# Patient Record
Sex: FEMALE | Race: WHITE | Employment: OTHER | URBAN - METROPOLITAN AREA
[De-identification: names, ages, dates, MRNs, and addresses within clinical notes are randomized per-mention and may not be internally consistent; named-entity substitution may affect disease eponyms.]

---

## 2017-10-27 PROBLEM — M19.012 GLENOHUMERAL ARTHRITIS, LEFT: Status: ACTIVE | Noted: 2017-10-27

## 2017-10-27 PROBLEM — M75.42 IMPINGEMENT SYNDROME OF LEFT SHOULDER: Status: ACTIVE | Noted: 2017-10-27

## 2018-06-20 ENCOUNTER — HOSPITAL ENCOUNTER (EMERGENCY)
Age: 63
Discharge: HOME | End: 2018-06-20
Attending: EMERGENCY MEDICINE
Payer: COMMERCIAL

## 2018-06-20 ENCOUNTER — ANESTHESIA (OUTPATIENT)
Dept: EMERGENCY DEPT | Age: 63
End: 2018-06-20
Payer: COMMERCIAL

## 2018-06-20 ENCOUNTER — ANESTHESIA EVENT (OUTPATIENT)
Dept: EMERGENCY DEPT | Age: 63
End: 2018-06-20
Payer: COMMERCIAL

## 2018-06-20 ENCOUNTER — APPOINTMENT (OUTPATIENT)
Dept: CT IMAGING | Age: 63
End: 2018-06-20
Payer: COMMERCIAL

## 2018-06-20 VITALS
OXYGEN SATURATION: 97 % | RESPIRATION RATE: 20 BRPM | SYSTOLIC BLOOD PRESSURE: 115 MMHG | DIASTOLIC BLOOD PRESSURE: 59 MMHG

## 2018-06-20 VITALS
HEIGHT: 68 IN | HEART RATE: 68 BPM | DIASTOLIC BLOOD PRESSURE: 61 MMHG | TEMPERATURE: 98.3 F | RESPIRATION RATE: 15 BRPM | SYSTOLIC BLOOD PRESSURE: 112 MMHG | WEIGHT: 124 LBS | OXYGEN SATURATION: 97 % | BODY MASS INDEX: 18.79 KG/M2

## 2018-06-20 DIAGNOSIS — G97.1 SPINAL HEADACHE: Primary | ICD-10-CM

## 2018-06-20 LAB
ALBUMIN SERPL-MCNC: 4.2 G/DL (ref 3.5–5.2)
ALP BLD-CCNC: 207 U/L (ref 35–104)
ALT SERPL-CCNC: 35 U/L (ref 0–32)
ANION GAP SERPL CALCULATED.3IONS-SCNC: 12 MMOL/L (ref 7–16)
AST SERPL-CCNC: 27 U/L (ref 0–31)
BASOPHILS ABSOLUTE: 0.04 E9/L (ref 0–0.2)
BASOPHILS RELATIVE PERCENT: 1.2 % (ref 0–2)
BILIRUB SERPL-MCNC: 0.4 MG/DL (ref 0–1.2)
BUN BLDV-MCNC: 14 MG/DL (ref 8–23)
CALCIUM SERPL-MCNC: 9.8 MG/DL (ref 8.6–10.2)
CHLORIDE BLD-SCNC: 103 MMOL/L (ref 98–107)
CO2: 25 MMOL/L (ref 22–29)
CREAT SERPL-MCNC: 1.4 MG/DL (ref 0.5–1)
EOSINOPHILS ABSOLUTE: 0.22 E9/L (ref 0.05–0.5)
EOSINOPHILS RELATIVE PERCENT: 6.8 % (ref 0–6)
GFR AFRICAN AMERICAN: 46
GFR NON-AFRICAN AMERICAN: 38 ML/MIN/1.73
GLUCOSE BLD-MCNC: 99 MG/DL (ref 74–109)
HCT VFR BLD CALC: 33.6 % (ref 34–48)
HEMOGLOBIN: 11.4 G/DL (ref 11.5–15.5)
IMMATURE GRANULOCYTES #: 0.01 E9/L
IMMATURE GRANULOCYTES %: 0.3 % (ref 0–5)
INR BLD: 1.1
LYMPHOCYTES ABSOLUTE: 0.25 E9/L (ref 1.5–4)
LYMPHOCYTES RELATIVE PERCENT: 7.8 % (ref 20–42)
MCH RBC QN AUTO: 31.1 PG (ref 26–35)
MCHC RBC AUTO-ENTMCNC: 33.9 % (ref 32–34.5)
MCV RBC AUTO: 91.8 FL (ref 80–99.9)
MONOCYTES ABSOLUTE: 0.27 E9/L (ref 0.1–0.95)
MONOCYTES RELATIVE PERCENT: 8.4 % (ref 2–12)
NEUTROPHILS ABSOLUTE: 2.43 E9/L (ref 1.8–7.3)
NEUTROPHILS RELATIVE PERCENT: 75.5 % (ref 43–80)
PDW BLD-RTO: 15.3 FL (ref 11.5–15)
PLATELET # BLD: 223 E9/L (ref 130–450)
PMV BLD AUTO: 8.9 FL (ref 7–12)
POTASSIUM SERPL-SCNC: 4.7 MMOL/L (ref 3.5–5)
PROTHROMBIN TIME: 12.4 SEC (ref 9.3–12.4)
RBC # BLD: 3.66 E12/L (ref 3.5–5.5)
SODIUM BLD-SCNC: 140 MMOL/L (ref 132–146)
TOTAL PROTEIN: 6.5 G/DL (ref 6.4–8.3)
WBC # BLD: 3.2 E9/L (ref 4.5–11.5)

## 2018-06-20 PROCEDURE — 96375 TX/PRO/DX INJ NEW DRUG ADDON: CPT

## 2018-06-20 PROCEDURE — 85610 PROTHROMBIN TIME: CPT

## 2018-06-20 PROCEDURE — 2580000003 HC RX 258: Performed by: EMERGENCY MEDICINE

## 2018-06-20 PROCEDURE — 6360000002 HC RX W HCPCS: Performed by: EMERGENCY MEDICINE

## 2018-06-20 PROCEDURE — 96374 THER/PROPH/DIAG INJ IV PUSH: CPT

## 2018-06-20 PROCEDURE — 99285 EMERGENCY DEPT VISIT HI MDM: CPT

## 2018-06-20 PROCEDURE — 70450 CT HEAD/BRAIN W/O DYE: CPT

## 2018-06-20 PROCEDURE — 80053 COMPREHEN METABOLIC PANEL: CPT

## 2018-06-20 PROCEDURE — 62273 INJECT EPIDURAL PATCH: CPT | Performed by: ANESTHESIOLOGY

## 2018-06-20 PROCEDURE — 36415 COLL VENOUS BLD VENIPUNCTURE: CPT

## 2018-06-20 PROCEDURE — 85025 COMPLETE CBC W/AUTO DIFF WBC: CPT

## 2018-06-20 RX ORDER — PROCHLORPERAZINE MALEATE 10 MG
1 TABLET ORAL EVERY 8 HOURS PRN
COMMUNITY
End: 2018-12-05 | Stop reason: ALTCHOICE

## 2018-06-20 RX ORDER — CAFFEINE CITRATE 20 MG/ML
200 SOLUTION INTRAVENOUS DAILY
Status: DISCONTINUED | OUTPATIENT
Start: 2018-06-20 | End: 2018-06-20 | Stop reason: SDUPTHER

## 2018-06-20 RX ORDER — 0.9 % SODIUM CHLORIDE 0.9 %
1000 INTRAVENOUS SOLUTION INTRAVENOUS ONCE
Status: COMPLETED | OUTPATIENT
Start: 2018-06-20 | End: 2018-06-20

## 2018-06-20 RX ORDER — ACYCLOVIR 200 MG/1
400 CAPSULE ORAL 2 TIMES DAILY
Status: ON HOLD | COMMUNITY
Start: 2018-06-03 | End: 2022-01-06 | Stop reason: HOSPADM

## 2018-06-20 RX ORDER — ALLOPURINOL 100 MG/1
1 TABLET ORAL DAILY
COMMUNITY
Start: 2018-06-16 | End: 2018-12-05

## 2018-06-20 RX ORDER — DIPHENHYDRAMINE HYDROCHLORIDE 50 MG/ML
50 INJECTION INTRAMUSCULAR; INTRAVENOUS ONCE
Status: COMPLETED | OUTPATIENT
Start: 2018-06-20 | End: 2018-06-20

## 2018-06-20 RX ORDER — ONDANSETRON 2 MG/ML
4 INJECTION INTRAMUSCULAR; INTRAVENOUS ONCE
Status: COMPLETED | OUTPATIENT
Start: 2018-06-20 | End: 2018-06-20

## 2018-06-20 RX ORDER — DESONIDE 0.5 MG/G
CREAM TOPICAL 2 TIMES DAILY PRN
COMMUNITY
Start: 2018-03-21 | End: 2018-12-05 | Stop reason: ALTCHOICE

## 2018-06-20 RX ORDER — ONDANSETRON 4 MG/1
4 TABLET, ORALLY DISINTEGRATING ORAL EVERY 8 HOURS PRN
Qty: 10 TABLET | Refills: 0 | Status: SHIPPED | OUTPATIENT
Start: 2018-06-20 | End: 2018-12-05

## 2018-06-20 RX ORDER — TACROLIMUS 1 MG/1
0.5 CAPSULE ORAL 2 TIMES DAILY
COMMUNITY
Start: 2017-12-26 | End: 2020-05-19

## 2018-06-20 RX ORDER — MAGNESIUM CHLORIDE 71.5 G/G
1 TABLET ORAL 2 TIMES DAILY
COMMUNITY
Start: 2018-06-15 | End: 2018-12-05

## 2018-06-20 RX ORDER — GABAPENTIN 100 MG/1
300 CAPSULE ORAL SEE ADMIN INSTRUCTIONS
COMMUNITY
Start: 2018-06-07

## 2018-06-20 RX ORDER — SULFAMETHOXAZOLE AND TRIMETHOPRIM 400; 80 MG/1; MG/1
1 TABLET ORAL
COMMUNITY
Start: 2018-06-03 | End: 2018-12-05 | Stop reason: ALTCHOICE

## 2018-06-20 RX ADMIN — SODIUM CHLORIDE 1000 ML: 9 INJECTION, SOLUTION INTRAVENOUS at 09:07

## 2018-06-20 RX ADMIN — ONDANSETRON 4 MG: 2 INJECTION INTRAMUSCULAR; INTRAVENOUS at 09:06

## 2018-06-20 RX ADMIN — SODIUM CHLORIDE 1000 ML: 9 INJECTION, SOLUTION INTRAVENOUS at 10:35

## 2018-06-20 RX ADMIN — DIPHENHYDRAMINE HYDROCHLORIDE 50 MG: 50 INJECTION, SOLUTION INTRAMUSCULAR; INTRAVENOUS at 10:25

## 2018-06-20 ASSESSMENT — ENCOUNTER SYMPTOMS
SORE THROAT: 0
SHORTNESS OF BREATH: 0
ABDOMINAL DISTENTION: 0
VOMITING: 1
WHEEZING: 0
DIARRHEA: 0
NAUSEA: 1
BACK PAIN: 0
COUGH: 0
EYE REDNESS: 0
SINUS PRESSURE: 0
EYE DISCHARGE: 0
EYE PAIN: 0

## 2018-06-20 ASSESSMENT — PAIN DESCRIPTION - LOCATION: LOCATION: HEAD

## 2018-06-20 ASSESSMENT — PAIN DESCRIPTION - DESCRIPTORS: DESCRIPTORS: ACHING

## 2018-06-20 ASSESSMENT — PAIN DESCRIPTION - PAIN TYPE: TYPE: ACUTE PAIN

## 2018-06-20 ASSESSMENT — PAIN SCALES - GENERAL: PAINLEVEL_OUTOF10: 4

## 2018-12-05 ENCOUNTER — HOSPITAL ENCOUNTER (INPATIENT)
Age: 63
LOS: 2 days | Discharge: HOME | DRG: 417 | End: 2018-12-07
Attending: EMERGENCY MEDICINE | Admitting: FAMILY MEDICINE
Payer: COMMERCIAL

## 2018-12-05 ENCOUNTER — APPOINTMENT (OUTPATIENT)
Dept: GENERAL RADIOLOGY | Age: 63
DRG: 417 | End: 2018-12-05
Payer: COMMERCIAL

## 2018-12-05 ENCOUNTER — APPOINTMENT (OUTPATIENT)
Dept: ULTRASOUND IMAGING | Age: 63
DRG: 417 | End: 2018-12-05
Payer: COMMERCIAL

## 2018-12-05 ENCOUNTER — APPOINTMENT (OUTPATIENT)
Dept: CT IMAGING | Age: 63
DRG: 417 | End: 2018-12-05
Payer: COMMERCIAL

## 2018-12-05 DIAGNOSIS — K85.90 ACUTE PANCREATITIS, UNSPECIFIED COMPLICATION STATUS, UNSPECIFIED PANCREATITIS TYPE: Primary | ICD-10-CM

## 2018-12-05 DIAGNOSIS — K85.10 ACUTE BILIARY PANCREATITIS WITHOUT INFECTION OR NECROSIS: ICD-10-CM

## 2018-12-05 LAB
ALBUMIN SERPL-MCNC: 4 G/DL (ref 3.5–5.2)
ALP BLD-CCNC: 133 U/L (ref 35–104)
ALT SERPL-CCNC: 30 U/L (ref 0–32)
ANION GAP SERPL CALCULATED.3IONS-SCNC: 10 MMOL/L (ref 7–16)
AST SERPL-CCNC: 36 U/L (ref 0–31)
BASOPHILS ABSOLUTE: 0.04 E9/L (ref 0–0.2)
BASOPHILS RELATIVE PERCENT: 0.6 % (ref 0–2)
BILIRUB SERPL-MCNC: 0.5 MG/DL (ref 0–1.2)
BUN BLDV-MCNC: 23 MG/DL (ref 8–23)
CALCIUM SERPL-MCNC: 10 MG/DL (ref 8.6–10.2)
CHLORIDE BLD-SCNC: 106 MMOL/L (ref 98–107)
CO2: 28 MMOL/L (ref 22–29)
CREAT SERPL-MCNC: 1.7 MG/DL (ref 0.5–1)
EKG ATRIAL RATE: 71 BPM
EKG P AXIS: 81 DEGREES
EKG P-R INTERVAL: 170 MS
EKG Q-T INTERVAL: 456 MS
EKG QRS DURATION: 88 MS
EKG QTC CALCULATION (BAZETT): 495 MS
EKG R AXIS: -29 DEGREES
EKG T AXIS: 26 DEGREES
EKG VENTRICULAR RATE: 71 BPM
EOSINOPHILS ABSOLUTE: 0.52 E9/L (ref 0.05–0.5)
EOSINOPHILS RELATIVE PERCENT: 8 % (ref 0–6)
GFR AFRICAN AMERICAN: 37
GFR NON-AFRICAN AMERICAN: 30 ML/MIN/1.73
GLUCOSE BLD-MCNC: 94 MG/DL (ref 74–99)
HCT VFR BLD CALC: 32.2 % (ref 34–48)
HEMOGLOBIN: 10.6 G/DL (ref 11.5–15.5)
IMMATURE GRANULOCYTES #: 0.02 E9/L
IMMATURE GRANULOCYTES %: 0.3 % (ref 0–5)
LIPASE: 2278 U/L (ref 13–60)
LYMPHOCYTES ABSOLUTE: 1.01 E9/L (ref 1.5–4)
LYMPHOCYTES RELATIVE PERCENT: 15.6 % (ref 20–42)
MCH RBC QN AUTO: 31.7 PG (ref 26–35)
MCHC RBC AUTO-ENTMCNC: 32.9 % (ref 32–34.5)
MCV RBC AUTO: 96.4 FL (ref 80–99.9)
MONOCYTES ABSOLUTE: 0.62 E9/L (ref 0.1–0.95)
MONOCYTES RELATIVE PERCENT: 9.6 % (ref 2–12)
NEUTROPHILS ABSOLUTE: 4.27 E9/L (ref 1.8–7.3)
NEUTROPHILS RELATIVE PERCENT: 65.9 % (ref 43–80)
PDW BLD-RTO: 12.4 FL (ref 11.5–15)
PLATELET # BLD: 217 E9/L (ref 130–450)
PMV BLD AUTO: 8.3 FL (ref 7–12)
POTASSIUM SERPL-SCNC: 4.4 MMOL/L (ref 3.5–5)
PRO-BNP: 2176 PG/ML (ref 0–125)
RBC # BLD: 3.34 E12/L (ref 3.5–5.5)
SODIUM BLD-SCNC: 144 MMOL/L (ref 132–146)
TOTAL PROTEIN: 6.6 G/DL (ref 6.4–8.3)
TROPONIN: <0.01 NG/ML (ref 0–0.03)
WBC # BLD: 6.5 E9/L (ref 4.5–11.5)

## 2018-12-05 PROCEDURE — 84484 ASSAY OF TROPONIN QUANT: CPT

## 2018-12-05 PROCEDURE — 76705 ECHO EXAM OF ABDOMEN: CPT

## 2018-12-05 PROCEDURE — 6360000002 HC RX W HCPCS: Performed by: FAMILY MEDICINE

## 2018-12-05 PROCEDURE — 2580000003 HC RX 258: Performed by: STUDENT IN AN ORGANIZED HEALTH CARE EDUCATION/TRAINING PROGRAM

## 2018-12-05 PROCEDURE — 80053 COMPREHEN METABOLIC PANEL: CPT

## 2018-12-05 PROCEDURE — 87040 BLOOD CULTURE FOR BACTERIA: CPT

## 2018-12-05 PROCEDURE — 85025 COMPLETE CBC W/AUTO DIFF WBC: CPT

## 2018-12-05 PROCEDURE — 93005 ELECTROCARDIOGRAM TRACING: CPT | Performed by: PHYSICIAN ASSISTANT

## 2018-12-05 PROCEDURE — 71045 X-RAY EXAM CHEST 1 VIEW: CPT

## 2018-12-05 PROCEDURE — 83690 ASSAY OF LIPASE: CPT

## 2018-12-05 PROCEDURE — 36415 COLL VENOUS BLD VENIPUNCTURE: CPT

## 2018-12-05 PROCEDURE — 80197 ASSAY OF TACROLIMUS: CPT

## 2018-12-05 PROCEDURE — 83880 ASSAY OF NATRIURETIC PEPTIDE: CPT

## 2018-12-05 PROCEDURE — 6360000002 HC RX W HCPCS: Performed by: STUDENT IN AN ORGANIZED HEALTH CARE EDUCATION/TRAINING PROGRAM

## 2018-12-05 PROCEDURE — 74176 CT ABD & PELVIS W/O CONTRAST: CPT

## 2018-12-05 PROCEDURE — 2060000000 HC ICU INTERMEDIATE R&B

## 2018-12-05 PROCEDURE — 99285 EMERGENCY DEPT VISIT HI MDM: CPT

## 2018-12-05 PROCEDURE — 2500000003 HC RX 250 WO HCPCS: Performed by: STUDENT IN AN ORGANIZED HEALTH CARE EDUCATION/TRAINING PROGRAM

## 2018-12-05 RX ORDER — 0.9 % SODIUM CHLORIDE 0.9 %
1000 INTRAVENOUS SOLUTION INTRAVENOUS ONCE
Status: COMPLETED | OUTPATIENT
Start: 2018-12-05 | End: 2018-12-05

## 2018-12-05 RX ORDER — ACETAMINOPHEN 325 MG/1
650 TABLET ORAL EVERY 4 HOURS PRN
Status: DISCONTINUED | OUTPATIENT
Start: 2018-12-05 | End: 2018-12-07 | Stop reason: HOSPADM

## 2018-12-05 RX ORDER — SODIUM CHLORIDE 0.9 % (FLUSH) 0.9 %
10 SYRINGE (ML) INJECTION EVERY 12 HOURS SCHEDULED
Status: DISCONTINUED | OUTPATIENT
Start: 2018-12-05 | End: 2018-12-07 | Stop reason: HOSPADM

## 2018-12-05 RX ORDER — HEPARIN SODIUM 10000 [USP'U]/ML
5000 INJECTION, SOLUTION INTRAVENOUS; SUBCUTANEOUS EVERY 12 HOURS
Status: DISCONTINUED | OUTPATIENT
Start: 2018-12-05 | End: 2018-12-07 | Stop reason: HOSPADM

## 2018-12-05 RX ORDER — SODIUM CHLORIDE 0.9 % (FLUSH) 0.9 %
10 SYRINGE (ML) INJECTION PRN
Status: DISCONTINUED | OUTPATIENT
Start: 2018-12-05 | End: 2018-12-07 | Stop reason: HOSPADM

## 2018-12-05 RX ADMIN — SODIUM CHLORIDE 1000 ML: 9 INJECTION, SOLUTION INTRAVENOUS at 14:00

## 2018-12-05 RX ADMIN — CEFEPIME 2 G: 2 INJECTION, POWDER, FOR SOLUTION INTRAVENOUS at 22:04

## 2018-12-05 RX ADMIN — METRONIDAZOLE 500 MG: 500 INJECTION, SOLUTION INTRAVENOUS at 23:17

## 2018-12-05 RX ADMIN — SODIUM CHLORIDE 1000 ML: 9 INJECTION, SOLUTION INTRAVENOUS at 19:03

## 2018-12-05 RX ADMIN — HEPARIN SODIUM 5000 UNITS: 10000 INJECTION INTRAVENOUS; SUBCUTANEOUS at 23:20

## 2018-12-05 ASSESSMENT — ENCOUNTER SYMPTOMS
CONSTIPATION: 0
DIARRHEA: 0
COUGH: 0
WHEEZING: 0
SHORTNESS OF BREATH: 0
NAUSEA: 0
COLOR CHANGE: 0
VOMITING: 0
ABDOMINAL PAIN: 1

## 2018-12-05 ASSESSMENT — PAIN SCALES - GENERAL
PAINLEVEL_OUTOF10: 0
PAINLEVEL_OUTOF10: 7

## 2018-12-05 ASSESSMENT — PAIN DESCRIPTION - PAIN TYPE
TYPE: ACUTE PAIN
TYPE: ACUTE PAIN

## 2018-12-05 ASSESSMENT — PAIN DESCRIPTION - ORIENTATION: ORIENTATION: MID

## 2018-12-05 ASSESSMENT — PAIN DESCRIPTION - DESCRIPTORS: DESCRIPTORS: HEAVINESS

## 2018-12-05 ASSESSMENT — PAIN DESCRIPTION - LOCATION: LOCATION: CHEST

## 2018-12-05 ASSESSMENT — PAIN DESCRIPTION - FREQUENCY: FREQUENCY: CONTINUOUS

## 2018-12-05 NOTE — ED PROVIDER NOTES
of the epigastric region. ). There is no rebound and no guarding. No hernia. Musculoskeletal: Normal range of motion. She exhibits no edema, tenderness or deformity. Lymphadenopathy:     She has no cervical adenopathy. Neurological: She is alert and oriented to person, place, and time. No cranial nerve deficit. Skin: Skin is warm and dry. Capillary refill takes less than 2 seconds. No rash noted. She is not diaphoretic. No erythema. No pallor. Psychiatric: She has a normal mood and affect. Her behavior is normal. Judgment and thought content normal.   Nursing note and vitals reviewed. Procedures  --------------------------------------------- PAST HISTORY ---------------------------------------------  Past Medical History:  has a past medical history of Acute leukemia ([facility] [address] Breast cancer ([facility] [place] [facility] Cancer ([facility] [place] [facility] History of blood transfusion; and Hypertension. Past Surgical History:  has a past surgical history that includes Tubal ligation; Breast lumpectomy; Ankle surgery (Left); other surgical history (Left, 11/29/2016); Breast lumpectomy (Right); and Dilation and curettage of uterus. Social History:  reports that she has never smoked. She has never used smokeless tobacco. She reports that she drinks alcohol. She reports that she does not use drugs. Family History: family history is not on file. The patients home medications have been reviewed.     Allergies: Penicillins and Vancomycin    -------------------------------------------------- RESULTS -------------------------------------------------    LABS:  Results for orders placed or performed during the hospital encounter of 12/05/18   CBC Auto Differential   Result Value Ref Range    WBC 6.5 4.5 - 11.5 E9/L    RBC 3.34 (L) 3.50 - 5.50 E12/L    Hemoglobin 10.6 (L) 11.5 - 15.5 g/dL    Hematocrit 32.2 (L) 34.0 - 48.0 %    MCV 96.4 80.0 - 99.9 fL    MCH 31.7 26.0 - 35.0 pg    MCHC 32.9 32.0 - 34.5 %    RDW 12.4 11.5 - 15.0 fL    Platelets 694

## 2018-12-06 ENCOUNTER — ANESTHESIA EVENT (OUTPATIENT)
Dept: OPERATING ROOM | Age: 63
DRG: 417 | End: 2018-12-06
Payer: COMMERCIAL

## 2018-12-06 ENCOUNTER — APPOINTMENT (OUTPATIENT)
Dept: GENERAL RADIOLOGY | Age: 63
DRG: 417 | End: 2018-12-06
Payer: COMMERCIAL

## 2018-12-06 ENCOUNTER — ANESTHESIA (OUTPATIENT)
Dept: OPERATING ROOM | Age: 63
DRG: 417 | End: 2018-12-06
Payer: COMMERCIAL

## 2018-12-06 VITALS
SYSTOLIC BLOOD PRESSURE: 141 MMHG | OXYGEN SATURATION: 100 % | RESPIRATION RATE: 1 BRPM | DIASTOLIC BLOOD PRESSURE: 74 MMHG | TEMPERATURE: 96.4 F

## 2018-12-06 PROCEDURE — 2500000003 HC RX 250 WO HCPCS

## 2018-12-06 PROCEDURE — 2500000003 HC RX 250 WO HCPCS: Performed by: SURGERY

## 2018-12-06 PROCEDURE — 6360000002 HC RX W HCPCS: Performed by: FAMILY MEDICINE

## 2018-12-06 PROCEDURE — 6360000002 HC RX W HCPCS

## 2018-12-06 PROCEDURE — 3209999900 FLUORO FOR SURGICAL PROCEDURES

## 2018-12-06 PROCEDURE — 88312 SPECIAL STAINS GROUP 1: CPT

## 2018-12-06 PROCEDURE — 0FT44ZZ RESECTION OF GALLBLADDER, PERCUTANEOUS ENDOSCOPIC APPROACH: ICD-10-PCS | Performed by: SURGERY

## 2018-12-06 PROCEDURE — 2580000003 HC RX 258: Performed by: STUDENT IN AN ORGANIZED HEALTH CARE EDUCATION/TRAINING PROGRAM

## 2018-12-06 PROCEDURE — 7100000000 HC PACU RECOVERY - FIRST 15 MIN: Performed by: SURGERY

## 2018-12-06 PROCEDURE — 2060000000 HC ICU INTERMEDIATE R&B

## 2018-12-06 PROCEDURE — 3600000014 HC SURGERY LEVEL 4 ADDTL 15MIN: Performed by: SURGERY

## 2018-12-06 PROCEDURE — 2580000003 HC RX 258

## 2018-12-06 PROCEDURE — 88304 TISSUE EXAM BY PATHOLOGIST: CPT

## 2018-12-06 PROCEDURE — 2580000003 HC RX 258: Performed by: FAMILY MEDICINE

## 2018-12-06 PROCEDURE — 6360000002 HC RX W HCPCS: Performed by: STUDENT IN AN ORGANIZED HEALTH CARE EDUCATION/TRAINING PROGRAM

## 2018-12-06 PROCEDURE — 6360000002 HC RX W HCPCS: Performed by: ANESTHESIOLOGY

## 2018-12-06 PROCEDURE — 2500000003 HC RX 250 WO HCPCS: Performed by: STUDENT IN AN ORGANIZED HEALTH CARE EDUCATION/TRAINING PROGRAM

## 2018-12-06 PROCEDURE — BF131ZZ FLUOROSCOPY OF GALLBLADDER AND BILE DUCTS USING LOW OSMOLAR CONTRAST: ICD-10-PCS | Performed by: SURGERY

## 2018-12-06 PROCEDURE — 3600000004 HC SURGERY LEVEL 4 BASE: Performed by: SURGERY

## 2018-12-06 PROCEDURE — 7100000001 HC PACU RECOVERY - ADDTL 15 MIN: Performed by: SURGERY

## 2018-12-06 PROCEDURE — 2709999900 HC NON-CHARGEABLE SUPPLY: Performed by: SURGERY

## 2018-12-06 PROCEDURE — 3700000000 HC ANESTHESIA ATTENDED CARE: Performed by: SURGERY

## 2018-12-06 PROCEDURE — 3700000001 HC ADD 15 MINUTES (ANESTHESIA): Performed by: SURGERY

## 2018-12-06 RX ORDER — MORPHINE SULFATE 2 MG/ML
1 INJECTION, SOLUTION INTRAMUSCULAR; INTRAVENOUS EVERY 5 MIN PRN
Status: DISCONTINUED | OUTPATIENT
Start: 2018-12-06 | End: 2018-12-06 | Stop reason: HOSPADM

## 2018-12-06 RX ORDER — DEXAMETHASONE SODIUM PHOSPHATE 10 MG/ML
INJECTION, SOLUTION INTRAMUSCULAR; INTRAVENOUS PRN
Status: DISCONTINUED | OUTPATIENT
Start: 2018-12-06 | End: 2018-12-06 | Stop reason: SDUPTHER

## 2018-12-06 RX ORDER — ROCURONIUM BROMIDE 10 MG/ML
INJECTION, SOLUTION INTRAVENOUS PRN
Status: DISCONTINUED | OUTPATIENT
Start: 2018-12-06 | End: 2018-12-06 | Stop reason: SDUPTHER

## 2018-12-06 RX ORDER — PROMETHAZINE HYDROCHLORIDE 25 MG/ML
6.25 INJECTION, SOLUTION INTRAMUSCULAR; INTRAVENOUS EVERY 10 MIN PRN
Status: DISCONTINUED | OUTPATIENT
Start: 2018-12-06 | End: 2018-12-06 | Stop reason: HOSPADM

## 2018-12-06 RX ORDER — PROPOFOL 10 MG/ML
INJECTION, EMULSION INTRAVENOUS PRN
Status: DISCONTINUED | OUTPATIENT
Start: 2018-12-06 | End: 2018-12-06 | Stop reason: SDUPTHER

## 2018-12-06 RX ORDER — SODIUM CHLORIDE, SODIUM LACTATE, POTASSIUM CHLORIDE, CALCIUM CHLORIDE 600; 310; 30; 20 MG/100ML; MG/100ML; MG/100ML; MG/100ML
INJECTION, SOLUTION INTRAVENOUS CONTINUOUS PRN
Status: DISCONTINUED | OUTPATIENT
Start: 2018-12-06 | End: 2018-12-06 | Stop reason: SDUPTHER

## 2018-12-06 RX ORDER — MEPERIDINE HYDROCHLORIDE 50 MG/ML
12.5 INJECTION INTRAMUSCULAR; INTRAVENOUS; SUBCUTANEOUS EVERY 5 MIN PRN
Status: DISCONTINUED | OUTPATIENT
Start: 2018-12-06 | End: 2018-12-06 | Stop reason: HOSPADM

## 2018-12-06 RX ORDER — HYDROCODONE BITARTRATE AND ACETAMINOPHEN 5; 325 MG/1; MG/1
2 TABLET ORAL PRN
Status: DISCONTINUED | OUTPATIENT
Start: 2018-12-06 | End: 2018-12-06 | Stop reason: HOSPADM

## 2018-12-06 RX ORDER — HYDROCODONE BITARTRATE AND ACETAMINOPHEN 5; 325 MG/1; MG/1
1 TABLET ORAL PRN
Status: DISCONTINUED | OUTPATIENT
Start: 2018-12-06 | End: 2018-12-06 | Stop reason: HOSPADM

## 2018-12-06 RX ORDER — MORPHINE SULFATE 2 MG/ML
2 INJECTION, SOLUTION INTRAMUSCULAR; INTRAVENOUS EVERY 5 MIN PRN
Status: DISCONTINUED | OUTPATIENT
Start: 2018-12-06 | End: 2018-12-06 | Stop reason: HOSPADM

## 2018-12-06 RX ORDER — LIDOCAINE HYDROCHLORIDE 20 MG/ML
INJECTION, SOLUTION INFILTRATION; PERINEURAL PRN
Status: DISCONTINUED | OUTPATIENT
Start: 2018-12-06 | End: 2018-12-06 | Stop reason: SDUPTHER

## 2018-12-06 RX ORDER — SODIUM CHLORIDE 9 MG/ML
INJECTION, SOLUTION INTRAVENOUS CONTINUOUS PRN
Status: DISCONTINUED | OUTPATIENT
Start: 2018-12-06 | End: 2018-12-06 | Stop reason: SDUPTHER

## 2018-12-06 RX ORDER — LIDOCAINE HYDROCHLORIDE AND EPINEPHRINE BITARTRATE 20; .01 MG/ML; MG/ML
INJECTION, SOLUTION SUBCUTANEOUS PRN
Status: DISCONTINUED | OUTPATIENT
Start: 2018-12-06 | End: 2018-12-06 | Stop reason: HOSPADM

## 2018-12-06 RX ORDER — SODIUM CHLORIDE 9 MG/ML
INJECTION, SOLUTION INTRAVENOUS CONTINUOUS
Status: DISCONTINUED | OUTPATIENT
Start: 2018-12-06 | End: 2018-12-07

## 2018-12-06 RX ORDER — MIDAZOLAM HYDROCHLORIDE 1 MG/ML
INJECTION INTRAMUSCULAR; INTRAVENOUS PRN
Status: DISCONTINUED | OUTPATIENT
Start: 2018-12-06 | End: 2018-12-06 | Stop reason: SDUPTHER

## 2018-12-06 RX ORDER — FENTANYL CITRATE 50 UG/ML
INJECTION, SOLUTION INTRAMUSCULAR; INTRAVENOUS PRN
Status: DISCONTINUED | OUTPATIENT
Start: 2018-12-06 | End: 2018-12-06 | Stop reason: SDUPTHER

## 2018-12-06 RX ADMIN — Medication 10 ML: at 20:24

## 2018-12-06 RX ADMIN — ROCURONIUM BROMIDE 40 MG: 10 INJECTION INTRAVENOUS at 14:01

## 2018-12-06 RX ADMIN — LIDOCAINE HYDROCHLORIDE 60 MG: 20 INJECTION, SOLUTION INFILTRATION; PERINEURAL at 14:01

## 2018-12-06 RX ADMIN — METRONIDAZOLE 500 MG: 500 INJECTION, SOLUTION INTRAVENOUS at 05:13

## 2018-12-06 RX ADMIN — CEFEPIME 2 G: 2 INJECTION, POWDER, FOR SOLUTION INTRAVENOUS at 10:52

## 2018-12-06 RX ADMIN — METRONIDAZOLE 500 MG: 500 INJECTION, SOLUTION INTRAVENOUS at 13:46

## 2018-12-06 RX ADMIN — PROPOFOL 120 MG: 10 INJECTION, EMULSION INTRAVENOUS at 14:01

## 2018-12-06 RX ADMIN — METRONIDAZOLE 500 MG: 500 INJECTION, SOLUTION INTRAVENOUS at 20:24

## 2018-12-06 RX ADMIN — METRONIDAZOLE 500 MG: 500 INJECTION, SOLUTION INTRAVENOUS at 14:15

## 2018-12-06 RX ADMIN — SODIUM CHLORIDE: 9 INJECTION, SOLUTION INTRAVENOUS at 06:33

## 2018-12-06 RX ADMIN — CEFEPIME 2 G: 2 INJECTION, POWDER, FOR SOLUTION INTRAVENOUS at 20:24

## 2018-12-06 RX ADMIN — FENTANYL CITRATE 100 MCG: 50 INJECTION, SOLUTION INTRAMUSCULAR; INTRAVENOUS at 14:01

## 2018-12-06 RX ADMIN — MIDAZOLAM HYDROCHLORIDE 2 MG: 1 INJECTION, SOLUTION INTRAMUSCULAR; INTRAVENOUS at 13:57

## 2018-12-06 RX ADMIN — HEPARIN SODIUM 5000 UNITS: 10000 INJECTION INTRAVENOUS; SUBCUTANEOUS at 20:24

## 2018-12-06 RX ADMIN — HYDROMORPHONE HYDROCHLORIDE 0.5 MG: 1 INJECTION, SOLUTION INTRAMUSCULAR; INTRAVENOUS; SUBCUTANEOUS at 15:35

## 2018-12-06 RX ADMIN — HYDROMORPHONE HYDROCHLORIDE 0.5 MG: 1 INJECTION, SOLUTION INTRAMUSCULAR; INTRAVENOUS; SUBCUTANEOUS at 15:40

## 2018-12-06 RX ADMIN — DEXAMETHASONE SODIUM PHOSPHATE 10 MG: 10 INJECTION INTRAMUSCULAR; INTRAVENOUS at 14:13

## 2018-12-06 RX ADMIN — SODIUM CHLORIDE, POTASSIUM CHLORIDE, SODIUM LACTATE AND CALCIUM CHLORIDE: 600; 310; 30; 20 INJECTION, SOLUTION INTRAVENOUS at 14:25

## 2018-12-06 RX ADMIN — SODIUM CHLORIDE: 9 INJECTION, SOLUTION INTRAVENOUS at 13:57

## 2018-12-06 ASSESSMENT — PULMONARY FUNCTION TESTS
PIF_VALUE: 19
PIF_VALUE: 1
PIF_VALUE: 16
PIF_VALUE: 1
PIF_VALUE: 13
PIF_VALUE: 16
PIF_VALUE: 19
PIF_VALUE: 15
PIF_VALUE: 22
PIF_VALUE: 21
PIF_VALUE: 2
PIF_VALUE: 22
PIF_VALUE: 18
PIF_VALUE: 21
PIF_VALUE: 1
PIF_VALUE: 11
PIF_VALUE: 19
PIF_VALUE: 18
PIF_VALUE: 18
PIF_VALUE: 2
PIF_VALUE: 18
PIF_VALUE: 19
PIF_VALUE: 10
PIF_VALUE: 23
PIF_VALUE: 15
PIF_VALUE: 1
PIF_VALUE: 14
PIF_VALUE: 21
PIF_VALUE: 18
PIF_VALUE: 18
PIF_VALUE: 20
PIF_VALUE: 13
PIF_VALUE: 10
PIF_VALUE: 16
PIF_VALUE: 16
PIF_VALUE: 19
PIF_VALUE: 17
PIF_VALUE: 18
PIF_VALUE: 22
PIF_VALUE: 16
PIF_VALUE: 20
PIF_VALUE: 22
PIF_VALUE: 18
PIF_VALUE: 18
PIF_VALUE: 22
PIF_VALUE: 16
PIF_VALUE: 18
PIF_VALUE: 21
PIF_VALUE: 18
PIF_VALUE: 21
PIF_VALUE: 16
PIF_VALUE: 21
PIF_VALUE: 17
PIF_VALUE: 15
PIF_VALUE: 1
PIF_VALUE: 19
PIF_VALUE: 19
PIF_VALUE: 12
PIF_VALUE: 18
PIF_VALUE: 13

## 2018-12-06 ASSESSMENT — PAIN SCALES - GENERAL
PAINLEVEL_OUTOF10: 0
PAINLEVEL_OUTOF10: 8
PAINLEVEL_OUTOF10: 3
PAINLEVEL_OUTOF10: 8
PAINLEVEL_OUTOF10: 8

## 2018-12-06 ASSESSMENT — PAIN DESCRIPTION - DESCRIPTORS
DESCRIPTORS: ACHING;SORE
DESCRIPTORS: ACHING;SORE;DISCOMFORT

## 2018-12-06 ASSESSMENT — PAIN DESCRIPTION - FREQUENCY: FREQUENCY: CONTINUOUS

## 2018-12-06 ASSESSMENT — PAIN DESCRIPTION - PAIN TYPE
TYPE: SURGICAL PAIN
TYPE: SURGICAL PAIN

## 2018-12-06 ASSESSMENT — PAIN DESCRIPTION - ORIENTATION
ORIENTATION: MID
ORIENTATION: MID

## 2018-12-06 ASSESSMENT — PAIN DESCRIPTION - LOCATION
LOCATION: ABDOMEN
LOCATION: ABDOMEN

## 2018-12-06 NOTE — H&P
[address]                  [address] [facility],  [facility]                              HISTORY AND PHYSICAL    PATIENT NAME: Yoly Logan                      :        1955  MED REC NO:   [id number]                            ROOM:       7402  ACCOUNT NO:   [de-identified]                           ADMIT DATE: 2018  PROVIDER:     Mark Michele DO      CHIEF COMPLAINT AND REASON FOR THIS HOSPITAL ADMISSION:  Possible acute  cholecystitis with acute pancreatitis. HISTORY OF PRESENT ILLNESS:  The patient is a 81-year-old female, who  presents to this hospital emergency room for evaluation of chest pain,  primarily epigastric which started on the morning of this hospital  admission. The pain did radiate into her right upper quadrant. She  denies any nausea, vomiting, diarrhea, constipation, melanoma or  hematochezia. She has never had an EGD and her last colonoscopy was  approximately 10 years ago. She is currently being treated at  [facility] for AML and has received a bone marrow transplant  approximately 1 year ago and is on immunosuppressive, Prograf. She was  afebrile. When she presented, her white count was 10.6. Serum  electrolytes were within normal limits. Creatinine was 1.7. Lipase was  2278. Alkaline phos was mildly elevated at 133, AST was 36, ALT was 30. She had a CT scan performed of the abdomen and pelvis which revealed the  presence of sludge in the gallbladder, possibly representing acute  cholecystitis and she also did have some inflammatory changes noted in  the area of the pancreas. The patient was admitted with cholecystitis  and pancreatitis. She was kept n.p.o.  IV fluids were instituted. Surgical consultation was requested. MEDICATIONS:  Her recent and present medications are noted.     PAST SURGICAL HISTORY:  Her past surgical history is consistent with  prior ankle surgery, breast lumpectomy, D

## 2018-12-06 NOTE — ED NOTES
Telemetry data transferred to [facility], confirmed with Suellen Ambrocio in [address]  12/05/18 1928

## 2018-12-06 NOTE — CONSULTS
GENERAL SURGERY  CONSULT NOTE  12/5/2018    Physician Consulted: Dr. Marialuisa Stewart   Reason for Consult: Possible acute cholecystitis with acute pancreatitis   Referring Physician: Dr. Nora Suarez    [facility] is a 59 y.o. female who presents for evaluation of chest pain that started this morning. Patient states she was awoken when some midsternal chest pain that radiated down into her epigastrium and RUQ. She has never had this pain before. She denies nausea, vomiting, diarrhea, constipation, melena, hematochezia. She has never had an EGD, her last colonoscopy was 10-11 years ago and normal per her. She is not on [address]. She does have a history of AML and received a bone marrow transplant 1 year ago tomorrow and she is on immunosuppressive tacrolimus. Past Medical History:   Diagnosis Date    Acute leukemia ([facility] [address]     Breast cancer ([facility] [address]     right    Cancer [facility]) 2004    right breast    History of blood transfusion     Hypertension        Past Surgical History:   Procedure Laterality Date    ANKLE SURGERY Left     ORIF    BREAST LUMPECTOMY      right side 2004. also had chemo & radiation    BREAST LUMPECTOMY Right     DILATION AND CURETTAGE OF UTERUS      OTHER SURGICAL HISTORY Left 11/29/2016    left hand middle finger ganglion removal    TUBAL LIGATION         Medications Prior to Admission:    Prior to Admission medications    Medication Sig Start Date End Date Taking? Authorizing Provider   acyclovir (ZOVIRAX) 200 MG capsule Take 1 capsule by mouth 2 times daily 6/3/18  Yes Historical Provider, MD   tacrolimus (PROGRAF) 1 MG capsule Take 0.5 mg by mouth 2 times daily  12/26/17 12/5/18 Yes Historical Provider, MD   Cholecalciferol 2000 units CAPS Take 1 capsule by mouth daily 2/1/18  Yes Historical Provider, MD   gabapentin (NEURONTIN) 100 MG capsule Take 300 mg by mouth 2 times daily.  . 6/7/18  Yes Historical Provider, MD   pantoprazole (PROTONIX) 40 MG tablet Take 40 mg by mouth Daily with supper    Yes Historical Provider, MD   therapeutic multivitamin-minerals (THERAGRAN-M) tablet Take 1 tablet by mouth daily. Yes Historical Provider, MD       Allergies   Allergen Reactions    Penicillins Shortness Of Breath     Pt said she \"passed out\"    Vancomycin Itching     Pt said she gets a \"redness and swelling\" as well       History reviewed. No pertinent family history. Social History   Substance Use Topics    Smoking status: Never Smoker    Smokeless tobacco: Never Used    Alcohol use Yes      Comment: occas         Review of Systems   General ROS: negative  Hematological and Lymphatic ROS: negative  Respiratory ROS: no cough, shortness of breath, or wheezing  Cardiovascular ROS: positive for - chest pain  negative for - irregular heartbeat, palpitations or shortness of breath  Gastrointestinal ROS: as above  Genito-Urinary ROS: no dysuria, trouble voiding, or hematuria  Musculoskeletal ROS: negative      PHYSICAL EXAM:    Vitals:    12/05/18 1904   BP: (!) 152/76   Pulse: 76   Resp: 18   Temp: 98.1 °F (36.7 °C)   SpO2: 100%       General Appearance:  awake, alert, oriented, in no acute distress  Skin:  Skin color, texture, turgor normal. No rashes or lesions. Head/face:  NCAT  Eyes:  No gross abnormalities. Lungs:  No increased work of breathing   Heart:  RR  Abdomen:  Soft, TTP RUQ w/ active guarding, non-distended  Extremities: Extremities warm to touch, pink, with no edema. LABS:  CBC  Recent Labs      12/05/18   0050   WBC  6.5   HGB  10.6*   HCT  32.2*   PLT  217     BMP  Recent Labs      12/05/18   0050   NA  144   K  4.4   CL  106   CO2  28   BUN  23   CREATININE  1.7*   CALCIUM  10.0     Liver Function  Recent Labs      12/05/18   0050  12/05/18   0950   LIPASE   --   2,278*   BILITOT  0.5   --    AST  36*   --    ALT  30   --    ALKPHOS  133*   --    PROT  6.6   --    LABALBU  4.0   --      No results for input(s): LACTATE in the last 72 hours.   No results for input(s): INR, PTT in the last 72 hours. Invalid input(s): PT    RADIOLOGY  Ct Abdomen Pelvis Wo Contrast Additional Contrast? None    Result Date: 2018  Patient MRN:  [id number] : 1955 Age: 59 years Gender: Female Order Date:  2018 1:45 PM TECHNIQUE/NUMBER OF IMAGES/COMPARISON/CLINICAL HISTORY: CT abdomen and pelvis Axial images were obtained with sagittal and coronal reconstructions . 364 images History subacute pancreatitis. FINDINGS: There is a hydrops of the gallbladder measuring 12.5 x 4.5 x 5.1 cm with significant thickening of the wall close to 9 mm there are . Tiny gallstones seen in the compressibility of the gallbladder . There is also some increased since towards the neck of the gallbladder but difficult to be defined on this study . There is some dilatation of the intrahepatic biliary tree but there is also some degree of periportal edema. The common bile duct can be traced measuring close to 8 mm as it enters the head of the pancreas . Slightly increased density seen in the remaining of the common bile duct distally . This can be manifestation of choledocholithiasis. There are signs for inflammatory reaction in the anterior left para renal retroperitoneal space at and inferior to the tail of the pancreas are. In some areas there is ill-defined peripancreatic fat planes . Cannot see specific a focal enlargement of the pancreas but the present study is done without oral and IV contrast makes it difficult to evaluate the anatomy in fine detail. Acute pancreatitis is considered . Liver has borderline size. The spleen appear unremarkable. Adrenal glands are not enlarged . Kidneys have preserved size and cortical thickness, there is no hydronephrosis. Some free fluid accumulation is seen in the cul-de-sac. In presence of acute pancreatitis this can represent discrete ascites. There is a pattern for constipation with fair amount of fecal contents throughout the entire colon .  There is no indication for bowel obstruction. Appendix is seen and has unremarkable appearance . There is no pericolonic inflammatory process . The stranding of fat planes in the left upper quadrant are related with the pancreas. Aorta and IVC appear unremarkable . The uterus appears unremarkable for the age group. The ovaries are difficult to separate from adjacent bowel segments, they are not enlarged. The bladder is not distended. Discrete thickening of the bladder wall is observed. There is a left discrete left curvature for the lumbar spine. Lower lung bases appear unremarkable. Discrete peripheral linear interstitial pattern is seen in the right middle lobe peripherally. No pleural effusions are seen. Degenerative changes are seen predominant in the L5-S1 disc space . There is also some degenerative changes in facet joints predominant on the right concave side of the curvature at the L3-4 and L4-5 level. 1. Hydrops appearance of the gallbladder with diffuse thickening of the gallbladder wall. Tiny gallstones are seen in the lumen of the gallbladder. 2. Mild dilatation of the biliary tree. Presence of periportal edema . Cannot exclude choledocholithiasis in the distal CBD . 3. Some inflammatory changes seen in the area of the pancreas extending into the anterior left pararenal retroperitoneal spaces at and below the level of the tail of the pancreas. Findings compatible with acute to mild edema to suggest pancreatitis. 4. Small free fluid collection the cul-de-sac can be relate to pancreatic ascites . 5. Further evaluation with the gallbladder ultrasound and with the MRI study/MRCP is recommended. Case was discussed with Dr. Jemima Wahl, ER physician.  ALERT:  THIS IS AN ABNORMAL REPORT     Us Gallbladder Ruq    Result Date: 2018  Patient MRN: [id number] : 1955 Age:  59 years Gender: Female Order Date: 2018 4:00 PM Exam: US GALLBLADDER RUQ Number of Images: 45 Indication:  Cholecystitis, pain, nausea and vomiting Comparison: CT

## 2018-12-07 VITALS
TEMPERATURE: 99 F | SYSTOLIC BLOOD PRESSURE: 111 MMHG | HEIGHT: 68 IN | WEIGHT: 124 LBS | RESPIRATION RATE: 18 BRPM | OXYGEN SATURATION: 95 % | BODY MASS INDEX: 18.79 KG/M2 | DIASTOLIC BLOOD PRESSURE: 55 MMHG | HEART RATE: 73 BPM

## 2018-12-07 LAB
ALBUMIN SERPL-MCNC: 3.4 G/DL (ref 3.5–5.2)
ALP BLD-CCNC: 141 U/L (ref 35–104)
ALT SERPL-CCNC: 89 U/L (ref 0–32)
ANION GAP SERPL CALCULATED.3IONS-SCNC: 13 MMOL/L (ref 7–16)
AST SERPL-CCNC: 77 U/L (ref 0–31)
BILIRUB SERPL-MCNC: 0.3 MG/DL (ref 0–1.2)
BUN BLDV-MCNC: 28 MG/DL (ref 8–23)
CALCIUM SERPL-MCNC: 9 MG/DL (ref 8.6–10.2)
CHLORIDE BLD-SCNC: 106 MMOL/L (ref 98–107)
CO2: 22 MMOL/L (ref 22–29)
CREAT SERPL-MCNC: 1.8 MG/DL (ref 0.5–1)
GFR AFRICAN AMERICAN: 34
GFR NON-AFRICAN AMERICAN: 29 ML/MIN/1.73
GLUCOSE BLD-MCNC: 90 MG/DL (ref 74–99)
LIPASE: 116 U/L (ref 13–60)
POTASSIUM SERPL-SCNC: 4.1 MMOL/L (ref 3.5–5)
SODIUM BLD-SCNC: 141 MMOL/L (ref 132–146)
TOTAL PROTEIN: 5.5 G/DL (ref 6.4–8.3)

## 2018-12-07 PROCEDURE — 36415 COLL VENOUS BLD VENIPUNCTURE: CPT

## 2018-12-07 PROCEDURE — 83690 ASSAY OF LIPASE: CPT

## 2018-12-07 PROCEDURE — 80053 COMPREHEN METABOLIC PANEL: CPT

## 2018-12-07 PROCEDURE — 2500000003 HC RX 250 WO HCPCS: Performed by: STUDENT IN AN ORGANIZED HEALTH CARE EDUCATION/TRAINING PROGRAM

## 2018-12-07 PROCEDURE — 2580000003 HC RX 258: Performed by: FAMILY MEDICINE

## 2018-12-07 PROCEDURE — 6370000000 HC RX 637 (ALT 250 FOR IP): Performed by: FAMILY MEDICINE

## 2018-12-07 RX ORDER — HYDROCODONE BITARTRATE AND ACETAMINOPHEN 5; 325 MG/1; MG/1
1 TABLET ORAL EVERY 6 HOURS PRN
Qty: 28 TABLET | Refills: 0 | Status: SHIPPED | OUTPATIENT
Start: 2018-12-07 | End: 2018-12-14

## 2018-12-07 RX ADMIN — SODIUM CHLORIDE: 9 INJECTION, SOLUTION INTRAVENOUS at 05:06

## 2018-12-07 RX ADMIN — METRONIDAZOLE 500 MG: 500 INJECTION, SOLUTION INTRAVENOUS at 04:59

## 2018-12-07 RX ADMIN — ACETAMINOPHEN 650 MG: 325 TABLET, FILM COATED ORAL at 07:03

## 2018-12-07 RX ADMIN — Medication 10 ML: at 08:26

## 2018-12-07 ASSESSMENT — PAIN DESCRIPTION - PAIN TYPE
TYPE: SURGICAL PAIN
TYPE: ACUTE PAIN;SURGICAL PAIN;CHRONIC PAIN

## 2018-12-07 ASSESSMENT — PAIN SCALES - GENERAL
PAINLEVEL_OUTOF10: 0
PAINLEVEL_OUTOF10: 0
PAINLEVEL_OUTOF10: 5

## 2018-12-07 ASSESSMENT — PAIN DESCRIPTION - FREQUENCY: FREQUENCY: CONTINUOUS

## 2018-12-07 ASSESSMENT — PAIN DESCRIPTION - DESCRIPTORS: DESCRIPTORS: ACHING;SORE;DISCOMFORT

## 2018-12-07 ASSESSMENT — PAIN DESCRIPTION - ORIENTATION: ORIENTATION: MID

## 2018-12-07 ASSESSMENT — PAIN DESCRIPTION - LOCATION: LOCATION: ABDOMEN

## 2018-12-08 LAB — TACROLIMUS BLOOD: <1 NG/ML

## 2018-12-10 LAB
BLOOD CULTURE, ROUTINE: NORMAL
CULTURE, BLOOD 2: NORMAL

## 2019-02-08 ENCOUNTER — HOSPITAL ENCOUNTER (OUTPATIENT)
Age: 64
Discharge: HOME | End: 2019-02-10

## 2019-02-08 PROCEDURE — 88305 TISSUE EXAM BY PATHOLOGIST: CPT

## 2020-05-19 PROBLEM — R09.89: Status: ACTIVE | Noted: 2020-05-19

## 2020-05-20 ENCOUNTER — APPOINTMENT (OUTPATIENT)
Dept: GENERAL RADIOLOGY | Age: 65
DRG: 291 | End: 2020-05-20
Payer: COMMERCIAL

## 2020-05-20 ENCOUNTER — APPOINTMENT (OUTPATIENT)
Dept: CT IMAGING | Age: 65
DRG: 291 | End: 2020-05-20
Payer: COMMERCIAL

## 2020-05-20 ENCOUNTER — HOSPITAL ENCOUNTER (INPATIENT)
Age: 65
LOS: 2 days | Discharge: ANOTHER HOSPITAL | DRG: 291 | End: 2020-05-22
Attending: EMERGENCY MEDICINE | Admitting: FAMILY MEDICINE
Payer: COMMERCIAL

## 2020-05-20 PROBLEM — I50.9 ACUTE CONGESTIVE HEART FAILURE (HCC): Status: ACTIVE | Noted: 2020-05-20

## 2020-05-20 LAB
ALBUMIN SERPL-MCNC: 4.2 G/DL (ref 3.5–5.2)
ALP BLD-CCNC: 92 U/L (ref 35–104)
ALT SERPL-CCNC: 59 U/L (ref 0–32)
ANION GAP SERPL CALCULATED.3IONS-SCNC: 11 MMOL/L (ref 7–16)
AST SERPL-CCNC: 58 U/L (ref 0–31)
BASOPHILS ABSOLUTE: 0.06 E9/L (ref 0–0.2)
BASOPHILS RELATIVE PERCENT: 1.2 % (ref 0–2)
BILIRUB SERPL-MCNC: 0.5 MG/DL (ref 0–1.2)
BUN BLDV-MCNC: 22 MG/DL (ref 8–23)
CALCIUM SERPL-MCNC: 9.5 MG/DL (ref 8.6–10.2)
CHLORIDE BLD-SCNC: 103 MMOL/L (ref 98–107)
CO2: 25 MMOL/L (ref 22–29)
CREAT SERPL-MCNC: 1.5 MG/DL (ref 0.5–1)
EOSINOPHILS ABSOLUTE: 0.39 E9/L (ref 0.05–0.5)
EOSINOPHILS RELATIVE PERCENT: 7.6 % (ref 0–6)
GFR AFRICAN AMERICAN: 42
GFR NON-AFRICAN AMERICAN: 35 ML/MIN/1.73
GLUCOSE BLD-MCNC: 104 MG/DL (ref 74–99)
HCT VFR BLD CALC: 40.4 % (ref 34–48)
HEMOGLOBIN: 13.4 G/DL (ref 11.5–15.5)
IMMATURE GRANULOCYTES #: 0.02 E9/L
IMMATURE GRANULOCYTES %: 0.4 % (ref 0–5)
LACTIC ACID, SEPSIS: 2.1 MMOL/L (ref 0.5–1.9)
LYMPHOCYTES ABSOLUTE: 1.69 E9/L (ref 1.5–4)
LYMPHOCYTES RELATIVE PERCENT: 32.8 % (ref 20–42)
MCH RBC QN AUTO: 31.3 PG (ref 26–35)
MCHC RBC AUTO-ENTMCNC: 33.2 % (ref 32–34.5)
MCV RBC AUTO: 94.4 FL (ref 80–99.9)
MONOCYTES ABSOLUTE: 0.48 E9/L (ref 0.1–0.95)
MONOCYTES RELATIVE PERCENT: 9.3 % (ref 2–12)
NEUTROPHILS ABSOLUTE: 2.51 E9/L (ref 1.8–7.3)
NEUTROPHILS RELATIVE PERCENT: 48.7 % (ref 43–80)
PDW BLD-RTO: 13.4 FL (ref 11.5–15)
PLATELET # BLD: 233 E9/L (ref 130–450)
PMV BLD AUTO: 8.9 FL (ref 7–12)
POTASSIUM SERPL-SCNC: 3.2 MMOL/L (ref 3.5–5)
PRO-BNP: ABNORMAL PG/ML (ref 0–125)
RBC # BLD: 4.28 E12/L (ref 3.5–5.5)
SARS-COV-2, NAAT: NOT DETECTED
SODIUM BLD-SCNC: 139 MMOL/L (ref 132–146)
TOTAL PROTEIN: 6.6 G/DL (ref 6.4–8.3)
TROPONIN: <0.01 NG/ML (ref 0–0.03)
WBC # BLD: 5.2 E9/L (ref 4.5–11.5)

## 2020-05-20 PROCEDURE — 71275 CT ANGIOGRAPHY CHEST: CPT

## 2020-05-20 PROCEDURE — 83605 ASSAY OF LACTIC ACID: CPT

## 2020-05-20 PROCEDURE — 96374 THER/PROPH/DIAG INJ IV PUSH: CPT

## 2020-05-20 PROCEDURE — 94760 N-INVAS EAR/PLS OXIMETRY 1: CPT

## 2020-05-20 PROCEDURE — 80053 COMPREHEN METABOLIC PANEL: CPT

## 2020-05-20 PROCEDURE — 2060000000 HC ICU INTERMEDIATE R&B

## 2020-05-20 PROCEDURE — 84484 ASSAY OF TROPONIN QUANT: CPT

## 2020-05-20 PROCEDURE — 6360000002 HC RX W HCPCS: Performed by: EMERGENCY MEDICINE

## 2020-05-20 PROCEDURE — 6360000004 HC RX CONTRAST MEDICATION: Performed by: RADIOLOGY

## 2020-05-20 PROCEDURE — 85025 COMPLETE CBC W/AUTO DIFF WBC: CPT

## 2020-05-20 PROCEDURE — U0002 COVID-19 LAB TEST NON-CDC: HCPCS

## 2020-05-20 PROCEDURE — 93005 ELECTROCARDIOGRAM TRACING: CPT | Performed by: EMERGENCY MEDICINE

## 2020-05-20 PROCEDURE — 83880 ASSAY OF NATRIURETIC PEPTIDE: CPT

## 2020-05-20 PROCEDURE — 6370000000 HC RX 637 (ALT 250 FOR IP): Performed by: EMERGENCY MEDICINE

## 2020-05-20 PROCEDURE — 71045 X-RAY EXAM CHEST 1 VIEW: CPT

## 2020-05-20 PROCEDURE — 99291 CRITICAL CARE FIRST HOUR: CPT

## 2020-05-20 RX ORDER — SODIUM CHLORIDE 0.9 % (FLUSH) 0.9 %
10 SYRINGE (ML) INJECTION EVERY 12 HOURS SCHEDULED
Status: DISCONTINUED | OUTPATIENT
Start: 2020-05-20 | End: 2020-05-22 | Stop reason: HOSPADM

## 2020-05-20 RX ORDER — ASPIRIN 325 MG
325 TABLET ORAL ONCE
Status: COMPLETED | OUTPATIENT
Start: 2020-05-20 | End: 2020-05-20

## 2020-05-20 RX ORDER — 0.9 % SODIUM CHLORIDE 0.9 %
1000 INTRAVENOUS SOLUTION INTRAVENOUS ONCE
Status: DISCONTINUED | OUTPATIENT
Start: 2020-05-20 | End: 2020-05-20

## 2020-05-20 RX ORDER — POTASSIUM CHLORIDE 20 MEQ/1
40 TABLET, EXTENDED RELEASE ORAL ONCE
Status: COMPLETED | OUTPATIENT
Start: 2020-05-20 | End: 2020-05-20

## 2020-05-20 RX ORDER — FUROSEMIDE 10 MG/ML
40 INJECTION INTRAMUSCULAR; INTRAVENOUS ONCE
Status: COMPLETED | OUTPATIENT
Start: 2020-05-20 | End: 2020-05-20

## 2020-05-20 RX ORDER — NITROGLYCERIN 0.4 MG/1
0.4 TABLET SUBLINGUAL EVERY 5 MIN PRN
Status: DISCONTINUED | OUTPATIENT
Start: 2020-05-20 | End: 2020-05-22 | Stop reason: HOSPADM

## 2020-05-20 RX ORDER — ACETAMINOPHEN 325 MG/1
650 TABLET ORAL ONCE
Status: COMPLETED | OUTPATIENT
Start: 2020-05-20 | End: 2020-05-20

## 2020-05-20 RX ORDER — ACETAMINOPHEN 325 MG/1
650 TABLET ORAL EVERY 4 HOURS PRN
Status: DISCONTINUED | OUTPATIENT
Start: 2020-05-20 | End: 2020-05-22 | Stop reason: HOSPADM

## 2020-05-20 RX ORDER — SODIUM CHLORIDE 0.9 % (FLUSH) 0.9 %
10 SYRINGE (ML) INJECTION PRN
Status: DISCONTINUED | OUTPATIENT
Start: 2020-05-20 | End: 2020-05-22 | Stop reason: HOSPADM

## 2020-05-20 RX ADMIN — IOPAMIDOL 70 ML: 755 INJECTION, SOLUTION INTRAVENOUS at 22:45

## 2020-05-20 RX ADMIN — FUROSEMIDE 40 MG: 10 INJECTION, SOLUTION INTRAMUSCULAR; INTRAVENOUS at 23:15

## 2020-05-20 RX ADMIN — ACETAMINOPHEN 650 MG: 325 TABLET ORAL at 23:21

## 2020-05-20 RX ADMIN — NITROGLYCERIN 0.4 MG: 0.4 TABLET, ORALLY DISINTEGRATING SUBLINGUAL at 23:14

## 2020-05-20 RX ADMIN — NITROGLYCERIN 0.4 MG: 0.4 TABLET, ORALLY DISINTEGRATING SUBLINGUAL at 23:23

## 2020-05-20 RX ADMIN — POTASSIUM CHLORIDE 40 MEQ: 20 TABLET, EXTENDED RELEASE ORAL at 22:24

## 2020-05-20 RX ADMIN — ASPIRIN 325 MG: 325 TABLET, FILM COATED ORAL at 22:24

## 2020-05-20 ASSESSMENT — PAIN SCALES - GENERAL: PAINLEVEL_OUTOF10: 4

## 2020-05-21 LAB
ALBUMIN SERPL-MCNC: 4.2 G/DL (ref 3.5–5.2)
ALP BLD-CCNC: 89 U/L (ref 35–104)
ALT SERPL-CCNC: 64 U/L (ref 0–32)
ANION GAP SERPL CALCULATED.3IONS-SCNC: 12 MMOL/L (ref 7–16)
AST SERPL-CCNC: 62 U/L (ref 0–31)
BASOPHILS ABSOLUTE: 0.04 E9/L (ref 0–0.2)
BASOPHILS RELATIVE PERCENT: 0.7 % (ref 0–2)
BILIRUB SERPL-MCNC: 0.8 MG/DL (ref 0–1.2)
BUN BLDV-MCNC: 19 MG/DL (ref 8–23)
CALCIUM SERPL-MCNC: 9.4 MG/DL (ref 8.6–10.2)
CHLORIDE BLD-SCNC: 103 MMOL/L (ref 98–107)
CO2: 27 MMOL/L (ref 22–29)
CREAT SERPL-MCNC: 1.4 MG/DL (ref 0.5–1)
EKG ATRIAL RATE: 97 BPM
EKG P AXIS: 80 DEGREES
EKG P-R INTERVAL: 170 MS
EKG Q-T INTERVAL: 412 MS
EKG QRS DURATION: 140 MS
EKG QTC CALCULATION (BAZETT): 523 MS
EKG R AXIS: -49 DEGREES
EKG T AXIS: 112 DEGREES
EKG VENTRICULAR RATE: 97 BPM
EOSINOPHILS ABSOLUTE: 0.1 E9/L (ref 0.05–0.5)
EOSINOPHILS RELATIVE PERCENT: 1.9 % (ref 0–6)
GFR AFRICAN AMERICAN: 46
GFR NON-AFRICAN AMERICAN: 38 ML/MIN/1.73
GLUCOSE BLD-MCNC: 109 MG/DL (ref 74–99)
HCT VFR BLD CALC: 39.9 % (ref 34–48)
HEMOGLOBIN: 13.3 G/DL (ref 11.5–15.5)
IMMATURE GRANULOCYTES #: 0.02 E9/L
IMMATURE GRANULOCYTES %: 0.4 % (ref 0–5)
LV EF: 18 %
LVEF MODALITY: NORMAL
LYMPHOCYTES ABSOLUTE: 1.01 E9/L (ref 1.5–4)
LYMPHOCYTES RELATIVE PERCENT: 18.8 % (ref 20–42)
MCH RBC QN AUTO: 30.6 PG (ref 26–35)
MCHC RBC AUTO-ENTMCNC: 33.3 % (ref 32–34.5)
MCV RBC AUTO: 91.7 FL (ref 80–99.9)
MONOCYTES ABSOLUTE: 0.45 E9/L (ref 0.1–0.95)
MONOCYTES RELATIVE PERCENT: 8.4 % (ref 2–12)
NEUTROPHILS ABSOLUTE: 3.74 E9/L (ref 1.8–7.3)
NEUTROPHILS RELATIVE PERCENT: 69.8 % (ref 43–80)
PDW BLD-RTO: 13.2 FL (ref 11.5–15)
PLATELET # BLD: 236 E9/L (ref 130–450)
PMV BLD AUTO: 8.5 FL (ref 7–12)
POTASSIUM REFLEX MAGNESIUM: 3.7 MMOL/L (ref 3.5–5)
RBC # BLD: 4.35 E12/L (ref 3.5–5.5)
SODIUM BLD-SCNC: 142 MMOL/L (ref 132–146)
TOTAL PROTEIN: 6.6 G/DL (ref 6.4–8.3)
TROPONIN: 0.02 NG/ML (ref 0–0.03)
WBC # BLD: 5.4 E9/L (ref 4.5–11.5)

## 2020-05-21 PROCEDURE — 36415 COLL VENOUS BLD VENIPUNCTURE: CPT

## 2020-05-21 PROCEDURE — 6370000000 HC RX 637 (ALT 250 FOR IP): Performed by: FAMILY MEDICINE

## 2020-05-21 PROCEDURE — 93306 TTE W/DOPPLER COMPLETE: CPT

## 2020-05-21 PROCEDURE — 93010 ELECTROCARDIOGRAM REPORT: CPT | Performed by: INTERNAL MEDICINE

## 2020-05-21 PROCEDURE — 2060000000 HC ICU INTERMEDIATE R&B

## 2020-05-21 PROCEDURE — APPSS60 APP SPLIT SHARED TIME 46-60 MINUTES: Performed by: NURSE PRACTITIONER

## 2020-05-21 PROCEDURE — 6360000002 HC RX W HCPCS: Performed by: FAMILY MEDICINE

## 2020-05-21 PROCEDURE — 84484 ASSAY OF TROPONIN QUANT: CPT

## 2020-05-21 PROCEDURE — 6370000000 HC RX 637 (ALT 250 FOR IP): Performed by: NURSE PRACTITIONER

## 2020-05-21 PROCEDURE — 2580000003 HC RX 258: Performed by: FAMILY MEDICINE

## 2020-05-21 PROCEDURE — 6360000002 HC RX W HCPCS: Performed by: NURSE PRACTITIONER

## 2020-05-21 PROCEDURE — 80053 COMPREHEN METABOLIC PANEL: CPT

## 2020-05-21 PROCEDURE — 99223 1ST HOSP IP/OBS HIGH 75: CPT | Performed by: INTERNAL MEDICINE

## 2020-05-21 PROCEDURE — 85025 COMPLETE CBC W/AUTO DIFF WBC: CPT

## 2020-05-21 RX ORDER — FUROSEMIDE 10 MG/ML
40 INJECTION INTRAMUSCULAR; INTRAVENOUS 2 TIMES DAILY
Status: DISCONTINUED | OUTPATIENT
Start: 2020-05-21 | End: 2020-05-22 | Stop reason: HOSPADM

## 2020-05-21 RX ORDER — GABAPENTIN 300 MG/1
300 CAPSULE ORAL DAILY
Status: DISCONTINUED | OUTPATIENT
Start: 2020-05-21 | End: 2020-05-22 | Stop reason: HOSPADM

## 2020-05-21 RX ORDER — HEPARIN SODIUM 10000 [USP'U]/ML
5000 INJECTION, SOLUTION INTRAVENOUS; SUBCUTANEOUS EVERY 8 HOURS
Status: DISCONTINUED | OUTPATIENT
Start: 2020-05-21 | End: 2020-05-22 | Stop reason: HOSPADM

## 2020-05-21 RX ORDER — M-VIT,TX,IRON,MINS/CALC/FOLIC 27MG-0.4MG
1 TABLET ORAL DAILY
Status: DISCONTINUED | OUTPATIENT
Start: 2020-05-21 | End: 2020-05-22 | Stop reason: HOSPADM

## 2020-05-21 RX ORDER — POTASSIUM CHLORIDE 20 MEQ/1
20 TABLET, EXTENDED RELEASE ORAL 2 TIMES DAILY WITH MEALS
Status: DISCONTINUED | OUTPATIENT
Start: 2020-05-21 | End: 2020-05-22 | Stop reason: HOSPADM

## 2020-05-21 RX ORDER — METOPROLOL SUCCINATE 25 MG/1
25 TABLET, EXTENDED RELEASE ORAL DAILY
Status: DISCONTINUED | OUTPATIENT
Start: 2020-05-21 | End: 2020-05-22 | Stop reason: HOSPADM

## 2020-05-21 RX ORDER — ACYCLOVIR 200 MG/1
400 CAPSULE ORAL 2 TIMES DAILY
Status: DISCONTINUED | OUTPATIENT
Start: 2020-05-21 | End: 2020-05-22 | Stop reason: HOSPADM

## 2020-05-21 RX ORDER — GABAPENTIN 400 MG/1
400 CAPSULE ORAL NIGHTLY
Status: DISCONTINUED | OUTPATIENT
Start: 2020-05-21 | End: 2020-05-22 | Stop reason: HOSPADM

## 2020-05-21 RX ORDER — CHOLECALCIFEROL (VITAMIN D3) 50 MCG
2000 TABLET ORAL DAILY
Status: DISCONTINUED | OUTPATIENT
Start: 2020-05-21 | End: 2020-05-22 | Stop reason: HOSPADM

## 2020-05-21 RX ORDER — POTASSIUM CHLORIDE 20 MEQ/1
20 TABLET, EXTENDED RELEASE ORAL ONCE
Status: COMPLETED | OUTPATIENT
Start: 2020-05-21 | End: 2020-05-21

## 2020-05-21 RX ORDER — TRIAMCINOLONE ACETONIDE 1 MG/G
CREAM TOPICAL 2 TIMES DAILY
Status: DISCONTINUED | OUTPATIENT
Start: 2020-05-22 | End: 2020-05-22 | Stop reason: HOSPADM

## 2020-05-21 RX ORDER — ALLOPURINOL 100 MG/1
100 TABLET ORAL DAILY
Status: DISCONTINUED | OUTPATIENT
Start: 2020-05-21 | End: 2020-05-22 | Stop reason: HOSPADM

## 2020-05-21 RX ADMIN — ACYCLOVIR 400 MG: 200 CAPSULE ORAL at 02:33

## 2020-05-21 RX ADMIN — POTASSIUM CHLORIDE 20 MEQ: 20 TABLET, EXTENDED RELEASE ORAL at 09:15

## 2020-05-21 RX ADMIN — GABAPENTIN 300 MG: 300 CAPSULE ORAL at 09:15

## 2020-05-21 RX ADMIN — ALLOPURINOL 100 MG: 100 TABLET ORAL at 09:15

## 2020-05-21 RX ADMIN — ACYCLOVIR 400 MG: 200 CAPSULE ORAL at 20:06

## 2020-05-21 RX ADMIN — Medication 10 ML: at 09:15

## 2020-05-21 RX ADMIN — ACYCLOVIR 400 MG: 200 CAPSULE ORAL at 09:14

## 2020-05-21 RX ADMIN — SACUBITRIL AND VALSARTAN 1 TABLET: 24; 26 TABLET, FILM COATED ORAL at 10:40

## 2020-05-21 RX ADMIN — FUROSEMIDE 40 MG: 10 INJECTION, SOLUTION INTRAMUSCULAR; INTRAVENOUS at 17:00

## 2020-05-21 RX ADMIN — Medication 1 TABLET: at 09:14

## 2020-05-21 RX ADMIN — METOPROLOL SUCCINATE 25 MG: 25 TABLET, EXTENDED RELEASE ORAL at 10:40

## 2020-05-21 RX ADMIN — SODIUM CHLORIDE, PRESERVATIVE FREE 10 ML: 5 INJECTION INTRAVENOUS at 16:55

## 2020-05-21 RX ADMIN — POTASSIUM CHLORIDE 20 MEQ: 20 TABLET, EXTENDED RELEASE ORAL at 16:54

## 2020-05-21 RX ADMIN — SODIUM CHLORIDE, PRESERVATIVE FREE 10 ML: 5 INJECTION INTRAVENOUS at 10:41

## 2020-05-21 RX ADMIN — HEPARIN SODIUM 5000 UNITS: 10000 INJECTION INTRAVENOUS; SUBCUTANEOUS at 23:46

## 2020-05-21 RX ADMIN — SACUBITRIL AND VALSARTAN 1 TABLET: 24; 26 TABLET, FILM COATED ORAL at 21:41

## 2020-05-21 RX ADMIN — GABAPENTIN 400 MG: 400 CAPSULE ORAL at 20:06

## 2020-05-21 RX ADMIN — CHOLECALCIFEROL TAB 50 MCG (2000 UNIT) 2000 UNITS: 50 TAB at 09:14

## 2020-05-21 RX ADMIN — FUROSEMIDE 40 MG: 10 INJECTION, SOLUTION INTRAMUSCULAR; INTRAVENOUS at 10:41

## 2020-05-21 RX ADMIN — Medication 10 ML: at 20:07

## 2020-05-21 RX ADMIN — HEPARIN SODIUM 5000 UNITS: 10000 INJECTION INTRAVENOUS; SUBCUTANEOUS at 09:15

## 2020-05-21 RX ADMIN — HEPARIN SODIUM 5000 UNITS: 10000 INJECTION INTRAVENOUS; SUBCUTANEOUS at 16:15

## 2020-05-21 RX ADMIN — Medication 10 ML: at 02:33

## 2020-05-21 ASSESSMENT — PAIN SCALES - GENERAL
PAINLEVEL_OUTOF10: 0

## 2020-05-21 NOTE — CARE COORDINATION
Social work / Discharge planning:       Social work spoke to patient via phone for initial assessment. She lives with her  and uses no DME. Patient reports being very independent and drives. She used HC \"many years ago\" but cannot recall which one. No history of GENTRY. Patient is currently using 02 which she does not have at home. Discharge plan is home. Patient declines home care at this time.  Electronically signed by EDNA Johnson on 5/21/2020 at 11:15 AM

## 2020-05-21 NOTE — CONSULTS
Inpatient Cardiology Consultation      Reason for Consult:  CHF    Consulting Physician: Dr. Vivien Culp    Requesting Physician:  Dr. Binh Peralta    Date of Consultation: 5/21/2020    HISTORY OF PRESENT ILLNESS:   Ms. Nancy Quarles is a 62year old female who was seen once by Dr. Sharlene Miller in hospital consultation on 8/28/2012 for Atypical CP (Left supramammary chest pressure, radiating into the neck / associated with diaphoresis and SOB, occurring while bending over to turn on the TV), Negative CE. She underwent a nonischemic treadmill nuclear stress test, EF 51%. Patient was evaluated by Dr. Roney Jenkins at [facility] for follow-up of discussion of cardiac risk prior to radiation therapy and cancer therapy. Most recent office note (obtained and scanned into media) on 1/17/2018. PMH: HTN, History of right breast CA (2004) s/p lumpectomy, chemo and radiation, Hx of acute myeloid leukemia in remission (, CKD (baseline 1.3-1.5), normal LVEF on TTE 1/2018 (). Patient is a limited historian. Patient was seen in outpatient with her PCP (Dr. Binh Peralta) on 5/19/2020 with complaints of \"gurgling breath sounds\" while laying flat in bed x 1 week and often waking up with a feeling of her heart racing per documentation. Plan: if worsening symptoms obtain CXR. [facility] 5/20/2020 with complaints of \"gurgling breath sounds\"/wheezing, fatigue and more recently LUCERO with ambulating up 1 flight of steps over the past 1 to 2 weeks. Patient was active on 5/20/2020 however when she came home she felt more fatigued and laid down to take a nap. When she was lying in bed she felt slightly lightheaded and again had \"gurgling noises\" coming from her throat. She attempted to get up and walk around but the gurgling persisted and she presented to the ED for further evaluation and management. Denies orthopnea, PND, chest pain or palpitations.   She reports a 5 pound weight gain over the past week with mild LAFB  6. Left to right interatrial shunting on TTE 5/21/2020.   7. HTN: controlled 115//95.   8. Acute on CKD: (Baseline 1.3-1.5): SCr 1.5>>1.4 (improving with IV diuresis)   8. Hx of Right breast CA s/p chemo, radiation and lumpectomy (2004) / treated with doxorubicin. 9. History of acute myeloid leukemia status post chemo, radiation and stem cell transplant 2017, [facility]) / with idarubicin. Previously on Immunosuppression (stopped in 12/2019). 10. History of cholecystectomy  11. Elevated hepatocellular enzymes     See Dr. Baltazar Zamora below    Discussed with Dr. Juvencio Ortiz.  Electronically signed by CHRISSY Serrano CNP on 5/21/20 at 9:49 AM EDT    _______________________________________________________________________  I independently interviewed and examined the patient. I have reviewed the above documentation completed by the GUILLERMINA. Please see my additional contributions to the HPI, physical exam, and assessment / medical decision making. HPI, ROS, PMH, PSH, [address], [place], and medications independently reviewed (agree; see above documentation)    History of Present Illness:  Admitted for further evaluation of shortness of breath, LE edema, and orthopnea. Currently with no chest pain, respiratory distress, or palpitations at rest. SR on EKG and telemetry.     Review of Systems:   Cardiac: As per HPI  General: No fever, chills  Pulmonary: As per HPI  HEENT: No visual disturbances, difficult swallowing  GI: No nausea, vomiting  Musculoskeletal: MENENDEZ x 4, no focal motor deficits  Skin: Intact, no rashes  Neuro/Psych: No headache or seizures    Physical Exam:  /73   Pulse 82   Temp 99.6 °F (37.6 °C) (Oral)   Resp 16   Ht 5' 8\" (1.727 m)   Wt 143 lb (64.9 kg)   SpO2 94%   BMI 21.74 kg/m²   Wt Readings from Last 3 Encounters:   05/21/20 143 lb (64.9 kg)   05/19/20 149 lb (67.6 kg)   12/05/18 124 lb (56.2 kg)     Appearance: Awake, alert, no acute respiratory distress  Skin: Intact, no rash  Head:

## 2020-05-21 NOTE — CONSULTS
finger ganglion removal    TUBAL LIGATION         No family history on file. Social History:   Social History     Socioeconomic History    Marital status:      Spouse name: Not on file    Number of children: Not on file    Years of education: Not on file    Highest education level: Not on file   Occupational History    Not on file   Social Needs    Financial resource strain: Not on file    Food insecurity     Worry: Not on file     Inability: Not on file    Transportation needs     Medical: Not on file     Non-medical: Not on file   Tobacco Use    Smoking status: Never Smoker    Smokeless tobacco: Never Used   Substance and Sexual Activity    Alcohol use: Yes     Comment: occas    Drug use: No    Sexual activity: Not on file   Lifestyle    Physical activity     Days per week: Not on file     Minutes per session: Not on file    Stress: Not on file   Relationships    Social connections     Talks on phone: Not on file     Gets together: Not on file     Attends Synagogue service: Not on file     Active member of club or organization: Not on file     Attends meetings of clubs or organizations: Not on file     Relationship status: Not on file    Intimate partner violence     Fear of current or ex partner: Not on file     Emotionally abused: Not on file     Physically abused: Not on file     Forced sexual activity: Not on file   Other Topics Concern    Not on file   Social History Narrative    Not on file     Smoking history: The patient is a Never smoker of @year@. ETOH:   reports current alcohol use. Exposures: There  is not history of TB or TB exposure. There is not asbestos or silica dust exposure. The patient reports does not have coal, foundry, quarry or [employer] exposure. Recent travel history none. There is not  history of recreational or IV drug use. There is not hot tub exposure. The patient does not have any exotic pets, turtles or exotic birds.        Vaccines: Immunization History   Administered Date(s) Administered    Hepatitis B 07/16/2018, 09/11/2018    Hib, unspecified 07/16/2018, 09/11/2018    Influenza Vaccine, unspecified formulation 10/08/2018    Influenza, Quadv, IM, PF (6 mo and older Fluzone, Flulaval, Fluarix, and 3 yrs and older Afluria) 11/07/2019    Pneumococcal Conjugate 13-valent (Tpfidrm23) 07/16/2018, 09/11/2018, 12/10/2018    Polio Virus Vaccine 07/16/2018, 09/11/2018    Tdap (Boostrix, Adacel) 07/16/2018, 09/11/2018        Home Meds: Medications Prior to Admission: allopurinol (ZYLOPRIM) 100 MG tablet, Take by mouth  triamcinolone (KENALOG) 0.1 % cream, Apply topically  acyclovir (ZOVIRAX) 200 MG capsule, Take 400 mg by mouth 2 times daily   Cholecalciferol 2000 units CAPS, Take 1 capsule by mouth daily  gabapentin (NEURONTIN) 100 MG capsule, Take 300 mg by mouth See Admin Instructions. 300 MG QAM  MG QHS  therapeutic multivitamin-minerals (THERAGRAN-M) tablet, Take 1 tablet by mouth daily. [DISCONTINUED] pantoprazole (PROTONIX) 40 MG tablet, Take 40 mg by mouth Daily with supper     CURRENT MEDS :  Scheduled Meds:   acyclovir  400 mg Oral BID    allopurinol  100 mg Oral Daily    vitamin D  2,000 Units Oral Daily    gabapentin  300 mg Oral Daily    gabapentin  400 mg Oral Nightly    therapeutic multivitamin-minerals  1 tablet Oral Daily    [START ON 5/22/2020] triamcinolone   Topical BID    heparin (porcine)  5,000 Units Subcutaneous Q8H    furosemide  40 mg Intravenous BID    potassium chloride  20 mEq Oral BID WC    metoprolol succinate  25 mg Oral Daily    sacubitril-valsartan  1 tablet Oral BID    sodium chloride flush  10 mL Intravenous 2 times per day       Continuous Infusions:       Allergies   Allergen Reactions    Penicillins Shortness Of Breath     Pt said she \"passed out\"    Vancomycin Itching     Pt said she gets a \"redness and swelling\" as well       REVIEW OF SYSTEMS:  Constitutional: Denies fever, weight loss, night sweats, and fatigue  Skin: Denies pigmentation, dark lesions, and rashes   HEENT: Denies hearing loss, tinnitus, ear drainage, epistaxis, sore throat, and hoarseness. Cardiovascular: Denies palpitations, chest pain, and chest pressure. Respiratory: Positive for shortness of breath and orthopnea gastrointestinal: Denies nausea, vomiting, poor appetite, diarrhea, heartburn or reflux  Genitourinary: Denies dysuria, frequency, urgency or hematuria  Musculoskeletal: Denies myalgias, muscle weakness, and bone pain  Neurological: Denies dizziness, vertigo, headache, and focal weakness  Psychological: Denies anxiety and depression  Endocrine: Denies heat intolerance and cold intolerance  Hematopoietic/Lymphatic: Denies bleeding problems and blood transfusions    OBJECTIVE:   /67   Pulse 80   Temp 96.9 °F (36.1 °C) (Infrared)   Resp 20   Ht 5' 8\" (1.727 m)   Wt 143 lb (64.9 kg)   SpO2 92%   BMI 21.74 kg/m²   SpO2 Readings from Last 1 Encounters:   20 92%        I/O:    Intake/Output Summary (Last 24 hours) at 2020 1139  Last data filed at 2020 1103  Gross per 24 hour   Intake 250 ml   Output 1425 ml   Net -1175 ml     Vent Information  SpO2: 92 %                Physical Exam:  General: The patient is lying in bed comfortably without any distress. Breathing is not labored  HEENT: Pupils are equal round and reactive to light, there are no oral lesions and no post-nasal drip   Neck: supple without adenopathy  Cardiovascular: regular rate and rhythm without murmur or gallop  Respiratory: Has few crackles on the right lower lung.     Abdomen: soft, non-tender, non-distended, normal bowel sounds  Extremities: warm, no edema, no clubbing  Skin: no rash or lesion  Neurologic: CN II-XII grossly intact, no focal deficits    Pulmonary Function Testing personally reviewed and interpreted      Imaging personally reviewed:    Narrative   Patient MRN:  [id number]   : 1955   Age: 62 years   Gender: Female   Order Date:  5/20/2020 9:45 PM   EXAM: CTA CHEST W CONTRAST    INDICATION:  rule out PE    rule out PE    COMPARISON: CTA of the chest, 8/27/2012.       Technique: Low-dose CT  acquisition technique included one of   following options; 1 . Automated exposure control, 2. Adjustment of MA   and or KV according to patient's size or 3. Use of iterative   reconstruction.       Contiguous spiral images were obtained in the axial plane, following   the administration of intravenous contrast using CT angiographic   protocol. Sagittal and coronal images were reconstructed from the   axial plane acquisition. Addition MIP reconstructions were presented   to aid in the interpretation of this study.        FINDINGS:       No pulmonary embolism is seen. Probable pulmonary arterial   hypertension, suggested by dilation of the main pulmonary artery   (normal caliber less than 3 cm) to 3.1 cm. The heart is enlarged. No   pericardial effusion is seen. Moderate right and small left pleural   effusions.       Interstitial edema is seen in the lungs likely related to congestive   heart failure. No pulmonary consolidation is seen. No pulmonary nodule   or mass is identified. No pneumothorax is seen. The central airway is   clear. No lymphadenopathy seen in the chest. The upper abdomen   included in the field-of-view of this study demonstrates no acute   abnormality. Cholecystectomy clips are seen. Evaluation of the bones   reveals no fracture or destructive lesion.            Impression       1. No pulmonary embolism. 2. Findings of congestive heart failure (cardiomegaly, interstitial   edema and bilateral pleural effusions). 3. Probable pulmonary arterial hypertension, suggested by dilation of   the main pulmonary artery (normal caliber less than 3 cm) to 3.1 cm.               Echo:  Was done earlier today results are pending.     Labs:  Lab Results   Component Value Date    WBC 5.4 05/21/2020    HGB 13.3

## 2020-05-21 NOTE — ED PROVIDER NOTES
file.     The patients home medications have been reviewed.     Allergies: Penicillins and Vancomycin    -------------------------------------------------- RESULTS -------------------------------------------------  All laboratory and radiology results have been personally reviewed by myself   LABS:  Results for orders placed or performed during the hospital encounter of 05/20/20   CBC Auto Differential   Result Value Ref Range    WBC 5.2 4.5 - 11.5 E9/L    RBC 4.28 3.50 - 5.50 E12/L    Hemoglobin 13.4 11.5 - 15.5 g/dL    Hematocrit 40.4 34.0 - 48.0 %    MCV 94.4 80.0 - 99.9 fL    MCH 31.3 26.0 - 35.0 pg    MCHC 33.2 32.0 - 34.5 %    RDW 13.4 11.5 - 15.0 fL    Platelets 586 960 - 480 E9/L    MPV 8.9 7.0 - 12.0 fL    Neutrophils % 48.7 43.0 - 80.0 %    Immature Granulocytes % 0.4 0.0 - 5.0 %    Lymphocytes % 32.8 20.0 - 42.0 %    Monocytes % 9.3 2.0 - 12.0 %    Eosinophils % 7.6 (H) 0.0 - 6.0 %    Basophils % 1.2 0.0 - 2.0 %    Neutrophils Absolute 2.51 1.80 - 7.30 E9/L    Immature Granulocytes # 0.02 E9/L    Lymphocytes Absolute 1.69 1.50 - 4.00 E9/L    Monocytes Absolute 0.48 0.10 - 0.95 E9/L    Eosinophils Absolute 0.39 0.05 - 0.50 E9/L    Basophils Absolute 0.06 0.00 - 0.20 E9/L   Comprehensive Metabolic Panel   Result Value Ref Range    Sodium 139 132 - 146 mmol/L    Potassium 3.2 (L) 3.5 - 5.0 mmol/L    Chloride 103 98 - 107 mmol/L    CO2 25 22 - 29 mmol/L    Anion Gap 11 7 - 16 mmol/L    Glucose 104 (H) 74 - 99 mg/dL    BUN 22 8 - 23 mg/dL    CREATININE 1.5 (H) 0.5 - 1.0 mg/dL    GFR Non-African American 35 >=60 mL/min/1.73    GFR African American 42     Calcium 9.5 8.6 - 10.2 mg/dL    Total Protein 6.6 6.4 - 8.3 g/dL    Alb 4.2 3.5 - 5.2 g/dL    Total Bilirubin 0.5 0.0 - 1.2 mg/dL    Alkaline Phosphatase 92 35 - 104 U/L    ALT 59 (H) 0 - 32 U/L    AST 58 (H) 0 - 31 U/L   Troponin   Result Value Ref Range    Troponin <0.01 0.00 - 0.03 ng/mL   Brain Natriuretic Peptide   Result Value Ref Range    Pro-BNP 12,686 nitroGLYCERIN (NITROSTAT) SL tablet 0.4 mg (0.4 mg Sublingual Given 5/20/20 2323)   sodium chloride flush 0.9 % injection 10 mL (has no administration in time range)   sodium chloride flush 0.9 % injection 10 mL (has no administration in time range)   acetaminophen (TYLENOL) tablet 650 mg (has no administration in time range)   aspirin tablet 325 mg (325 mg Oral Given 5/20/20 2224)   potassium chloride (KLOR-CON M) extended release tablet 40 mEq (40 mEq Oral Given 5/20/20 2224)   iopamidol (ISOVUE-370) 76 % injection 70 mL (70 mLs Intravenous Given 5/20/20 2245)   furosemide (LASIX) injection 40 mg (40 mg Intravenous Given 5/20/20 2315)   acetaminophen (TYLENOL) tablet 650 mg (650 mg Oral Given 5/20/20 2321)       EKG #1:  Interpreted by emergency department physician unless otherwise noted. Time:  2157    Rate: 97  Rhythm: Sinus. Interpretation: sinus tachycardia, no ST elevation or depression. T wave inversion in V2. Right bundle branch block. Prolonged QTC of 523. Changes from prior. ED Course as of May 20 2337   Wed May 20, 2020   2330 Spoke with Dr. Kenneth Hale regarding admission.     [BM]      ED Course User Index  [BM] Larry Sanchez MD       Medical Decision Making: Kelly Mclaughlin presents for dyspnea. Differential diagnosis includes, but is not limited to, URI, bronchitis, pneumothorax, PNA, CHF, PE, anxiety, pleural edema/effusion, and pericardial effusion. Patient given a full aspirin. Lab work significant for hypokalemia and potassium replacement ordered. Patient has chronic kidney disease that is stable and a very mildly elevated lactate of 2.1. Troponin is negative but BNP is acutely elevated at 12,686 consistent with acute congestive heart failure. Chest x-ray also suggestive of pulmonary vascular congestion and developing interstitial pulmonary edema with a right pleural effusion.   This was followed by CTA of the chest which shows no pulmonary embolism and findings of congestive heart failure with probable pulmonary arterial hypertension with cardiomegaly, and bilateral pleural effusions. Patient tested negative for COVID-19. She was given 2 sublingual nitroglycerin which did ease some of her shortness of breath and improve her vital signs. Patient was also given 40 mg of Lasix for diuresis. Patient will be admitted for ongoing treatment and management of acute/new onset of congestive heart failure. Please note that the withdrawal or failure to initiate urgent interventions for this patient would likely result in a life threatening deterioration or permanent disability. Systems at risk for deterioration include: respiratory     Accordingly this patient received 30 minutes of critical care time, excluding separately billable procedures. Counseling: The emergency provider has spoken with the patient and discussed todays results, in addition to providing specific details for the plan of care and counseling regarding the diagnosis and prognosis. Questions are answered at this time and they are agreeable with the plan.      --------------------------------- IMPRESSION AND DISPOSITION ---------------------------------    IMPRESSION  1. Acute respiratory failure with hypoxia ([address]    2. Dyspnea and respiratory abnormalities    3. Acute congestive heart failure, unspecified heart failure type ([address]    4. Hypokalemia    5. Chronic kidney disease, unspecified CKD stage    6. Bilateral pleural effusion        DISPOSITION  Disposition: Admit to telemetry  Patient condition is fair      NOTE: This report was transcribed using voice recognition software.  Every effort was made to ensure accuracy; however, inadvertent computerized transcription errors may be present        Hans Parson MD  05/20/20 9946

## 2020-05-22 ENCOUNTER — APPOINTMENT (OUTPATIENT)
Dept: GENERAL RADIOLOGY | Age: 65
DRG: 291 | End: 2020-05-22
Payer: COMMERCIAL

## 2020-05-22 VITALS
OXYGEN SATURATION: 95 % | RESPIRATION RATE: 18 BRPM | BODY MASS INDEX: 21.82 KG/M2 | SYSTOLIC BLOOD PRESSURE: 127 MMHG | HEIGHT: 68 IN | WEIGHT: 144 LBS | DIASTOLIC BLOOD PRESSURE: 66 MMHG | HEART RATE: 92 BPM | TEMPERATURE: 96.9 F

## 2020-05-22 LAB
ANION GAP SERPL CALCULATED.3IONS-SCNC: 12 MMOL/L (ref 7–16)
BUN BLDV-MCNC: 31 MG/DL (ref 8–23)
CALCIUM SERPL-MCNC: 9.1 MG/DL (ref 8.6–10.2)
CHLORIDE BLD-SCNC: 101 MMOL/L (ref 98–107)
CO2: 27 MMOL/L (ref 22–29)
CREAT SERPL-MCNC: 1.7 MG/DL (ref 0.5–1)
GFR AFRICAN AMERICAN: 37
GFR NON-AFRICAN AMERICAN: 30 ML/MIN/1.73
GLUCOSE BLD-MCNC: 106 MG/DL (ref 74–99)
MAGNESIUM: 2 MG/DL (ref 1.6–2.6)
POTASSIUM SERPL-SCNC: 3.2 MMOL/L (ref 3.5–5)
SARS-COV-2, NAAT: NOT DETECTED
SODIUM BLD-SCNC: 140 MMOL/L (ref 132–146)

## 2020-05-22 PROCEDURE — 80048 BASIC METABOLIC PNL TOTAL CA: CPT

## 2020-05-22 PROCEDURE — 2580000003 HC RX 258: Performed by: FAMILY MEDICINE

## 2020-05-22 PROCEDURE — 6370000000 HC RX 637 (ALT 250 FOR IP): Performed by: FAMILY MEDICINE

## 2020-05-22 PROCEDURE — 99233 SBSQ HOSP IP/OBS HIGH 50: CPT | Performed by: INTERNAL MEDICINE

## 2020-05-22 PROCEDURE — 36415 COLL VENOUS BLD VENIPUNCTURE: CPT

## 2020-05-22 PROCEDURE — 6360000002 HC RX W HCPCS: Performed by: FAMILY MEDICINE

## 2020-05-22 PROCEDURE — 6370000000 HC RX 637 (ALT 250 FOR IP): Performed by: NURSE PRACTITIONER

## 2020-05-22 PROCEDURE — U0002 COVID-19 LAB TEST NON-CDC: HCPCS

## 2020-05-22 PROCEDURE — 83735 ASSAY OF MAGNESIUM: CPT

## 2020-05-22 PROCEDURE — 71046 X-RAY EXAM CHEST 2 VIEWS: CPT

## 2020-05-22 PROCEDURE — 6360000002 HC RX W HCPCS: Performed by: NURSE PRACTITIONER

## 2020-05-22 RX ORDER — METOPROLOL SUCCINATE 25 MG/1
25 TABLET, EXTENDED RELEASE ORAL DAILY
Qty: 30 TABLET | Refills: 3 | Status: SHIPPED | OUTPATIENT
Start: 2020-05-23

## 2020-05-22 RX ORDER — POTASSIUM CHLORIDE 20 MEQ/1
20 TABLET, EXTENDED RELEASE ORAL ONCE
Status: COMPLETED | OUTPATIENT
Start: 2020-05-22 | End: 2020-05-22

## 2020-05-22 RX ADMIN — METOPROLOL SUCCINATE 25 MG: 25 TABLET, EXTENDED RELEASE ORAL at 08:53

## 2020-05-22 RX ADMIN — HEPARIN SODIUM 5000 UNITS: 10000 INJECTION INTRAVENOUS; SUBCUTANEOUS at 08:53

## 2020-05-22 RX ADMIN — Medication 1 TABLET: at 08:53

## 2020-05-22 RX ADMIN — CHOLECALCIFEROL TAB 50 MCG (2000 UNIT) 2000 UNITS: 50 TAB at 08:53

## 2020-05-22 RX ADMIN — SACUBITRIL AND VALSARTAN 1 TABLET: 24; 26 TABLET, FILM COATED ORAL at 08:54

## 2020-05-22 RX ADMIN — Medication 10 ML: at 08:53

## 2020-05-22 RX ADMIN — POTASSIUM CHLORIDE 20 MEQ: 20 TABLET, EXTENDED RELEASE ORAL at 08:53

## 2020-05-22 RX ADMIN — POTASSIUM CHLORIDE 20 MEQ: 20 TABLET, EXTENDED RELEASE ORAL at 08:52

## 2020-05-22 RX ADMIN — ALLOPURINOL 100 MG: 100 TABLET ORAL at 08:54

## 2020-05-22 RX ADMIN — GABAPENTIN 300 MG: 300 CAPSULE ORAL at 08:53

## 2020-05-22 RX ADMIN — TRIAMCINOLONE ACETONIDE: 1 CREAM TOPICAL at 08:46

## 2020-05-22 RX ADMIN — FUROSEMIDE 40 MG: 10 INJECTION, SOLUTION INTRAMUSCULAR; INTRAVENOUS at 08:52

## 2020-05-22 RX ADMIN — ACYCLOVIR 400 MG: 200 CAPSULE ORAL at 08:53

## 2020-05-22 ASSESSMENT — PAIN SCALES - GENERAL: PAINLEVEL_OUTOF10: 0

## 2020-05-22 NOTE — PROGRESS NOTES
Call placed to Dr. Leann Perez answering service re: blood pressure and possible parameters for night blood pressure medication. Awaiting response. Spoke to Dr. Yanet Wilkerson. See new parameter orders for Entresto.

## 2020-05-22 NOTE — PROGRESS NOTES
Cherry Oden M.D.,[facility]  Micah Carbajal D.O., F.A.C.ONawafI., Lucille Wyman M.D. Leticia Smith M.D., Yoshi Shaikh M.D. Magdaleno Serrato D.O. Daily Pulmonary Progress Note    Patient:  Cody Bachelor 62 y.o. female MRN: [id number]     Date of Service: 5/22/2020      Synopsis     We are following patient for shortness of breath and bilateral pleural effusion    \"CC\" : Redness of breath and gurgling sound in her throat    Code status: Full code      Subjective      Patient was seen and examined. He is feeling better today. On room air. Has no further orthopnea. Review of Systems:  Constitutional: Denies fever, weight loss, night sweats, and fatigue  Skin: Denies pigmentation, dark lesions, and rashes   HEENT: Denies hearing loss, tinnitus, ear drainage, epistaxis, sore throat, and hoarseness. Cardiovascular: Denies palpitations, chest pain, and chest pressure. Respiratory: Denies cough, dyspnea at rest, hemoptysis, apnea, and choking.   Gastrointestinal: Denies nausea, vomiting, poor appetite, diarrhea, heartburn or reflux  Genitourinary: Denies dysuria, frequency, urgency or hematuria  Musculoskeletal: Denies myalgias, muscle weakness, and bone pain  Neurological: Denies dizziness, vertigo, headache, and focal weakness  Psychological: Denies anxiety and depression  Endocrine: Denies heat intolerance and cold intolerance  Hematopoietic/Lymphatic: Denies bleeding problems and blood transfusions    24-hour events:      Objective   Vitals: /66   Pulse 92   Temp 96.9 °F (36.1 °C) (Infrared)   Resp 18   Ht 5' 8\" (1.727 m)   Wt 144 lb (65.3 kg)   SpO2 95%   BMI 21.90 kg/m²     I/O:    Intake/Output Summary (Last 24 hours) at 5/22/2020 1326  Last data filed at 5/22/2020 0836  Gross per 24 hour   Intake --   Output 2150 ml   Net -2150 ml       Vent Information  SpO2: 95 %                CURRENT MEDS :  Scheduled Meds:   acyclovir  400 mg Oral BID    allopurinol  100 mg identified.        The exam has been dictated and signed by Dionne Pimentel. RENEE Del Rosario   and Johnna Yip MD, reviewed and concurred with these findings.                 ECHO        Summary   Severely reduced left ventricular systolic function. Ejection fraction is visually estimated at 15-20%. Normal right ventricular size and function. There is doppler evidence of stage II diastolic dysfunction. Left to right interatrial shunting present by color flow. Moderate mitral regurgitation (secondary MR). Mild aortic regurgitation. Mild tricuspid regurgitation. PASP is estimated at 26 mmHg. Signature      ----------------------------------------------------------------   Electronically signed by Annabella De Santiago MD(Interpreting   physician) on 05/21/2020 04:12 PM   ----------------------------------------------------------------     M-Mode/2D Measurements & Calculations  Labs:  Lab Results   Component Value Date    WBC 5.4 05/21/2020    HGB 13.3 05/21/2020    HCT 39.9 05/21/2020    MCV 91.7 05/21/2020    MCH 30.6 05/21/2020    MCHC 33.3 05/21/2020    RDW 13.2 05/21/2020     05/21/2020    MPV 8.5 05/21/2020     Lab Results   Component Value Date     05/22/2020    K 3.2 05/22/2020    K 3.7 05/21/2020     05/22/2020    CO2 27 05/22/2020    BUN 31 05/22/2020    CREATININE 1.7 05/22/2020    LABALBU 4.2 05/21/2020    LABALBU 4.2 05/05/2020    CALCIUM 9.1 05/22/2020    GFRAA 37 05/22/2020    LABGLOM 30 05/22/2020     Lab Results   Component Value Date    PROTIME 12.4 06/20/2018    INR 1.1 06/20/2018     Recent Labs     05/20/20  2149   PROBNP 12,686*     No results for input(s): PROCAL in the last 72 hours. This SmartLink has not been configured with any valid records. Micro:  No results for input(s): CULTRESP in the last 72 hours. No results for input(s): LABGRAM in the last 72 hours. No results for input(s): LEGUR in the last 72 hours.   No results for input(s): STREPNEUMAGU in the last 72 hours. No results for input(s): LP1UAG in the last 72 hours. Assessment:    1. Acute hypoxic respiratory failure-resolved  2. Acute HfrEF  3. Dilated cardiomyopathy with EF of 15%  4. Lateral pleural effusion secondary to CHF-resolved after diuresis  5. Moderate MR  6. Acute on chronic kidney disease  7. 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 History of right breast CA status post chemotherapy and radiation lumpectomy and also treated with doxorubicin in 2004      Plan:   Patient has bilateral pleural effusions responded well to diuresis. Repeat chest x-ray today shows significant improvement with no residual effusions. Per daughter's request she is going to be transferred to [facility] for ischemic and further evaluation for her heart failure. Continue current regimen including Lasix, Entresto and Toprol. Monitor I's and O's closely. Patient is stable from a pulmonary standpoint for transfer.     Electronically signed by Flaquito Downey MD on 5/22/2020 at 1:26 PM

## 2020-05-22 NOTE — CARE COORDINATION
Per QFR--- EF 15-20% & pt needs cath, daughter requesting transfer to [facility]. PCP & cardiology in agreement. Transfer Center/Access Center contacted. Will need covid test prior to transfer. CM provided test kit to nurse.

## 2020-05-27 ENCOUNTER — TELEPHONE (OUTPATIENT)
Dept: CARDIOLOGY CLINIC | Age: 65
End: 2020-05-27

## 2020-05-27 NOTE — TELEPHONE ENCOUNTER
Pt to be transferred to  ED and receive dialysis there.      Tati Cedeño RN  04/17/17 0516     noted

## 2020-11-04 DIAGNOSIS — N18.31 HYPERTENSIVE KIDNEY DISEASE WITH STAGE 3A CHRONIC KIDNEY DISEASE (HCC): ICD-10-CM

## 2020-11-04 DIAGNOSIS — I12.9 HYPERTENSIVE KIDNEY DISEASE WITH STAGE 3A CHRONIC KIDNEY DISEASE (HCC): ICD-10-CM

## 2020-11-04 LAB
ALBUMIN SERPL-MCNC: 4.2 G/DL (ref 3.5–5.2)
ALP BLD-CCNC: 69 U/L (ref 35–104)
ALT SERPL-CCNC: 19 U/L (ref 0–32)
ANION GAP SERPL CALCULATED.3IONS-SCNC: 14 MMOL/L (ref 7–16)
AST SERPL-CCNC: 28 U/L (ref 0–31)
BILIRUB SERPL-MCNC: 0.6 MG/DL (ref 0–1.2)
BUN BLDV-MCNC: 31 MG/DL (ref 8–23)
CALCIUM SERPL-MCNC: 9.8 MG/DL (ref 8.6–10.2)
CHLORIDE BLD-SCNC: 99 MMOL/L (ref 98–107)
CO2: 29 MMOL/L (ref 22–29)
CREAT SERPL-MCNC: 1.6 MG/DL (ref 0.5–1)
GFR AFRICAN AMERICAN: 39
GFR NON-AFRICAN AMERICAN: 32 ML/MIN/1.73
GLUCOSE BLD-MCNC: 58 MG/DL (ref 74–99)
POTASSIUM SERPL-SCNC: 4.2 MMOL/L (ref 3.5–5)
SODIUM BLD-SCNC: 142 MMOL/L (ref 132–146)
TOTAL PROTEIN: 6.7 G/DL (ref 6.4–8.3)

## 2020-12-14 DIAGNOSIS — I50.21 ACUTE SYSTOLIC CONGESTIVE HEART FAILURE (HCC): ICD-10-CM

## 2020-12-14 LAB
ANION GAP SERPL CALCULATED.3IONS-SCNC: 8 MMOL/L (ref 7–16)
BUN BLDV-MCNC: 27 MG/DL (ref 8–23)
CALCIUM SERPL-MCNC: 10.2 MG/DL (ref 8.6–10.2)
CHLORIDE BLD-SCNC: 103 MMOL/L (ref 98–107)
CO2: 30 MMOL/L (ref 22–29)
CREAT SERPL-MCNC: 1.5 MG/DL (ref 0.5–1)
GFR AFRICAN AMERICAN: 42
GFR NON-AFRICAN AMERICAN: 35 ML/MIN/1.73
GLUCOSE BLD-MCNC: 58 MG/DL (ref 74–99)
POTASSIUM SERPL-SCNC: 4.7 MMOL/L (ref 3.5–5)
SODIUM BLD-SCNC: 141 MMOL/L (ref 132–146)

## 2021-01-22 DIAGNOSIS — Z79.01 CURRENT USE OF LONG TERM ANTICOAGULATION: ICD-10-CM

## 2021-01-26 DIAGNOSIS — Z79.01 CURRENT USE OF LONG TERM ANTICOAGULATION: ICD-10-CM

## 2021-01-26 LAB
INR BLD: 1.3
PROTHROMBIN TIME: 14.4 SEC (ref 9.3–12.4)

## 2021-01-29 ENCOUNTER — HOSPITAL ENCOUNTER (OUTPATIENT)
Age: 66
Discharge: HOME | End: 2021-01-29
Payer: COMMERCIAL

## 2021-01-29 DIAGNOSIS — Z79.01 CURRENT USE OF LONG TERM ANTICOAGULATION: ICD-10-CM

## 2021-01-29 LAB
INR BLD: 1.2
PROTHROMBIN TIME: 14.1 SEC (ref 9.3–12.4)

## 2021-01-29 PROCEDURE — 36415 COLL VENOUS BLD VENIPUNCTURE: CPT

## 2021-01-29 PROCEDURE — 85610 PROTHROMBIN TIME: CPT

## 2021-02-01 DIAGNOSIS — Z79.01 CURRENT USE OF LONG TERM ANTICOAGULATION: ICD-10-CM

## 2021-02-01 LAB
INR BLD: 1.6
PROTHROMBIN TIME: 17.7 SEC (ref 9.3–12.4)

## 2021-02-03 DIAGNOSIS — Z79.01 CURRENT USE OF LONG TERM ANTICOAGULATION: ICD-10-CM

## 2021-02-03 LAB
INR BLD: 1.8
PROTHROMBIN TIME: 20.1 SEC (ref 9.3–12.4)

## 2021-02-08 DIAGNOSIS — Z79.01 CURRENT USE OF LONG TERM ANTICOAGULATION: ICD-10-CM

## 2021-02-08 LAB
INR BLD: 2.1
PROTHROMBIN TIME: 23.7 SEC (ref 9.3–12.4)

## 2021-02-12 DIAGNOSIS — Z79.01 CURRENT USE OF LONG TERM ANTICOAGULATION: ICD-10-CM

## 2021-02-12 LAB
INR BLD: 1.9
PROTHROMBIN TIME: 21.4 SEC (ref 9.3–12.4)

## 2021-02-19 DIAGNOSIS — Z79.01 CURRENT USE OF LONG TERM ANTICOAGULATION: ICD-10-CM

## 2021-02-19 LAB
INR BLD: 1.8
PROTHROMBIN TIME: 20.7 SEC (ref 9.3–12.4)

## 2021-02-26 DIAGNOSIS — Z79.01 CURRENT USE OF LONG TERM ANTICOAGULATION: ICD-10-CM

## 2021-02-26 LAB
INR BLD: 2.1
PROTHROMBIN TIME: 23 SEC (ref 9.3–12.4)

## 2021-03-12 DIAGNOSIS — Z79.01 CURRENT USE OF LONG TERM ANTICOAGULATION: ICD-10-CM

## 2021-03-12 LAB
INR BLD: 2.5
PROTHROMBIN TIME: 27.7 SEC (ref 9.3–12.4)

## 2021-03-26 DIAGNOSIS — Z79.01 CURRENT USE OF LONG TERM ANTICOAGULATION: ICD-10-CM

## 2021-03-26 LAB
INR BLD: 1.7
PROTHROMBIN TIME: 18.6 SEC (ref 9.3–12.4)

## 2021-04-09 DIAGNOSIS — Z79.01 CURRENT USE OF LONG TERM ANTICOAGULATION: ICD-10-CM

## 2021-04-09 LAB
INR BLD: 2.3
PROTHROMBIN TIME: 25.9 SEC (ref 9.3–12.4)

## 2021-04-23 DIAGNOSIS — Z79.01 CURRENT USE OF LONG TERM ANTICOAGULATION: ICD-10-CM

## 2021-04-23 LAB
INR BLD: 3.5
PROTHROMBIN TIME: 37.9 SEC (ref 9.3–12.4)

## 2021-05-12 DIAGNOSIS — Z79.01 CURRENT USE OF LONG TERM ANTICOAGULATION: ICD-10-CM

## 2021-05-12 LAB
INR BLD: 2.4
PROTHROMBIN TIME: 25.6 SEC (ref 9.3–12.4)

## 2021-05-26 DIAGNOSIS — Z79.01 CURRENT USE OF LONG TERM ANTICOAGULATION: ICD-10-CM

## 2021-05-26 LAB
INR BLD: 3.1
PROTHROMBIN TIME: 33.8 SEC (ref 9.3–12.4)

## 2021-06-09 DIAGNOSIS — Z79.01 CURRENT USE OF LONG TERM ANTICOAGULATION: ICD-10-CM

## 2021-06-23 DIAGNOSIS — Z79.01 CURRENT USE OF LONG TERM ANTICOAGULATION: ICD-10-CM

## 2021-06-23 LAB
INR BLD: 3.8
PROTHROMBIN TIME: 40.9 SEC (ref 9.3–12.4)

## 2021-07-07 DIAGNOSIS — Z79.01 CURRENT USE OF LONG TERM ANTICOAGULATION: ICD-10-CM

## 2021-07-07 LAB
INR BLD: 1.9
PROTHROMBIN TIME: 21.3 SEC (ref 9.3–12.4)

## 2021-07-21 DIAGNOSIS — Z79.01 CURRENT USE OF LONG TERM ANTICOAGULATION: ICD-10-CM

## 2021-07-21 LAB
INR BLD: 2.7
PROTHROMBIN TIME: 29.9 SEC (ref 9.3–12.4)

## 2022-01-04 ENCOUNTER — APPOINTMENT (OUTPATIENT)
Dept: GENERAL RADIOLOGY | Age: 67
End: 2022-01-04
Payer: MEDICARE

## 2022-01-04 ENCOUNTER — HOSPITAL ENCOUNTER (OUTPATIENT)
Age: 67
Setting detail: OBSERVATION
Discharge: HOME | End: 2022-01-06
Attending: EMERGENCY MEDICINE | Admitting: INTERNAL MEDICINE
Payer: MEDICARE

## 2022-01-04 DIAGNOSIS — R07.9 CHEST PAIN, UNSPECIFIED TYPE: Primary | ICD-10-CM

## 2022-01-04 PROCEDURE — 71045 X-RAY EXAM CHEST 1 VIEW: CPT

## 2022-01-04 PROCEDURE — 99285 EMERGENCY DEPT VISIT HI MDM: CPT

## 2022-01-05 LAB
ALBUMIN SERPL-MCNC: 4.2 G/DL (ref 3.5–5.2)
ALP BLD-CCNC: 61 U/L (ref 35–104)
ALT SERPL-CCNC: 19 U/L (ref 0–32)
ANION GAP SERPL CALCULATED.3IONS-SCNC: 14 MMOL/L (ref 7–16)
AST SERPL-CCNC: 36 U/L (ref 0–31)
BACTERIA: ABNORMAL /HPF
BASOPHILS ABSOLUTE: 0.02 E9/L (ref 0–0.2)
BASOPHILS RELATIVE PERCENT: 0.3 % (ref 0–2)
BILIRUB SERPL-MCNC: 0.6 MG/DL (ref 0–1.2)
BILIRUBIN URINE: NEGATIVE
BLOOD, URINE: NEGATIVE
BUN BLDV-MCNC: 23 MG/DL (ref 6–23)
CALCIUM SERPL-MCNC: 10.2 MG/DL (ref 8.6–10.2)
CHLORIDE BLD-SCNC: 100 MMOL/L (ref 98–107)
CLARITY: CLEAR
CO2: 26 MMOL/L (ref 22–29)
COLOR: YELLOW
CREAT SERPL-MCNC: 1.3 MG/DL (ref 0.5–1)
D DIMER: <200 NG/ML DDU
EKG ATRIAL RATE: 67 BPM
EKG P AXIS: 44 DEGREES
EKG P-R INTERVAL: 158 MS
EKG Q-T INTERVAL: 442 MS
EKG QRS DURATION: 146 MS
EKG QTC CALCULATION (BAZETT): 467 MS
EKG R AXIS: -60 DEGREES
EKG T AXIS: 98 DEGREES
EKG VENTRICULAR RATE: 67 BPM
EOSINOPHILS ABSOLUTE: 0.29 E9/L (ref 0.05–0.5)
EOSINOPHILS RELATIVE PERCENT: 3.9 % (ref 0–6)
GFR AFRICAN AMERICAN: 50
GFR NON-AFRICAN AMERICAN: 41 ML/MIN/1.73
GLUCOSE BLD-MCNC: 84 MG/DL (ref 74–99)
GLUCOSE URINE: NEGATIVE MG/DL
HCT VFR BLD CALC: 40 % (ref 34–48)
HEMOGLOBIN: 13.2 G/DL (ref 11.5–15.5)
IMMATURE GRANULOCYTES #: 0.02 E9/L
IMMATURE GRANULOCYTES %: 0.3 % (ref 0–5)
INR BLD: 1
KETONES, URINE: ABNORMAL MG/DL
LACTIC ACID: 1.6 MMOL/L (ref 0.5–2.2)
LEUKOCYTE ESTERASE, URINE: ABNORMAL
LIPASE: 35 U/L (ref 13–60)
LYMPHOCYTES ABSOLUTE: 0.64 E9/L (ref 1.5–4)
LYMPHOCYTES RELATIVE PERCENT: 8.6 % (ref 20–42)
MCH RBC QN AUTO: 31.5 PG (ref 26–35)
MCHC RBC AUTO-ENTMCNC: 33 % (ref 32–34.5)
MCV RBC AUTO: 95.5 FL (ref 80–99.9)
MONOCYTES ABSOLUTE: 0.75 E9/L (ref 0.1–0.95)
MONOCYTES RELATIVE PERCENT: 10.1 % (ref 2–12)
NEUTROPHILS ABSOLUTE: 5.71 E9/L (ref 1.8–7.3)
NEUTROPHILS RELATIVE PERCENT: 76.8 % (ref 43–80)
NITRITE, URINE: NEGATIVE
PDW BLD-RTO: 12.4 FL (ref 11.5–15)
PH UA: 6 (ref 5–9)
PLATELET # BLD: 220 E9/L (ref 130–450)
PMV BLD AUTO: 8.5 FL (ref 7–12)
POTASSIUM SERPL-SCNC: 3.9 MMOL/L (ref 3.5–5)
PROTEIN UA: NEGATIVE MG/DL
PROTHROMBIN TIME: 11 SEC (ref 9.3–12.4)
RBC # BLD: 4.19 E12/L (ref 3.5–5.5)
RBC UA: ABNORMAL /HPF (ref 0–2)
SODIUM BLD-SCNC: 140 MMOL/L (ref 132–146)
SPECIFIC GRAVITY UA: 1.02 (ref 1–1.03)
TOTAL PROTEIN: 6.8 G/DL (ref 6.4–8.3)
TROPONIN, HIGH SENSITIVITY: 11 NG/L (ref 0–9)
TROPONIN, HIGH SENSITIVITY: 12 NG/L (ref 0–9)
UROBILINOGEN, URINE: 0.2 E.U./DL
WBC # BLD: 7.4 E9/L (ref 4.5–11.5)
WBC UA: ABNORMAL /HPF (ref 0–5)

## 2022-01-05 PROCEDURE — 6370000000 HC RX 637 (ALT 250 FOR IP): Performed by: EMERGENCY MEDICINE

## 2022-01-05 PROCEDURE — 99214 OFFICE O/P EST MOD 30 MIN: CPT | Performed by: INTERNAL MEDICINE

## 2022-01-05 PROCEDURE — 83690 ASSAY OF LIPASE: CPT

## 2022-01-05 PROCEDURE — 6360000002 HC RX W HCPCS: Performed by: INTERNAL MEDICINE

## 2022-01-05 PROCEDURE — G0378 HOSPITAL OBSERVATION PER HR: HCPCS

## 2022-01-05 PROCEDURE — 96372 THER/PROPH/DIAG INJ SC/IM: CPT

## 2022-01-05 PROCEDURE — 93005 ELECTROCARDIOGRAM TRACING: CPT | Performed by: EMERGENCY MEDICINE

## 2022-01-05 PROCEDURE — 6370000000 HC RX 637 (ALT 250 FOR IP): Performed by: INTERNAL MEDICINE

## 2022-01-05 PROCEDURE — 6370000000 HC RX 637 (ALT 250 FOR IP): Performed by: NURSE PRACTITIONER

## 2022-01-05 PROCEDURE — 36415 COLL VENOUS BLD VENIPUNCTURE: CPT

## 2022-01-05 PROCEDURE — 80053 COMPREHEN METABOLIC PANEL: CPT

## 2022-01-05 PROCEDURE — 85025 COMPLETE CBC W/AUTO DIFF WBC: CPT

## 2022-01-05 PROCEDURE — 83605 ASSAY OF LACTIC ACID: CPT

## 2022-01-05 PROCEDURE — 84484 ASSAY OF TROPONIN QUANT: CPT

## 2022-01-05 PROCEDURE — APPSS180 APP SPLIT SHARED TIME > 60 MINUTES: Performed by: NURSE PRACTITIONER

## 2022-01-05 PROCEDURE — 85610 PROTHROMBIN TIME: CPT

## 2022-01-05 PROCEDURE — 85378 FIBRIN DEGRADE SEMIQUANT: CPT

## 2022-01-05 PROCEDURE — 81001 URINALYSIS AUTO W/SCOPE: CPT

## 2022-01-05 RX ORDER — GABAPENTIN 400 MG/1
400 CAPSULE ORAL NIGHTLY
Status: DISCONTINUED | OUTPATIENT
Start: 2022-01-05 | End: 2022-01-06 | Stop reason: HOSPADM

## 2022-01-05 RX ORDER — ASPIRIN 81 MG/1
324 TABLET, CHEWABLE ORAL ONCE
Status: COMPLETED | OUTPATIENT
Start: 2022-01-05 | End: 2022-01-05

## 2022-01-05 RX ORDER — METOPROLOL SUCCINATE 25 MG/1
25 TABLET, EXTENDED RELEASE ORAL DAILY
Status: DISCONTINUED | OUTPATIENT
Start: 2022-01-05 | End: 2022-01-06 | Stop reason: HOSPADM

## 2022-01-05 RX ORDER — SPIRONOLACTONE 25 MG/1
12.5 TABLET ORAL DAILY
Status: DISCONTINUED | OUTPATIENT
Start: 2022-01-05 | End: 2022-01-06 | Stop reason: HOSPADM

## 2022-01-05 RX ORDER — GABAPENTIN 300 MG/1
300 CAPSULE ORAL DAILY
Status: DISCONTINUED | OUTPATIENT
Start: 2022-01-05 | End: 2022-01-06 | Stop reason: HOSPADM

## 2022-01-05 RX ADMIN — ASPIRIN 324 MG: 81 TABLET, CHEWABLE ORAL at 05:42

## 2022-01-05 RX ADMIN — ENOXAPARIN SODIUM 60 MG: 100 INJECTION SUBCUTANEOUS at 23:23

## 2022-01-05 RX ADMIN — SACUBITRIL AND VALSARTAN 1 TABLET: 24; 26 TABLET, FILM COATED ORAL at 23:23

## 2022-01-05 RX ADMIN — GABAPENTIN 400 MG: 400 CAPSULE ORAL at 20:51

## 2022-01-05 RX ADMIN — METOPROLOL SUCCINATE 25 MG: 25 TABLET, EXTENDED RELEASE ORAL at 12:25

## 2022-01-05 RX ADMIN — SPIRONOLACTONE 12.5 MG: 25 TABLET ORAL at 17:07

## 2022-01-05 RX ADMIN — SACUBITRIL AND VALSARTAN 1 TABLET: 24; 26 TABLET, FILM COATED ORAL at 13:28

## 2022-01-05 RX ADMIN — GABAPENTIN 300 MG: 300 CAPSULE ORAL at 12:25

## 2022-01-05 RX ADMIN — ENOXAPARIN SODIUM 60 MG: 100 INJECTION SUBCUTANEOUS at 12:28

## 2022-01-05 ASSESSMENT — PAIN SCALES - GENERAL
PAINLEVEL_OUTOF10: 0
PAINLEVEL_OUTOF10: 0

## 2022-01-05 NOTE — ED PROVIDER NOTES
HPI:  1/4/22, Time: 11:29 PM RODRIGO Broussard is a 73 y.o. female presenting to the ED for chest pain, beginning hours ago. The complaint has been intermittent, moderate in severity, and worsened by nothing. Patient reporting chest pain is midsternal patient reporting pain last the several times for 15 minutes. Patient reporting active pain right now. Patient reported no abdominal pain or vomiting there is no leg pain she reports no black or tarry stools she reports no productive cough. Patient reporting no weakness or dizziness.  reports patient has a history of leukemia. Patient currently not receiving any treatment. She reportedly per his report developed cardiomyopathy secondary to treatment. Patient had low ejection fraction of 5% at 1 point currently on Entresto. Patient is not on Coumadin per . ROS:   Pertinent positives and negatives are stated within HPI, all other systems reviewed and are negative.  --------------------------------------------- PAST HISTORY ---------------------------------------------  Past Medical History:  has a past medical history of Acute leukemia ([facility] Breast cancer ([facility] [facility] Cancer ([facility] History of blood transfusion, and Hypertension. Past Surgical History:  has a past surgical history that includes Tubal ligation; Breast lumpectomy; Ankle surgery (Left); other surgical history (Left, 11/29/2016); Breast lumpectomy (Right); Dilation and curettage of uterus; and Cholecystectomy, laparoscopic (N/A, 12/6/2018). Social History:  reports that she has never smoked. She has never used smokeless tobacco. She reports current alcohol use. She reports that she does not use drugs. Family History: family history is not on file. The patients home medications have been reviewed.     Allergies: Penicillins and Vancomycin    ---------------------------------------------------PHYSICAL EXAM--------------------------------------    Constitutional/General: Alert and oriented x3, well appearing, non toxic in NAD  Head: Normocephalic and atraumatic  Eyes: PERRL, EOMI  Mouth: Oropharynx clear, handling secretions, no trismus  Neck: Supple, full ROM, non tender to palpation in the midline, no stridor, no crepitus, no meningeal signs  Pulmonary: Lungs clear to auscultation bilaterally, no wheezes, rales, or rhonchi. Not in respiratory distress  Cardiovascular:  Regular rate. Regular rhythm. No murmurs, gallops, or rubs. 2+ distal pulses  Chest: no chest wall tenderness  Abdomen: Soft. Non tender. Non distended. +BS. No rebound, guarding, or rigidity. No pulsatile masses appreciated. Musculoskeletal: Moves all extremities x 4. Warm and well perfused, no clubbing, cyanosis, or edema. Capillary refill <3 seconds  Skin: warm and dry. No rashes. Neurologic: GCS 15, CN 2-12 grossly intact, no focal deficits, symmetric strength 5/5 in the upper and lower extremities bilaterally  Psych: Normal Affect    -------------------------------------------------- RESULTS -------------------------------------------------  I have personally reviewed all laboratory and imaging results for this patient. Results are listed below.      LABS:  Results for orders placed or performed during the hospital encounter of 01/04/22   CBC auto differential   Result Value Ref Range    WBC 7.4 4.5 - 11.5 E9/L    RBC 4.19 3.50 - 5.50 E12/L    Hemoglobin 13.2 11.5 - 15.5 g/dL    Hematocrit 40.0 34.0 - 48.0 %    MCV 95.5 80.0 - 99.9 fL    MCH 31.5 26.0 - 35.0 pg    MCHC 33.0 32.0 - 34.5 %    RDW 12.4 11.5 - 15.0 fL    Platelets 758 601 - 854 E9/L    MPV 8.5 7.0 - 12.0 fL    Neutrophils % 76.8 43.0 - 80.0 %    Immature Granulocytes % 0.3 0.0 - 5.0 %    Lymphocytes % 8.6 (L) 20.0 - 42.0 %    Monocytes % 10.1 2.0 - 12.0 %    Eosinophils % 3.9 0.0 - 6.0 %    Basophils % 0.3 0.0 - 2.0 %    Neutrophils Absolute 5.71 1.80 - 7.30 E9/L    Immature Granulocytes # 0.02 E9/L    Lymphocytes Absolute 0.64 (L) 1.50 - 4.00 E9/L    Monocytes Absolute 0.75 0.10 - 0.95 E9/L    Eosinophils Absolute 0.29 0.05 - 0.50 E9/L    Basophils Absolute 0.02 0.00 - 0.20 E9/L   Comprehensive Metabolic Panel   Result Value Ref Range    Sodium 140 132 - 146 mmol/L    Potassium 3.9 3.5 - 5.0 mmol/L    Chloride 100 98 - 107 mmol/L    CO2 26 22 - 29 mmol/L    Anion Gap 14 7 - 16 mmol/L    Glucose 84 74 - 99 mg/dL    BUN 23 6 - 23 mg/dL    CREATININE 1.3 (H) 0.5 - 1.0 mg/dL    GFR Non-African American 41 >=60 mL/min/1.73    GFR African American 50     Calcium 10.2 8.6 - 10.2 mg/dL    Total Protein 6.8 6.4 - 8.3 g/dL    Albumin 4.2 3.5 - 5.2 g/dL    Total Bilirubin 0.6 0.0 - 1.2 mg/dL    Alkaline Phosphatase 61 35 - 104 U/L    ALT 19 0 - 32 U/L    AST 36 (H) 0 - 31 U/L   Troponin   Result Value Ref Range    Troponin, High Sensitivity 12 (H) 0 - 9 ng/L   Lipase   Result Value Ref Range    Lipase 35 13 - 60 U/L   Lactic Acid, Plasma   Result Value Ref Range    Lactic Acid 1.6 0.5 - 2.2 mmol/L   Urinalysis   Result Value Ref Range    Color, UA Yellow Straw/Yellow    Clarity, UA Clear Clear    Glucose, Ur Negative Negative mg/dL    Bilirubin Urine Negative Negative    Ketones, Urine TRACE (A) Negative mg/dL    Specific Gravity, UA 1.020 1.005 - 1.030    Blood, Urine Negative Negative    pH, UA 6.0 5.0 - 9.0    Protein, UA Negative Negative mg/dL    Urobilinogen, Urine 0.2 <2.0 E.U./dL    Nitrite, Urine Negative Negative    Leukocyte Esterase, Urine TRACE (A) Negative   Protime-INR   Result Value Ref Range    Protime 11.0 9.3 - 12.4 sec    INR 1.0    D-Dimer, Quantitative   Result Value Ref Range    D-Dimer, Quant <200 ng/mL DDU       RADIOLOGY:  Interpreted by Radiologist.  XR CHEST PORTABLE   Final Result   No acute process. EKG:  This EKG is signed and interpreted by me.     Rate: 67  Rhythm: Sinus  Interpretation: non-specific EKG  Comparison: stable as compared to patient's most recent EKG        ------------------------- NURSING NOTES AND VITALS REVIEWED ---------------------------   The nursing notes within the ED encounter and vital signs as below have been reviewed by myself. /70   Pulse 74   Temp 98.1 °F (36.7 °C)   Resp 16   Ht 5' 8\" (1.727 m)   Wt 144 lb (65.3 kg)   SpO2 95%   BMI 21.90 kg/m²   Oxygen Saturation Interpretation: Normal    The patients available past medical records and past encounters were reviewed. ------------------------------ ED COURSE/MEDICAL DECISION MAKING----------------------  Medications - No data to display          Medical Decision Making:    Patient presenting here because of pains in her chest midsternal nonradiating. Patient reporting no back pain. She reports no shortness of breath. She reports no diaphoresis. Patient does have prior history of heart failure. Patient also has prior history of cancer. Patient's labs and EKG noted reviewed. Patient's D-dimer is less than 200. Patient having no active pain at present. Patient will be admitted    Re-Evaluations:             Re-evaluation. Patients symptoms show no change    Patient reevaluated having no active pain. Patient made aware of findings and plan  Consultations:               Internal medicine  Critical Care: This patient's ED course included: a personal history and physicial eaxmination    This patient has been closely monitored during their ED course. Counseling: The emergency provider has spoken with the patient and discussed todays results, in addition to providing specific details for the plan of care and counseling regarding the diagnosis and prognosis. Questions are answered at this time and they are agreeable with the plan.       --------------------------------- IMPRESSION AND DISPOSITION ---------------------------------    IMPRESSION  1.  Chest pain, unspecified type        DISPOSITION  Disposition: Admit to telemetry  Patient condition is stable        NOTE: This report was transcribed using voice recognition software.  Every effort was made to ensure accuracy; however, inadvertent computerized transcription errors may be present          Susanna Beebe MD  01/05/22 MD Faby  01/05/22 [id number]

## 2022-01-05 NOTE — CONSULTS
I independently interviewed and examined the patient. I have reviewed the above documentation completed by the GUILLERMINA. Please see my additional contributions to the HPI, physical exam, and assessment / medical decision making. Reason for consult: Chest pain    She was not previously known to [facility]) cardiology, follows at [facility] for her cardiology needs. Mrs. 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 Yesterday, around 1030 she developed severe pain (unable to characterize) 8 out of 10 starting from epigastric region all the way into her neck and also localized over the midsternal area pain lasted about 10 minutes. She had 3 such episodes within 1 hour. She did not notice any radiation of pain. She also denied any associated symptoms such as dyspnea sweating nausea. She denied any aggravating relieving factors. She had a third episode when the EMS service arrived and subsequently transported to the emergency room. She was given aspirin while she was in ER. She denies any further episodes of chest pain since then. Work-up in the emergency room, EKG showed normal sinus rhythm, left anterior fascicular block, LVH with repolarization changes, abnormal EKG. Lab work showed creatinine 1.3 BUN 23 protest 3.9 troponin XII AST 36 urinalysis normal D-dimer was negative.   Blood pressure 123/78 sinus bradycardia with heart rate in the 50s and 60s. O2 sats 100% room air. She was also initiated on guideline directed medical therapy at [facility] including Entresto, Toprol-XL and spironolactone. Review of Systems:  Cardiac: As per HPI  General: No fever, chills  Pulmonary: As per HPI  GI: No nausea, vomiting  Musculoskeletal: MENENDEZ x 4, no focal motor deficits  Skin: Intact, no rashes  Neuro/Psych: No headache or seizures    Physical Exam:  /76   Pulse 62   Temp 98.4 °F (36.9 °C)   Resp 16   Ht 5' 8\" (1.727 m)   Wt 144 lb (65.3 kg)   SpO2 97%   BMI 21.90 kg/m²   Appearance: Awake, alert, no acute respiratory distress  Skin: Intact, no rash  Head: Normocephalic, atraumatic  ENMT: MMM, no rhinorrhea  Neck: Supple, no carotid bruits  Lungs: Clear to auscultation bilaterally. No wheezes, rales, or rhonchi. Cardiac: Regular rate and rhythm, +S1S2, no murmurs apparent  Abdomen: Soft, +bowel sounds  Extremities: Moves all extremities x 4, no lower extremity edema  Neurologic: No focal motor deficits apparent, normal mood and affect    Telemetry findings reviewed: SR at rate 60s, no new tachy/bradyarrhythmias overnight    EKG: Normal sinus rhythm, left anterior fascicular block, LVH with repolarization changes, abnormal EKG. Vitals and labs were reviewed: Blood pressure 120/76 heart rate 62 O2 sats 97% on room air    Labs-BUNs/creatinine 23/1.3 rest of the chemistries normal.  Troponin, high-sensitivity 12>> 11, AST/ALT 36/19 CBC normal.  D-dimer less than 200, urinalysis trace amount of ketones trace amount of leukocyte esterase WBC 5-10      TTE-5/21/2020:  Severely reduced left ventricular systolic function. Ejection fraction is visually estimated at 15-20%. Normal right ventricular size and function. There is doppler evidence of stage II diastolic dysfunction. Left to right interatrial shunting present by color flow. Moderate mitral regurgitation (secondary MR).    Mild aortic regurgitation. Mild tricuspid regurgitation. PASP is estimated at 26 mmHg.    /28/2021 TTE  [facility]: LV severely dilated. EF = 25 ± 5% (visual est.) Definity contrast used for endocardial border detection. RV normal in size. Right ventricular systolic function is normal. LA severely dilated. Probable left to right shunting across the interatrial septum better demonstrated on prior DAVID. Exam was compared with the prior  echocardiographic exam performed on 1/19/2021. On direct side by side comparison there is reduction in the degree of mitral regurgitation and minimal improvement in LVEF. No definite echocardiographic evidence of LV thrombus as suspected on prior echo. 7/28/2021 per HF Cardiologist at [facility]-->\"Continue Entresto 24/26 mg twice daily. Continue Toprol XL 25 mg daily.  Heart rate 52 today.  At max tolerated dose.  Continue spironolactone 12.5 mg daily Functional status has improved significantly since Entresto initiation and repeat echocardiogram shows ongoing mold-moderate mitral regurgitation. Discussed with Dr. Al Oden and agree with holding off on MitraClip at this time and reevaluating intervention if symptoms worsen and/or mitral regurgitation worsens in the future. LVEF remains less than 35% after >3 months     11/18/2020 [facility]: Normal appearing coronaries with mild luminal irregularities in LAD and RCA. Right dominant system. LVEDP 6mmHg. 10/14/2020 Cardiac MRI: The left ventricle is dilated (EDVi = 148 cc/m2 ), and has   significantly reduced function (LVEF = 27 % ). No discrete myocardial fibrosis on delayed enhancement imaging to suggest an infiltrative process or prior myocardial/ischemic injury. No evidence of LV thrombus. The right ventricle is dilated (EDVi = 101 cc/m2 ), and has severely reduced function (RVEF = 29 % ). There is malcoaptation of the mitral leaflets with severe central MR (RF 40%). The tricuspid valve exhibits mild to moderate TR visually.  There is mild AI.    5/27/2020 NM PET/CT Cardiac Perf/Stress: PET Perfusion Study: Normal Perfusion but abnormal EF. No evidence of ischemia. No evidence of scarred myocardium. Normal myocardial flow reserve (MFR) globally and in each coronary territory. Functional capacity N/A (pharmacological). Left ventricle is moderately dilated. The left ventricle systolic function is severely decreased. Right ventricle is normal in size. The right ventricle systolic function is normal.  This is a high risk scan due to low EF. Incidental Findings from limited non-diagnostic CTAC:  No distinct coronary calcifications. Assessment:  · Chest pain, probably cardiac however Recent in November 2020 showed normal coronaries. · Nonischemic cardiomyopathy secondary to anthracycline toxicity  · Chronic HFrEF  · ACC/AHA stage C.,  NYHA functional class I-II  · History of AML s/p stem cell transplant and idarubicin  · History of breast cancer s/p lumpectomy followed by chemo with doxorubicin and radiation  · CKD stage III  · History of PFO with a left to right shunt  · Cognitive impairment      Plan:  · Keep n.p.o. after midnight and schedule for a Lexiscan nuclear stress test rule out ischemia. · Continue guideline directed medical therapy for HFrEF including spironolactone 12.5 mg p.o. daily, Entresto 24/26 mg p.o. twice daily and Toprol-XL 25 mg p.o. daily. · Continue rest of current medications per primary service  · She is stable from the cardiology standpoint  · If the stress test comes back negative then she is stable for discharge from cardiology standpoint. Thank you for consulting and allowing us to participate in Mrs. Roblero' care. Anthony Jacobo MD, Alexsander Miller.   [facility]) Cardiology

## 2022-01-05 NOTE — CARE COORDINATION
1/5 Transition of care planning. Patient was admit from ED on 1/4 for CP. No further orders in chart from admitting physician at this time. Patient A&Ox4 with some difficulty recalling information. VSS and on RA. Met with patient at bedside. Patient's PCP is Dr. Sherif Luque and pharmacy is [facility] on [address] in [facility]. Patient lives with her  in a 2 story home with 3 [address] DANIEL. Patient does not use any DME or have a history of HHC/GENTRY. Plan is for patient to return home on discharge when medically stable and her , Nikole Pinon will provide transportation.     Elva Marley, DECLAN, BSN  PHYSICIANS [facility] SURGICAL [facility] Case Management   Cell: [phone]

## 2022-01-05 NOTE — ED NOTES
Patient to the ED for chest pain. Patient states no active chest pain while in the ED. Unknown time of chest patient other than \"It was today, I didn't keep an eye on the clock\".  Unknown duration of time for chest pain      Delores Saini RN  01/05/22 [id number]

## 2022-01-05 NOTE — H&P
Melissa Walls is a 73 y.o. female seen in ER earlier presenting  for chest pain, beginning hours ago. Patient reporting chest pain is midsternal patient reporting had 3 episodes  lasted  for 15 minutes each . mo pain when seen . Patient reported no abdominal pain or vomiting there is no leg pain she reports no black or tarry stools she reports no productive cough. Patient reporting no weakness or dizziness.  reports patient has a history of leukemia. Patient currently not receiving any treatment. She reportedly per his report developed cardiomyopathy secondary to treatment. Patient had low ejection fraction  at 1 point currently on Entresto. initial troponin neg. EKG no acute change  Admitted observation to monitor     Past Medical History:   Diagnosis Date    Acute leukemia ([address]     Breast cancer ([address]     right    Cancer ([address] 2004    right breast    History of blood transfusion     Hypertension        Past Surgical History:   Procedure Laterality Date    ANKLE SURGERY Left     ORIF    BREAST LUMPECTOMY      right side 2004. also had chemo & radiation    BREAST LUMPECTOMY Right     CHOLECYSTECTOMY, LAPAROSCOPIC N/A 12/6/2018    CHOLECYSTECTOMY LAPAROSCOPIC performed by Lucero Stewart MD at [address] [place] [address]      OTHER SURGICAL HISTORY Left 11/29/2016    left hand middle finger ganglion removal    TUBAL LIGATION         History reviewed. No pertinent family history. Prior to Admission medications    Medication Sig Start Date End Date Taking?  Authorizing Provider   warfarin (COUMADIN) 4 MG tablet Take 2 tablets by mouth daily 6/18/21   Naya Michele, DO   warfarin (COUMADIN) 1 MG tablet Take 1 tablet by mouth daily 5/5/21   Naya Michele, DO   enoxaparin (LOVENOX) 60 MG/0.6ML injection Inject 0.6 mLs into the skin every 12 hours 2/3/21   Naya Michele, DO   metoprolol succinate (TOPROL XL) 25 MG extended release tablet Take 1 tablet by mouth daily 5/23/20 Naya Michele DO   sacubitril-valsartan (ENTRESTO) 24-26 MG per tablet Take 1 tablet by mouth 2 times daily 5/22/20   Naya Michele DO   triamcinolone (KENALOG) 0.1 % cream Apply topically 2/3/18   Historical Provider, MD   acyclovir (ZOVIRAX) 200 MG capsule Take 400 mg by mouth 2 times daily  6/3/18   Historical Provider, MD   Cholecalciferol 2000 units CAPS Take 1 capsule by mouth daily 2/1/18   Historical Provider, MD   gabapentin (NEURONTIN) 100 MG capsule Take 300 mg by mouth See Admin Instructions. 300 MG QAM  MG QHS 6/7/18   Historical Provider, MD   therapeutic multivitamin-minerals (THERAGRAN-M) tablet Take 1 tablet by mouth daily. Historical Provider, MD        Allergies: Penicillins and Vancomycin    Social History     Tobacco Use    Smoking status: Never Smoker    Smokeless tobacco: Never Used   Substance Use Topics    Alcohol use: Yes     Comment: occas        Review of Systems:  Respiratory: negative for cough and hemoptysis  Cardiovascular: as per present illness   Gastrointestinal: negative for abdominal pain, diarrhea, nausea and vomiting  Genitourinary:negative for dysuria and hematuria  Derm: negative for rash and skin lesion(s)  Neurological: negative for seizures and tremors  Endocrine: negative for diabetic symptoms including polydipsia and polyuria    Physical Exam:  Vitals:    01/05/22 0855   BP: 138/76   Pulse: 58   Resp: 18   Temp: 97.5 °F (36.4 °C)   SpO2: 100%      Skin:  Warm and dry.   No rash or bruises  HEENT:  PERRLA, EOMI  Neck:  No JVD, No thyromegaly, No carotid bruit  Cardiac:  RRR, No gallop or murmur  Lungs:  CTA, Normal percussion  Abdomen: Normal bowel sounds, no HSM, non-tender  Extremities:  No clubbing, edema or cyanosis  Neurological:  Moves all extremities    Labs:    CBC with Differential:    Lab Results   Component Value Date    WBC 7.4 01/05/2022    RBC 4.19 01/05/2022    HGB 13.2 01/05/2022    HCT 40.0 01/05/2022     01/05/2022    MCV 95.5 01/05/2022    MCH 31.5 01/05/2022    MCHC 33.0 01/05/2022    RDW 12.4 01/05/2022    BLASTSPCT 0.9 10/06/2017    METASPCT 11.4 10/06/2017    LYMPHOPCT 8.6 01/05/2022    MONOPCT 10.1 01/05/2022    BASOPCT 0.3 01/05/2022    MONOSABS 0.75 01/05/2022    LYMPHSABS 0.64 01/05/2022    EOSABS 0.29 01/05/2022    BASOSABS 0.02 01/05/2022     CMP:    Lab Results   Component Value Date     01/05/2022    K 3.9 01/05/2022    K 3.7 05/21/2020     01/05/2022    CO2 26 01/05/2022    BUN 23 01/05/2022    CREATININE 1.3 01/05/2022    GFRAA 50 01/05/2022    LABGLOM 41 01/05/2022    GLUCOSE 84 01/05/2022    GLUCOSE 78 12/29/2020    PROT 6.8 01/05/2022    LABALBU 4.2 01/05/2022    LABALBU 4.1 12/29/2020    CALCIUM 10.2 01/05/2022    BILITOT 0.6 01/05/2022    ALKPHOS 61 01/05/2022    AST 36 01/05/2022    ALT 19 01/05/2022          Assessment and Plan:    Patient Active Problem List   Diagnosis    Chest pain atypical     Hx of cardiomyopathy 2nd to Chemo RX     Hx breast CA    Hx of leukemia     Acute pancreatitis    Gurgling breath sounds

## 2022-01-05 NOTE — CONSULTS
Inpatient Cardiology Consultation      Reason for Consult:  Chest Pain    Consulting Physician: Dr Alexsander Craig    Requesting Physician:  Dr Do Ho    Date of Consultation: 1/5/2022    HISTORY OF PRESENT ILLNESS: 72 yo  female known to [facility] HF cardiology last seen 7/28/2021 Dr Cristobal Officer. 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 Around [id number] middle of chest down into upper stomach rated an 8 out of 10, complained of lower back pain. CP lasted around 5 minutes then came back 10 minutes later and went away after a few minutes then returned again when EMS arrived at house which resolved prior to being transported. No further CP since arrival. Got ASA in ED     [facility] 1/4/2021 for CP. BP upon arrival /78 HR 70's and SR, O2 saturation 94% on RA. CXR read as no acute process    Na 140, K+ 3.9, Bun/Cr 23/1.3, lactic acid 1.6, troponin 12, AST 36, CBC WNL, INR 1.0, UA trace LE's, D-dimer <200. Currently /78 HR 50-60's SB-SR, afebrile, o2 saturation 100% on RA. Please note: past medical records were reviewed per electronic medical record (EMR) - see detailed reports under Past Medical/ Surgical History. Past Medical History:    1. Lifelong non smoker  2.  CKD Stage III  3. 2004 R breast carcinoma s/p XRT/Chemotheray (doxorubicin), and lumpectomy with lymph nodes followed by tamoxifen x2 years and Aromasin until 2009.  4. 2012 Non-ischemic stress  5. 2017 Diagnosed with AML s/p idarubicin and stem cell transplant in 11/30/2017 at [facility]  6. 1/2018 TTE: EF 50-55%. DD. Mild to moderately dilated LA. Mild MR. trival pericardial effusion  7. RBBB/LAFB  8. 5/21/2020 TTE severely reduced LV systolic fx, EF of 17-60%, normal RV size and fx. Doppler evidence of stage II diastolic dysfunction, left to right interatrial shunting, moderate MR, mild AR, mild TR, PASP estimated at 26 mmHg. 9. Admission [facility] 5/22/2020-5/27/2020 for HFrEF. Initial plan for LHC but due to low suspicion for ischemic CM due to history of anthracycline chemotherapy the plan was made for NM stress test. NM test showed no evidence of ischemia or scar. Life vest ordered prior to admission to [facility] which will be set up as outpatient. discharged on goal directed therapy of metoprolol succinate 12.5 mg daily, lisinopril 2.5. Medication doses limited due to hypotension but can be up titrated as outpatient. No indication for ASA and statin given low ASCVD risk score and no evidence of CAD on stress test.   10. 5/22/2020 TTE [facility]: left ventricle is severely dilated. Left ventricular systolic function is severely decreased. EF = 18 ± 5% (2D biplane) Grade II left ventricular diastolic dysfunction. The right ventricle is normal in size. Right ventricular systolic function is mildly decreased. The left atrial cavity is severely dilated. There is moderate (2+ - 3+) holosystolic mitral valve regurgitation due to apical tethering of normal mitral leaflet caused by LV enlargement. Regurgitant orifice area (PISA) is 0.18 cm². There is a patent foramen ovale as detected by Doppler. PFO primarily with left-to-right flow   11. 5/27/2020 NM PET/CT Cardiac Perf/Stress: PET Perfusion Study: Normal Perfusion but abnormal EF. No evidence of ischemia. No evidence of scarred myocardium. Normal myocardial flow reserve (MFR) globally and in each coronary territory. Functional capacity N/A (pharmacological). Left ventricle is moderately dilated. The left ventricle systolic function is severely decreased.  Right ventricle is normal in size. The right ventricle systolic function is normal.  This is a high risk scan due to low EF. Incidental Findings from limited non-diagnostic CTAC:  No distinct coronary calcifications. 12. 6/3/2020 Outpatient [facility]-->\"see back in my clinic on September 9th with same day echo which will be >3 months after diagnosis. Likely limited for MRA use given soft BP trends. Patient euvolemic today and compensated which is reassuring. Will assess for NYHA class and EF in September, continue to wear LifeVest in interim. If NYHA II+ and EF <35% after 3 mos on GDMT, will consider ICD. Has underlying RBBB/LAFB. - If QRS >150 ms and NYHA III (IIa) or NYHA II, QRS >150 (IIb), may consider CRT. (last  ms on most recent ECG). \"  13. 9/23/2020 TTE [facility]: EF 20% +/- 5%. Grade III DDD. Prominent trabeculations present RV normal size. . Right ventricular systolic function is low normal.  LA cavity is severely dilated.  Moderately severe (3+) holosystolic mitral valve regurgitation due to apical tethering of normal mitral leaflet caused by LV enlargement. Regurgitant orifice area (PISA) is 0.24 cm².  1+ TR.   Estimated right ventricular systolic pressure is 49 mmHg consistent with mild  pulmonary hypertension. Estimated right atrial pressure is 3 mmHg based on IVC  assessment.  PFO as detected by Doppler and saline contrast with  valsalva.  Agitated saline test (clip #155)was negative for a right to left shunt across  the interatrial septum but there is a left to right shunt detected by color doppler (clips #[id number], [id number]) - as on the prior study.  Exam was compared with the prior CC echocardiographic exam performed on  05/25/2020. On direct comparision, MR appears to be slightly worse, otherwise similar findings.    14. 9/23/2020 Cardiology outpatient [facility]-->Increase Lisinopril to 10mg   Continue Toprol XL 12.5mg. Continue PRN lasix, daily weights, and BP log.  Ordering cMRI to investigate cardiomyopathy further and evaluate her MR. Myocarditis vs fixed scar from cardiotoxicity on ddx. May consider possible mitraclip as therapy for MR if deemed indicated and a candidate. Consult EP for ICD/?CRT. Consult Heart failure colleagues for additional insight and management for consideration of potential advanced therapies if/as needed  15. 10/14/2020 Cardiac MRI: The left ventricle is dilated (EDVi = 148 cc/m2 ), and has significantly reduced function (LVEF = 27 % ). No discrete myocardial fibrosis on delayed enhancement imaging to suggest an infiltrative process or prior myocardial/ischemic injury. No evidence of LV thrombus. The right ventricle is dilated (EDVi = 101 cc/m2 ), and has severely reduced function (RVEF = 29 % ). There is malcoaptation of the mitral leaflets with severe central MR (RF 40%). The tricuspid valve exhibits mild to moderate TR visually. There is mild AI. 16. 10/29/2020 Started on Entresto 24/26  17. 11/17/2020 DAVID [facility]: left ventricle is dilated. Left ventricular systolic function is severely   decreased. EF = 25 ± 5% (visual est.). The right ventricle is normal in size. Right ventricular systolic function is mildly decreased. The left atrial cavity is dilated. Prominent PFO tunnel with left-to-right shunting with color Doppler (and no right to left shunting by agitated saline). There is moderately severe (3+) mitral valve regurgitation due to apical tethering of normal mitral leaflet caused by LV enlargement. There are 2 jets of MR - a more prominent medial jet with reduced leaflet coaptation at this area and another smaller jet laterally. There is late systolic pulmonary venous flow reversal in the RUPV. EROA by PISA likely underestimated (3D VCA of medial jet ~ 0.19cm2 and lateral ~ 0.11cm2). Linear echodensity on the mitral valve (measuring ~ 0.5cm, clip 23) which likely represents a degenerative valvular strand. Mild (1+ - 2+) TR caused by restricted septal leaflet motion.  Estimated right ventricular systolic pressure is 37 mmHg plus right atrial pressure. Estimated right atrial pressure is not included as the IVC was not seen. 18. 10/29/2020 Outpatient HF [facility]-->Given severe functional mitral regurgitation on cardiac MRI despite GDMT and euvolemia, will check DAVID with 3D multiplanar imaging to assess suitability for Mitraclip and refer to structural intervention for evaluation for MitraClip. Given options for up titration of GDMT and MitraClip evaluation will hold off on advanced therapy evaluations at this time, however we will continue to follow patient for advanced therapy needs in the future. Pt is being evaluated by Dr. Kaylin Jordan regarding ICD. Discussed with Dr. Kaylin Jordan, will repeat TTE in 3 months after above interventions and if LVEF remains <35% will proceed with ICD. We will follow up in 2 weeks for Entresto up titration  19. 10/29/2020 Outpatient EP  @ [facility] Dr Overton-->Dilated CM due to chemotherapy. ECG reveals atypical LBBB [id number]. I discussed her case with Dr. Larry Valencia. We will optimize medical therapy and consider mitraclip and reassess in a few months. She is not keen on wearing the ICD vest which I do not think this is particularly helpful since the Vest trial was in patients with recent MI and even in that population the results were not encouraging. 20. 11/17/2020 DAVID [facility]: The left ventricle is dilated. Left ventricular systolic function is severely decreased. EF = 25 ± 5% (visual est.) .The right ventricle is normal in size. Right ventricular systolic function is mildly decreased.  The left atrial cavity is dilated. Prominent PFO tunnel with left-to-right shunting with color Doppler (and no right to left shunting by agitated saline). There is moderately severe (3+) mitral valve regurgitation due to apical  tethering of normal mitral leaflet caused by LV enlargement. There are 2 jets of  MR - a more prominent medial jet with reduced leaflet coaptation at this area and another smaller jet laterally.  There is late systolic pulmonary venous flow reversal in the RUPV. EROA by PISA likely underestimated (3D VCA of medial jet ~ 0.19cm2 and lateral ~ 0.11cm2). Linear echodensity on the mitral valve (measuring ~ 0.5cm, clip 23) which likely represents a degenerative valvular strand. Mild (1+ - 2+) tricuspid valve regurgitation caused by restricted   septal leaflet motion.  Estimated right ventricular systolic pressure is 37 mmHg plus right atrial pressure. Estimated right atrial pressure is not included as the IVC was not seen.    21. Exam was compared with the prior CC echocardiographic exam performed on     22. 9/23/2020. Similar findings with better DAVID resolution.    23. 11/17/2020 Advanced HF Outpatient [facility]-->\"Status post DAVID today showing moderately severe mitral regurgitation. Follow-up arranged with structural intervention team regarding candidacy for MitraClip. We will delay initiation of spironolactone at this time due to left heart catheterization tomorrow. We will follow-up with virtual visit in 2 weeks and initiate spironolactone 12.5 mg daily if stable. Evaluated by Dr. Yamilet Quesada on 10/29/2020 for ICD. Recommended follow-up echocardiogram in 3 months and if LVEF remains less than 35% will proceed with ICD. Given atypical left bundle branch block and  ms, Dr. Yamilet Quesada felt CRT would not be beneficial.\"  24. 11/18/2020 [facility]: Normal appearing coronaries with mild luminal irregularities in LAD and RCA. Right dominant system. LVEDP 6mmHg  25. 1/14/2021 Structural Team [facility]-->reasonable candidate for a MitraClip, though the origin of the regurgitant jet is somewhat wide. I think a Carillon could be a reasonable strategy, but that trial is currently on hold because of the COVID pandemic. Nevertheless, compassionate use Carillon is approved by the FDA for patients who have residual MR despite MitraClip therapy and so that may be another way in which to approach her valve.  Currently, she would not be a candidate for any of the transcatheter mitral valve replacements given the severity of her LV dysfunction and an ejection fraction of less than 30%, but obviously that is on older echocardiograms and things may be different on the upcoming one.1/16/2021 Structural VV [facility]-->recommend percutaneous treatment of the MV. Surgery will be high risk. 26. 1/19/2021 p-BNP 1596, Bun/Cr 26/1.36  27. 1/19/2021 TTE [facility]: left ventricle is moderately dilated. There is eccentric left ventricular  hypertrophy. Left ventricular systolic function is severely decreased. EF = 20 ±   5% (visual est.) Definity contrast used for endocardial border detection. Left   ventricular diastolic function was not evaluated due to >2+ MR. There is a small left ventricular thrombus in the apex measuring 1.0 cm x 0.6 cm.  RV is normal in size. Right ventricular systolic function is normal.  The left atrial cavity is severely dilated.  Moderate (2+ - 3+) holosystolic MR due to apical tethering of normal mitral leaflet caused by LV enlargement likely related  to nonischemic cardiomyopathy. Regurgitant orifice area (PISA) is 0.19 cm².   Estimated right ventricular systolic pressure is 22 mmHg consistent with normal  pulmonary artery pressures. Estimated right atrial pressure is 3 mmHg based on IVC assessment.  Small atrial-level shunt appreciated by color Doppler (clip 79).   There is a suspicious small clot ( 0.8cm x 0.8cm ) in the LV apex. Exam was compared with the prior CC echocardiographic exam performed on  9/23/2020. LV apical thrombus is new. LVEF is unchanged. MR appears slightly improved.    28. 1/20/2021 Outpatient Cardiology [facility]-->recommended Lovenox bridge to Coumadin  29. 3/24/2021 Outpatient EP [facility]-->clear indication for an ICD for primary prevention however indication for CRT is unclear in her situation given her NYHA class and nonleft bundle branch block ECG.  We discussed indication for an ICD at length she would like to think about this and call my office when she is ready to proceed. 30. 4/26/2021 p-BNP 1081, Bun/Cr 22/1.34  31. 4/27/2021 Outpatient Cardiology [facility]-->Functional status has improved significantly since Entresto initiation and repeat echocardiogram shows ongoing moderate mitral regurgitation. Discussed with Dr. Amada Mills and agree with holding off on MitraClip at this time and reevaluating intervention if symptoms worsen and/or mitral regurgitation worsens in the future. Follow-up in July 2021 with follow-up echocardiogram to reevaluate LV thrombus and mitral regurgitation. Will also discuss ICD again with patient at that time. 32. 6/30/2021 Bun/Cr 19/1.4  33. 7/28/2021 TTE  [facility]: LV severely dilated. EF = 25 ± 5% (visual est.) Definity contrast used for endocardial border detection. RV normal in size. Right ventricular systolic function is normal. LA severely dilated. Probable left to right shunting across the interatrial septum better demonstrated on prior DAVID. Exam was compared with the prior  echocardiographic exam performed on 1/19/2021. On direct side by side comparison there is reduction in the degree of mitral regurgitation and minimal improvement in LVEF. No definite echocardiographic evidence of LV thrombus as suspected on prior echo. 34. 7/28/2021 per HF Cardiologist at [facility]-->\"Continue Entresto 24/26 mg twice daily. Continue Toprol XL 25 mg daily.  Heart rate 52 today.  At max tolerated dose.  Continue spironolactone 12.5 mg daily Functional status has improved significantly since Entresto initiation and repeat echocardiogram shows ongoing mold-moderate mitral regurgitation. Discussed with Dr. Amada Mills and agree with holding off on MitraClip at this time and reevaluating intervention if symptoms worsen and/or mitral regurgitation worsens in the future.  LVEF remains less than 35% after >3 months of GDMT. Given atypical left bundle branch block and  ms, Dr. Bob Owens CRT would not be beneficial, but offered ICD for primary prevention which patient is not interested at this time, but if she changes her mind in the future she will call Dr. Swetha Sadler office. \" Referred to neurology due to memory loss. Resolution of LV thrombus on TTE 7/28/2021--> Coumadin stopped  35. 9/28/2021 TSH 2.940  36. 11/5/2021 Per Neurology @ [facility] --> [facility] - recent NPT and hippocampal atrophy seen on her MRI raise concerns for an underlying neurodegenerative process, particularly AD. I explained that patient is at a high risk for continued decline and conversion to dementia. 37. NYHA Functional Class: II-III. Stage: C heart failure  Target weight: 139 pounds  38. Completed Moderna Vaccine with Booster in 11/2021    Past Surgical History:  L ankle ORIF, tubal ligation, stem cell transplant, cholecystectomy in 2018, R breast lumpectomy with LN dissection in 2004. Medications Prior to admit:  Prior to Admission medications    Medication Sig Start Date End Date Taking? Authorizing Provider   warfarin (COUMADIN) 4 MG tablet Take 2 tablets by mouth daily 6/18/21   [facility], DO   warfarin (COUMADIN) 1 MG tablet Take 1 tablet by mouth daily 5/5/21   [facility], DO   enoxaparin (LOVENOX) 60 MG/0.6ML injection Inject 0.6 mLs into the skin every 12 hours 2/3/21   [facility], DO   metoprolol succinate (TOPROL XL) 25 MG extended release tablet Take 1 tablet by mouth daily 5/23/20   [facility], DO   sacubitril-valsartan (ENTRESTO) 24-26 MG per tablet Take 1 tablet by mouth 2 times daily 5/22/20   [facility], DO   triamcinolone (KENALOG) 0.1 % cream Apply topically 2/3/18   Historical Provider, MD   acyclovir (ZOVIRAX) 200 MG capsule Take 400 mg by mouth 2 times daily  6/3/18   Historical Provider, MD   Cholecalciferol 2000 units CAPS Take 1 capsule by mouth daily 2/1/18   Historical Provider, MD   gabapentin (NEURONTIN) 100 MG capsule Take 300 mg by mouth See Admin Instructions.  300 MG QAM  MG QHS 6/7/18   Historical Provider, MD therapeutic multivitamin-minerals (THERAGRAN-M) tablet Take 1 tablet by mouth daily. Historical Provider, MD       Current Medications:    Current Facility-Administered Medications: enoxaparin (LOVENOX) injection 60 mg, 60 mg, SubCUTAneous, Q12H  gabapentin (NEURONTIN) capsule 300 mg, 300 mg, Oral, Daily  metoprolol succinate (TOPROL XL) extended release tablet 25 mg, 25 mg, Oral, Daily  sacubitril-valsartan (ENTRESTO) 24-26 MG per tablet 1 tablet, 1 tablet, Oral, BID  gabapentin (NEURONTIN) capsule 400 mg, 400 mg, Oral, Nightly    Allergies:  Penicillins and Vancomycin    Social History:    Lifelong non smoker  Denies ETOH/Illicit Drugs  Activity: Lives with  in 2 story house. No SOB or CP climbing steps. Does Drive. No assistive devices. Code Status: Full Code      Family History: Mother  age 66's from carcinoma . No CAD/PCI/CABG/PPM/ICD  Father  age 80 from dementia/COVID. +TIA. No reported CAD/PCI/CABG/PPM/ICD  6 siblings  Tanya  brother reverse main arteries in heart and had surgery at 7 months old  age 21. REVIEW OF SYSTEMS:     · Constitutional: Denies fatigue, fevers, chills or night sweats  · Eyes: Denies visual changes or drainage  · ENT: Denies headaches or hearing loss. No mouth sores or sore throat. No epistaxis   · Cardiovascular: Denies chest pain, pressure or palpitations. No lower extremity swelling. · Respiratory: Denies LUCERO, cough, orthopnea or PND. No hemoptysis   · Gastrointestinal: Denies hematemesis or anorexia. No hematochezia or melena    · Genitourinary: Denies urgency, dysuria or hematuria. · Musculoskeletal: Denies gait disturbance, weakness or joint complaints  · Integumentary: Denies rash, hives or pruritis   · Neurological: Denies dizziness, headaches or seizures. No numbness or tingling  · Psychiatric: Denies anxiety or depression. · Endocrine: Denies temperature intolerance. No recent weight change.  .  · Hematologic/Lymphatic: Denies abnormal bruising or bleeding. No swollen lymph nodes    PHYSICAL EXAM:   /78   Pulse 64   Temp 96.8 °F (36 °C)   Resp 16   Ht 5' 8\" (1.727 m)   Wt 144 lb (65.3 kg)   SpO2 100%   BMI 21.90 kg/m²   CONST:  Well developed,  female who appears of stated age. Awake, alert and cooperative. No apparent distress. HEENT:   Head- Normocephalic, atraumatic   Eyes- Conjunctivae pink, anicteric  Throat- Oral mucosa pink and moist  Neck-  No stridor, trachea midline, no jugular venous distention. No carotid bruit. CHEST: Chest symmetrical and non-tender to palpation. No accessory muscle use or intercostal retractions  RESPIRATORY: Lung sounds - clear throughout fields   CARDIOVASCULAR:     Heart Ausculation- Regular rate and rhythm, no murmur. No s3, s4 or rub   PV: No lower extremity edema. No varicosities. Pedal pulses palpable, no clubbing or cyanosis   ABDOMEN: Soft, non-tender to light palpation. Bowel sounds present. No palpable masses no organomegaly; no abdominal bruit  MS: Good muscle strength and tone. No atrophy or abnormal movements. : Deferred  SKIN: Warm and dry no statis dermatitis or ulcers   NEURO / PSYCH: Oriented to person, place and time. Speech clear and appropriate. Follows all commands.  Pleasant affect     DATA:    ECG as per Dr Buitrago's interpretation   Tele strips: SR 60's    Diagnostic:    CXR 1/5/2022: No acute process    Labs:   CBC:   Recent Labs     01/05/22 0039   WBC 7.4   HGB 13.2   HCT 40.0        BMP:   Recent Labs     01/05/22 0039      K 3.9   CO2 26   BUN 23   CREATININE 1.3*   LABGLOM 41   CALCIUM 10.2     TFT:   Lab Results   Component Value Date    TSH 3.25 05/05/2020      PT/INR:   Recent Labs     01/05/22 0039   PROTIME 11.0   INR 1.0       CARDIAC ENZYMES:  Recent Labs     01/05/22 0039   TROPHS 12*     LIVER PROFILE:  Recent Labs     01/05/22 0039   AST 36*   ALT 19   LABALBU 4.2   A&P per Dr Julieta Nolan  Electronically signed by Krystal Mary. Dorsie Meigs, APN on 1/5/2022 at 1:19 PM       I independently interviewed and examined the patient. I have reviewed the above documentation completed by the GUILLERMINA. Please see my additional contributions to the HPI, physical exam, and assessment / medical decision making.     Reason for consult: Chest pain     She was not previously known to [facility]) cardiology, follows at [facility] for her cardiology needs.     Mrs. Bette Ely is a 60-year-old  female with a history of nonischemic cardiomyopathy secondary to anthracycline toxicity, moderate to severe functional MR based on TTE done on 1/19/2020, acute myeloid leukemia status post idarubicin and stem cell transplant in November 2017 at [facility], right breast cancer s/p chemotherapy (doxorubicin, radiation and lumpectomy in 2004 followed by tamoxifen x2 years and Aromasin until 2009, CKD stage III, history of PFO with left to right shunt, cognitive impairment per neurology and normal coronaries based on cardiac cath done in November 2020 at [facility] and chronic right bundle branch block presented to hospital with 3 episodes of chest pain. Yesterday, around 1030 she developed severe pain (unable to characterize) 8 out of 10 starting from epigastric region all the way into her neck and also localized over the midsternal area pain lasted about 10 minutes. She had 3 such episodes within 1 hour. She did not notice any radiation of pain. She also denied any associated symptoms such as dyspnea sweating nausea. She denied any aggravating relieving factors. She had a third episode when the EMS service arrived and subsequently transported to the emergency room. She was given aspirin while she was in ER. She denies any further episodes of chest pain since then. Work-up in the emergency room, EKG showed normal sinus rhythm, left anterior fascicular block, LVH with repolarization changes, abnormal EKG.   Lab work showed creatinine 1.3 BUN 23 protest 3.9 troponin XII AST 36 urinalysis normal D-dimer was negative. Blood pressure 123/78 sinus bradycardia with heart rate in the 50s and 60s. O2 sats 100% room air. She was also initiated on guideline directed medical therapy at [facility] including Entresto, Toprol-XL and spironolactone.     Review of Systems:  Cardiac: As per HPI  General: No fever, chills  Pulmonary: As per HPI  GI: No nausea, vomiting  Musculoskeletal: MENENDEZ x 4, no focal motor deficits  Skin: Intact, no rashes  Neuro/Psych: No headache or seizures     Physical Exam:  /76   Pulse 62   Temp 98.4 °F (36.9 °C)   Resp 16   Ht 5' 8\" (1.727 m)   Wt 144 lb (65.3 kg)   SpO2 97%   BMI 21.90 kg/m²   Appearance: Awake, alert, no acute respiratory distress  Skin: Intact, no rash  Head: Normocephalic, atraumatic  ENMT: MMM, no rhinorrhea  Neck: Supple, no carotid bruits  Lungs: Clear to auscultation bilaterally. No wheezes, rales, or rhonchi. Cardiac: Regular rate and rhythm, +S1S2, no murmurs apparent  Abdomen: Soft, +bowel sounds  Extremities: Moves all extremities x 4, no lower extremity edema  Neurologic: No focal motor deficits apparent, normal mood and affect     Telemetry findings reviewed: SR at rate 60s, no new tachy/bradyarrhythmias overnight     EKG: Normal sinus rhythm, left anterior fascicular block, LVH with repolarization changes, abnormal EKG.      Vitals and labs were reviewed: Blood pressure 120/76 heart rate 62 O2 sats 97% on room air     Labs-BUNs/creatinine 23/1.3 rest of the chemistries normal.  Troponin, high-sensitivity 12>> 11, AST/ALT 36/19 CBC normal.  D-dimer less than 200, urinalysis trace amount of ketones trace amount of leukocyte esterase WBC 5-10        TTE-5/21/2020:  Severely reduced left ventricular systolic function.   Ejection fraction is visually estimated at 15-20%.   Normal right ventricular size and function.   There is doppler evidence of stage II diastolic dysfunction.   Left to right interatrial shunting present by color flow.   Moderate mitral regurgitation (secondary MR).  Mild aortic regurgitation.   Mild tricuspid regurgitation.   PASP is estimated at 26 mmHg.     /28/2021 TTE  [facility]: LV severely dilated. EF = 25 ± 5% (visual est.) Definity contrast used for endocardial border detection. RV normal in size. Right ventricular systolic function is normal. LA severely dilated. Probable left to right shunting across the interatrial septum better demonstrated on prior DAVID. Exam was compared with the prior  echocardiographic exam performed on 1/19/2021. On direct side by side comparison there is reduction in the degree of mitral regurgitation and minimal improvement in LVEF. No definite echocardiographic evidence of LV thrombus as suspected on prior echo. 7/28/2021 per HF Cardiologist at [facility]-->\"Continue Entresto 24/26 mg twice daily. Continue Toprol XL 25 mg daily.  Heart rate 52 today.  At max tolerated dose.  Continue spironolactone 12.5 mg daily Functional status has improved significantly since Entresto initiation and repeat echocardiogram shows ongoing mold-moderate mitral regurgitation. Discussed with Dr. Aleja Huerta and agree with holding off on MitraClip at this time and reevaluating intervention if symptoms worsen and/or mitral regurgitation worsens in the future. LVEF remains less than 35% after >3 months      11/18/2020 [facility]: Normal appearing coronaries with mild luminal irregularities in LAD and RCA. Right dominant system.  LVEDP 6mmHg.     10/14/2020 Cardiac MRI: The left ventricle is dilated (EDVi = 148 cc/m2 ), and has   significantly reduced function (LVEF = 27 % ). No discrete myocardial fibrosis on delayed enhancement imaging to suggest an infiltrative process or prior myocardial/ischemic injury. No evidence of LV thrombus. The right ventricle is dilated (EDVi = 101 cc/m2 ), and has severely reduced function (RVEF = 29 % ). There is malcoaptation of the mitral leaflets with severe central MR (RF 40%).  The tricuspid valve exhibits mild to moderate TR visually. There is mild AI.     5/27/2020 NM PET/CT Cardiac Perf/Stress: PET Perfusion Study: Normal Perfusion but abnormal EF.  No evidence of ischemia.  No evidence of scarred myocardium. Normal myocardial flow reserve (MFR) globally and in each coronary territory. Functional capacity N/A (pharmacological). Left ventricle is moderately dilated. The left ventricle systolic function is severely decreased. Right ventricle is normal in size. The right ventricle systolic function is normal.  This is a high risk scan due to low EF.  Incidental Findings from limited non-diagnostic CTAC:  No distinct coronary calcifications.         Assessment:  · Chest pain, probably cardiac however Recent in November 2020 showed normal coronaries. 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 after midnight and schedule for a Lexiscan nuclear stress test rule out ischemia. · Continue guideline directed medical therapy for HFrEF including spironolactone 12.5 mg p.o. daily, Entresto 24/26 mg p.o. twice daily and Toprol-XL 25 mg p.o. daily. · Continue rest of current medications per primary service  · She is stable from the cardiology standpoint  · If the stress test comes back negative then she is stable for discharge from cardiology standpoint.        Thank you for consulting and allowing us to participate in Mrs. Roblero' care.  Lianet Moreira MD, Kamille Reis.   [facility]) Cardiology

## 2022-01-06 ENCOUNTER — APPOINTMENT (OUTPATIENT)
Dept: NUCLEAR MEDICINE | Age: 67
End: 2022-01-06
Payer: MEDICARE

## 2022-01-06 ENCOUNTER — APPOINTMENT (OUTPATIENT)
Dept: NON INVASIVE DIAGNOSTICS | Age: 67
End: 2022-01-06
Payer: MEDICARE

## 2022-01-06 VITALS
HEIGHT: 68 IN | HEART RATE: 66 BPM | DIASTOLIC BLOOD PRESSURE: 45 MMHG | WEIGHT: 144 LBS | OXYGEN SATURATION: 98 % | SYSTOLIC BLOOD PRESSURE: 102 MMHG | RESPIRATION RATE: 18 BRPM | TEMPERATURE: 97.5 F | BODY MASS INDEX: 21.82 KG/M2

## 2022-01-06 LAB
LV EF: 35 %
LVEF MODALITY: NORMAL

## 2022-01-06 PROCEDURE — 96372 THER/PROPH/DIAG INJ SC/IM: CPT

## 2022-01-06 PROCEDURE — 93016 CV STRESS TEST SUPVJ ONLY: CPT | Performed by: INTERNAL MEDICINE

## 2022-01-06 PROCEDURE — 78452 HT MUSCLE IMAGE SPECT MULT: CPT | Performed by: INTERNAL MEDICINE

## 2022-01-06 PROCEDURE — 3430000000 HC RX DIAGNOSTIC RADIOPHARMACEUTICAL: Performed by: RADIOLOGY

## 2022-01-06 PROCEDURE — 6370000000 HC RX 637 (ALT 250 FOR IP): Performed by: INTERNAL MEDICINE

## 2022-01-06 PROCEDURE — 78452 HT MUSCLE IMAGE SPECT MULT: CPT

## 2022-01-06 PROCEDURE — G0378 HOSPITAL OBSERVATION PER HR: HCPCS

## 2022-01-06 PROCEDURE — 6360000002 HC RX W HCPCS: Performed by: INTERNAL MEDICINE

## 2022-01-06 PROCEDURE — A9500 TC99M SESTAMIBI: HCPCS | Performed by: RADIOLOGY

## 2022-01-06 PROCEDURE — 93017 CV STRESS TEST TRACING ONLY: CPT

## 2022-01-06 PROCEDURE — 93018 CV STRESS TEST I&R ONLY: CPT | Performed by: INTERNAL MEDICINE

## 2022-01-06 PROCEDURE — 6370000000 HC RX 637 (ALT 250 FOR IP): Performed by: NURSE PRACTITIONER

## 2022-01-06 PROCEDURE — 99214 OFFICE O/P EST MOD 30 MIN: CPT | Performed by: INTERNAL MEDICINE

## 2022-01-06 RX ADMIN — REGADENOSON 0.4 MG: 0.08 INJECTION, SOLUTION INTRAVENOUS at 09:26

## 2022-01-06 RX ADMIN — Medication 12 MILLICURIE: at 08:20

## 2022-01-06 RX ADMIN — SPIRONOLACTONE 12.5 MG: 25 TABLET ORAL at 10:00

## 2022-01-06 RX ADMIN — Medication 35 MILLICURIE: at 10:07

## 2022-01-06 RX ADMIN — METOPROLOL SUCCINATE 25 MG: 25 TABLET, EXTENDED RELEASE ORAL at 11:56

## 2022-01-06 RX ADMIN — ENOXAPARIN SODIUM 60 MG: 100 INJECTION SUBCUTANEOUS at 11:57

## 2022-01-06 RX ADMIN — GABAPENTIN 300 MG: 300 CAPSULE ORAL at 10:00

## 2022-01-06 RX ADMIN — SACUBITRIL AND VALSARTAN 1 TABLET: 24; 26 TABLET, FILM COATED ORAL at 11:57

## 2022-01-06 ASSESSMENT — PAIN SCALES - GENERAL
PAINLEVEL_OUTOF10: 0
PAINLEVEL_OUTOF10: 0

## 2022-01-06 NOTE — PROCEDURES
Pharmacologic Nuclear Stress Test    Date: 1/6/2022    Indication: Chest pain    Description of procedure:    Protocol: Regadenoson stress SPECT myocardial perfusion imaging    Baseline EKG: NSR 67 bpm. LAFB. QRSd 136 ms. Baseline BP: 140/80 mmHg    0.4 mg of regadenoson was injected followed by radiotracer injection. Patient reported no chest pain. Stress EKG showed no evidence of ischemia, and no arrhythmias were noted. Impression:  1. No evidence of regadenoson induced ischemia on stress EKG. 2. Nuclear images to be reported separately.     Jenna Lemus MD, [address] [facility] Cardiology

## 2022-01-06 NOTE — PROGRESS NOTES
INPATIENT CARDIOLOGY FOLLOW-UP    Name: Tessa Benitez    Age: 73 y.o. Date of Admission: 1/4/2022 11:39 PM    Date of Service: 1/6/2022    Chief Complaint: Follow-up for chest pain     Interim History:  No new overnight cardiac complaints. She denies any further episodes of chest pain. Currently with no complaints of CP, SOB, palpitations, dizziness, or lightheadedness. SR on telemetry. Review of Systems:   Cardiac: As per HPI  General: No fever, chills  Pulmonary: As per HPI  HEENT: No visual disturbances, difficult swallowing  GI: No nausea, vomiting  Endocrine: No thyroid disease or DM  Musculoskeletal: MENENDEZ x 4, no focal motor deficits  Skin: Intact, no rashes  Neuro/Psych: No headache or seizures    Problem List:  Patient Active Problem List   Diagnosis    Chest pain    Ganglion cyst of finger of left hand    Glenohumeral arthritis, left    Impingement syndrome of left shoulder    Acute pancreatitis    Gurgling breath sounds    Acute congestive heart failure (HCC)       Allergies:   Allergies   Allergen Reactions    Penicillins Shortness Of Breath     Pt said she \"passed out\"    Vancomycin Itching     Pt said she gets a \"redness and swelling\" as well       Current Medications:  Current Facility-Administered Medications   Medication Dose Route Frequency Provider Last Rate Last Admin    enoxaparin (LOVENOX) injection 60 mg  60 mg SubCUTAneous Q12H Bonilla Cervantes MD   60 mg at 01/05/22 2323    gabapentin (NEURONTIN) capsule 300 mg  300 mg Oral Daily Bonilla Cervantes MD   300 mg at 01/05/22 1225    metoprolol succinate (TOPROL XL) extended release tablet 25 mg  25 mg Oral Daily Bonilla Cervantes MD   25 mg at 01/05/22 1225    sacubitril-valsartan (ENTRESTO) 24-26 MG per tablet 1 tablet  1 tablet Oral BID Bonilla Cervantes MD   1 tablet at 01/05/22 2323    gabapentin (NEURONTIN) capsule 400 mg  400 mg Oral Nightly Bonilla Cervantes MD   400 mg at 01/05/22 2051    spironolactone (ALDACTONE) tablet 12.5 mg  12.5 mg Oral Daily Tara Nigel. Daniel APN   12.5 mg at 01/05/22 1707         Physical Exam:  /78   Pulse 98   Temp 98.5 °F (36.9 °C) (Temporal)   Resp 18   Ht 5' 8\" (1.727 m)   Wt 144 lb (65.3 kg)   SpO2 94%   BMI 21.90 kg/m²   Wt Readings from Last 3 Encounters:   01/04/22 144 lb (65.3 kg)   05/22/20 144 lb (65.3 kg)   05/19/20 149 lb (67.6 kg)     Appearance: Awake, alert, no acute respiratory distress  Skin: Intact, no rash  Head: Normocephalic, atraumatic  Eyes: EOMI, no conjunctival erythema  ENMT: No pharyngeal erythema, MMM, no rhinorrhea  Neck: Supple, no elevated JVP, no carotid bruits  Lungs: Clear to auscultation bilaterally. No wheezes, rales, or rhonchi. Cardiac: Regular rate and rhythm, +S1S2, no murmurs apparent  Abdomen: Soft, nontender, +bowel sounds  Extremities: Moves all extremities x 4, no lower extremity edema  Neurologic: No focal motor deficits apparent, normal mood and affect  Peripheral Pulses: Intact posterior tibial pulses bilaterally    Intake/Output:  No intake or output data in the 24 hours ending 01/06/22 1043  No intake/output data recorded.     Laboratory Tests:  Recent Labs     01/05/22 0039      K 3.9      CO2 26   BUN 23   CREATININE 1.3*   GLUCOSE 84   CALCIUM 10.2     Lab Results   Component Value Date    MG 2.0 05/22/2020     Recent Labs     01/05/22  0039   ALKPHOS 61   ALT 19   AST 36*   PROT 6.8   BILITOT 0.6   LABALBU 4.2     Recent Labs     01/05/22  0039   WBC 7.4   RBC 4.19   HGB 13.2   HCT 40.0   MCV 95.5   MCH 31.5   MCHC 33.0   RDW 12.4      MPV 8.5     Lab Results   Component Value Date    CKTOTAL 96 08/28/2012    CKMB 1.1 08/28/2012    TROPONINI 0.02 05/21/2020    TROPONINI <0.01 05/20/2020    TROPONINI <0.01 12/05/2018     Lab Results   Component Value Date    INR 1.0 01/05/2022    INR 2.7 07/21/2021    INR 1.9 07/07/2021    PROTIME 11.0 01/05/2022    PROTIME 29.9 (H) 07/21/2021    PROTIME 21.3 (H) 07/07/2021     Lab Results   Component Value Date    TSH 3.25 05/05/2020     No results found for: LABA1C  No results found for: EAG  No results found for: CHOL  No results found for: TRIG  No results found for: HDL  No results found for: LDLCALC, LDLCHOLESTEROL  No results found for: LABVLDL, VLDL  No results found for: CHOLHDLRATIO    Cardiac Tests:  Telemetry findings reviewed: SR at rate 60s, no new tachy/bradyarrhythmias overnight     EKG: Normal sinus rhythm, left anterior fascicular block, LVH with repolarization changes, abnormal EKG.    Vitals and labs were reviewed: Blood pressure 119/78 heart rate 98 O2 sats 94 % on room air     Labs-BUNs/creatinine 23/1.3 rest of the chemistries normal.  Troponin, high-sensitivity 12>> 11, AST/ALT 36/19 CBC normal.  D-dimer less than 200, urinalysis trace amount of ketones trace amount of leukocyte esterase WBC 5-10, no labs for today        TTE-5/21/2020:  Severely reduced left ventricular systolic function.   Ejection fraction is visually estimated at 15-20%.   Normal right ventricular size and function.   There is doppler evidence of stage II diastolic dysfunction.   Left to right interatrial shunting present by color flow.   Moderate mitral regurgitation (secondary MR).  Mild aortic regurgitation.   Mild tricuspid regurgitation.   PASP is estimated at 26 mmHg.     /28/2021 TTE  [facility]: LV severely dilated. EF = 25 ± 5% (visual est.) Definity contrast used for endocardial border detection. RV normal in size. Right ventricular systolic function is normal. LA severely dilated. Probable left to right shunting across the interatrial septum better demonstrated on prior DAVID. Exam was compared with the prior [facility] echocardiographic exam performed on 1/19/2021. On direct side by side comparison there is reduction in the degree of mitral regurgitation and minimal improvement in LVEF. No definite echocardiographic evidence of LV thrombus as suspected on prior echo.   7/28/2021 per HF Cardiologist at [facility]-->\"Continue Entresto 24/26 mg twice daily. Continue Toprol XL 25 mg daily.  Heart rate 52 today.  At max tolerated dose.  Continue spironolactone 12.5 mg daily Functional status has improved significantly since Entresto initiation and repeat echocardiogram shows ongoing mold-moderate mitral regurgitation. Discussed with Dr. Caty Leavitt and agree with holding off on MitraClip at this time and reevaluating intervention if symptoms worsen and/or mitral regurgitation worsens in the future. LVEF remains less than 35% after >3 months      11/18/2020 [facility]: Normal appearing coronaries with mild luminal irregularities in LAD and RCA. Right dominant system.  LVEDP 6mmHg.     10/14/2020 Cardiac MRI: The left ventricle is dilated (EDVi = 148 cc/m2 ), and has   significantly reduced function (LVEF = 27 % ). No discrete myocardial fibrosis on delayed enhancement imaging to suggest an infiltrative process or prior myocardial/ischemic injury. No evidence of LV thrombus. The right ventricle is dilated (EDVi = 101 cc/m2 ), and has severely reduced function (RVEF = 29 % ). There is malcoaptation of the mitral leaflets with severe central MR (RF 40%). The tricuspid valve exhibits mild to moderate TR visually. There is mild AI.     5/27/2020 NM PET/CT Cardiac Perf/Stress: PET Perfusion Study: Normal Perfusion but abnormal EF.  No evidence of ischemia.  No evidence of scarred myocardium. Normal myocardial flow reserve (MFR) globally and in each coronary territory. Functional capacity N/A (pharmacological). Left ventricle is moderately dilated. The left ventricle systolic function is severely decreased. Right ventricle is normal in size. The right ventricle systolic function is normal.  This is a high risk scan due to low EF.  Incidental Findings from limited non-diagnostic CTAC:  No distinct coronary calcifications.         Assessment:  · Chest pain, probably cardiac.   However, recent in November 2020 showed normal coronaries. 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 · Continue guideline directed medical therapy for HFrEF including spironolactone 12.5 mg p.o. daily, Entresto 24/26 mg p.o. twice daily and Toprol-XL 25 mg p.o. daily. · Continue rest of current medications per primary service  · She is stable from the cardiology standpoint  · If the stress test comes back negative then she is stable for discharge from cardiology standpoint. Follow-up with cardiology service at [facility] as scheduled.  Hayden Owen MD., Adrian Sender.   [facility]) Cardiology

## 2022-01-06 NOTE — CARE COORDINATION
1/6 Update CM note. Patient off floor at this time for stress test. Per cardiology, patient okay to discharge today if stress negative. Will await results. Plan is to return home with  on discharge and he will provide transportation.     Zoey Pulido RN, BSN  [facility] Case Management   Cell: [phone]

## 2022-01-06 NOTE — PLAN OF CARE
Patient's chart updated to reflect:      . - HF care plan, HF education points and HF discharge instructions.  -Orders: 2 gram sodium diet, daily weights, I/O.  -PCP and/or Cardiologist appointment to be scheduled within 7 days of hospital discharge.   Therese Hathaway, RN RN, BSN  Heart Failure Navigator

## 2022-01-06 NOTE — PROGRESS NOTES
Discharged to home. Goals met. Discharge instructions given. Monitor removed and placed in nurses station.

## 2022-01-06 NOTE — PROGRESS NOTES
Juan Mota NP notified via PS regarding stress test results.     Salud Ordaz, RN, BSN  [facility] Case Management   Cell: [phone]

## 2022-01-06 NOTE — DISCHARGE INSTR - DIET
Good nutrition is important when healing from an illness, injury, or surgery. Follow any nutrition recommendations given to you during your hospital stay. If you were given an oral nutrition supplement while in the hospital, continue to take this supplement at home. You can take it with meals, in-between meals, and/or before bedtime. These supplements can be purchased at most local grocery stores, pharmacies, and chain super-stores. If you have any questions about your diet or nutrition, call the hospital and ask for the dietitian. Heart-Healthy Diet: Care Instructions  Your Care Instructions     A heart-healthy diet has lots of vegetables, fruits, nuts, beans, and whole grains, and is low in salt. It limits foods that are high in saturated fat, such as meats, cheeses, and fried foods. It may be hard to change your diet, but even small changes can lower your risk of heart attack and heart disease. Follow-up care is a key part of your treatment and safety. Be sure to make and go to all appointments, and call your doctor if you are having problems. It's also a good idea to know your test results and keep a list of the medicines you take. How can you care for yourself at home? Watch your portions  Use food labels to learn what the recommended servings are for the foods you eat. Eat only the number of calories you need to stay at a healthy weight. If you need to lose weight, eat fewer calories than your body burns (through exercise and other physical activity). Eat more fruits and vegetables  Eat a variety of fruit and vegetables every day. Dark green, deep orange, red, or yellow fruits and vegetables are especially good for you. Examples include spinach, carrots, peaches, and berries. Keep carrots, celery, and other veggies handy for snacks. Buy fruit that is in season and store it where you can see it so that you will be tempted to eat it.   Cook dishes that have a lot of veggies in them, such as stir-fries and soups. Limit saturated fat  Read food labels, and try to avoid saturated fats. They increase your risk of heart disease. Use olive or canola oil when you cook. Bake, broil, grill, or steam foods instead of frying them. Choose lean meats instead of high-fat meats such as hot dogs and sausages. Cut off all visible fat when you prepare meat. Eat fish, skinless poultry, and meat alternatives such as soy products instead of high-fat meats. Soy products, such as tofu, may be especially good for your heart. Choose low-fat or fat-free milk and dairy products. Eat foods high in fiber  Eat a variety of grain products every day. Include whole-grain foods that have lots of fiber and nutrients. Examples of whole-grain foods include oats, whole wheat bread, and brown rice. Buy whole-grain breads and cereals, instead of white bread or pastries. Limit salt and sodium  Limit how much salt and sodium you eat to help lower your blood pressure. Taste food before you salt it. Add only a little salt when you think you need it. With time, your taste buds will adjust to less salt. Eat fewer snack items, fast foods, and other high-salt, processed foods. Check food labels for the amount of sodium in packaged foods. Choose low-sodium versions of canned goods (such as soups, vegetables, and beans). Limit sugar  Limit drinks and foods with added sugar. These include candy, desserts, and soda pop. Limit alcohol  Limit alcohol to no more than 2 drinks a day for men and 1 drink a day for women. Too much alcohol can cause health problems. When should you call for help? Watch closely for changes in your health, and be sure to contact your doctor if:    You would like help planning heart-healthy meals. Where can you learn more? Go to https://yajaira.[facility]. org and sign in to your [employer] account. Enter V137 in the [facility] box to learn more about \"Heart-Healthy Diet: Care Instructions. \" If you do not have an account, please click on the \"Sign Up Now\" link. Current as of: September 8, 2021               Content Version: 13.1  © 2006-2021 [employer]. Care instructions adapted under license by [facility] ([facility]). If you have questions about a medical condition or this instruction, always ask your healthcare professional. [address] any warranty or liability for your use of this information.

## 2022-01-07 NOTE — DISCHARGE SUMMARY
Discharge Summary    Date: 1/7/2022  Patient Name: Arash Lindsey YOB: 1955 Age: 73 y.o. Admit Date: 1/4/2022  Discharge Date: 1/6/2022  Discharge Condition: Stable    Admission Diagnosis  Chest pain (R07.9); Chest pain, unspecified type (R07.9)     Discharge Diagnosis  Active Problems: Chest pain NICM (nonischemic cardiomyopathy) (HCC)Resolved Problems: * No resolved hospital problems. [facility] Stay  Narrative of Hospital Course:  Admitted from ER with chest pain known cardiomyopathy due to Chemo RX seen by cardiology had neg. Stress test was stable discharged home to follow with her cardiologist as outpatient     Consultants:  [address] [place] [address]    Surgeries/procedures Performed:       Treatments:           Discharge Plan/Disposition:  Home    Hospital/Incidental Findings Requiring Follow Up:    Patient Instructions:    Diet: Regular Diet    Activity:Activity as Tolerated  For number of days (if applicable): Other Instructions:    Provider Follow-Up:   No follow-ups on file.      Significant Diagnostic Studies:    Recent Labs:  Admission on 01/04/2022, Discharged on 01/06/2022WBC                                          Date: 01/05/2022Value: 7.4         Ref range: 4.5 - 11.5 E9/L    Status: FinalRBC                                           Date: 01/05/2022Value: 4.19        Ref range: 3.50 - 5.50 E12/L  Status: FinalHemoglobin                                    Date: 01/05/2022Value: 13.2        Ref range: 11.5 - 15.5 g/dL   Status: FinalHematocrit                                    Date: 01/05/2022Value: 40.0        Ref range: 34.0 - 48.0 %      Status: FinalMCV                                           Date: 01/05/2022Value: 95.5        Ref range: 80.0 - 99.9 fL     Status: [address] [place]                                           Date: 01/05/2022Value: 31.5        Ref range: 26.0 - 35.0 pg     Status: [address]                                          Date: 01/05/2022Value: 33.0 Ref range: 32.0 - 34.5 %      Status: FinalRDW                                           Date: 01/05/2022Value: 12.4        Ref range: 11.5 - 15.0 fL     Status: FinalPlatelets                                     Date: 01/05/2022Value: 220         Ref range: 130 - 450 E9/L     Status: FinalMPV                                           Date: 01/05/2022Value: 8.5         Ref range: 7.0 - 12.0 fL      Status: FinalNeutrophils %                                 Date: 01/05/2022Value: 76.8        Ref range: 43.0 - 80.0 %      Status: FinalImmature Granulocytes %                       Date: 01/05/2022Value: 0.3         Ref range: 0.0 - 5.0 %        Status: FinalLymphocytes %                                 Date: 01/05/2022Value: 8.6*        Ref range: 20.0 - 42.0 %      Status: FinalMonocytes %                                   Date: 01/05/2022Value: 10.1        Ref range: 2.0 - 12.0 %       Status: FinalEosinophils %                                 Date: 01/05/2022Value: 3.9         Ref range: 0.0 - 6.0 %        Status: FinalBasophils %                                   Date: 01/05/2022Value: 0.3         Ref range: 0.0 - 2.0 %        Status: FinalNeutrophils Absolute                          Date: 01/05/2022Value: 5.71        Ref range: 1.80 - 7.30 E9/L   Status: FinalImmature Granulocytes #                       Date: 01/05/2022Value: 0.02        Ref range: E9/L               Status: FinalLymphocytes Absolute                          Date: 01/05/2022Value: 0.64*       Ref range: 1.50 - 4.00 E9/L   Status: FinalMonocytes Absolute                            Date: 01/05/2022Value: 0.75        Ref range: 0.10 - 0.95 E9/L   Status: FinalEosinophils Absolute                          Date: 01/05/2022Value: 0.29        Ref range: 0.05 - 0.50 E9/L   Status: FinalBasophils Absolute                            Date: 01/05/2022Value: 0.02        Ref range: 0.00 - 0.20 E9/L   Status: FinalSodium Date: 01/05/2022Value: 140         Ref range: 132 - 146 mmol/L   Status: FinalPotassium                                     Date: 01/05/2022Value: 3.9         Ref range: 3.5 - 5.0 mmol/L   Status: FinalChloride                                      Date: 01/05/2022Value: 100         Ref range: 98 - 107 mmol/L    Status: FinalCO2                                           Date: 01/05/2022Value: 26          Ref range: 22 - 29 mmol/L     Status: FinalAnion Gap                                     Date: 01/05/2022Value: 14          Ref range: 7 - 16 mmol/L      Status: FinalGlucose                                       Date: 01/05/2022Value: 84          Ref range: 74 - 99 mg/dL      Status: FinalBUN                                           Date: 01/05/2022Value: 23          Ref range: 6 - 23 mg/dL       Status: FinalCREATININE                                    Date: 01/05/2022Value: 1.3*        Ref range: 0.5 - 1.0 mg/dL    Status: FinalGFR Non-                      Date: 01/05/2022Value: 41          Ref range: >=60 mL/min/1.73   Status: Final              Comment: Chronic Kidney Disease: less than 60 ml/min/1.73 sq.m. Kidney Failure: less than 15 ml/min/1.73 sq. m. Results valid for patients 18 years and older. GFR                           Date: 01/05/2022Value: 50            Status: FinalCalcium                                       Date: 01/05/2022Value: 10.2        Ref range: 8.6 - 10.2 mg/dL   Status: FinalTotal Protein                                 Date: 01/05/2022Value: 6.8         Ref range: 6.4 - 8.3 g/dL     Status: FinalAlbumin                                       Date: 01/05/2022Value: 4.2         Ref range: 3.5 - 5.2 g/dL     Status: FinalTotal Bilirubin                               Date: 01/05/2022Value: 0.6         Ref range: 0.0 - 1.2 mg/dL    Status: FinalAlkaline Phosphatase                          Date: 01/05/2022Value: 61          Ref range: 35 - 104 U/L Status: FinalALT                                           Date: 01/05/2022Value: 19          Ref range: 0 - 32 U/L         Status: FinalAST                                           Date: 01/05/2022Value: 36*         Ref range: 0 - 31 U/L         Status: FinalTroponin, High Sensitivity                    Date: 01/05/2022Value: 12*         Ref range: 0 - 9 ng/L         Status: Final              Comment: High Sensitivity Troponin values cannot be compared withother Troponin methodologies. Patients with high levels of Biotin oral intake (i.e. >5 mg/day)may have falsely decreased Troponin levels. Samples collectedwithin 8 hours of biotin intake may require additional informationfor diagnosis. Lipase                                        Date: 01/05/2022Value: 35          Ref range: 13 - 60 U/L        Status: FinalLactic Acid                                   Date: 01/05/2022Value: 1.6         Ref range: 0.5 - 2.2 mmol/L   Status: FinalColor, UA                                     Date: 01/05/2022Value: Yellow      Ref range: Straw/Yellow       Status: FinalClarity, UA                                   Date: 01/05/2022Value: Clear       Ref range: Clear              Status: FinalGlucose, Ur                                   Date: 01/05/2022Value: Negative    Ref range: Negative mg/dL     Status: FinalBilirubin Urine                               Date: 01/05/2022Value: Negative    Ref range: Negative           Status: FinalKetones, Urine                                Date: 01/05/2022Value: TRACE*      Ref range: Negative mg/dL     Status: FinalSpecific Gravity, UA                          Date: 01/05/2022Value: 1.020       Ref range: 1.005 - 1.030      Status: FinalBlood, Urine                                  Date: 01/05/2022Value: Negative    Ref range: Negative           Status: FinalpH, UA                                        Date: 01/05/2022Value: 6.0         Ref range: 5.0 - 9.0          Status: FinalProtein, UA Date: 01/05/2022Value: Negative    Ref range: Negative mg/dL     Status: FinalUrobilinogen, Urine                           Date: 01/05/2022Value: 0.2         Ref range: <2.0 E.U./dL       Status: FinalNitrite, Urine                                Date: 01/05/2022Value: Negative    Ref range: Negative           Status: FinalLeukocyte Esterase, Urine                     Date: 01/05/2022Value: TRACE*      Ref range: Negative           Status: FinalProtime                                       Date: 01/05/2022Value: 11.0        Ref range: 9.3 - 12.4 sec     Status: FinalINR                                           Date: 01/05/2022Value: 1.0           Status: FinalVentricular Rate                              Date: 01/05/2022Value: 67          Ref range: BPM                Status: FinalAtrial Rate                                   Date: 01/05/2022Value: 67          Ref range: BPM                Status: FinalP-R Interval                                  Date: 01/05/2022Value: 158         Ref range: ms                 Status: FinalQRS Duration                                  Date: 01/05/2022Value: 146         Ref range: ms                 Status: FinalQ-T Interval                                  Date: 01/05/2022Value: 442         Ref range: ms                 Status: FinalQTc Calculation (Bazett)                      Date: 01/05/2022Value: 467         Ref range: ms                 Status: FinalP Axis                                        Date: 01/05/2022Value: 44          Ref range: degrees            Status: FinalR Axis                                        Date: 01/05/2022Value: -60         Ref range: degrees            Status: FinalT Axis                                        Date: 01/05/2022Value: 98          Ref range: degrees            Status: FinalD-Dimer, Quant                                Date: 01/05/2022Value: <200        Ref range: ng/mL DDU          Status: Final Comment: D-DIMER Interpretation:<  230  ng/mL (D-DU)  Indicates low probability for PE/DVT = 232  ng/mL (D-DU)  Upper Limit of Normal>= 4000 ng/mL (D-DU)  This level could suggest the presence                      of DIC. Clinical correlation may be                      helpful. WBC, UA                                       Date: 01/05/2022Value: 5-10*       Ref range: 0 - 5 /HPF         Status: FinalRBC, UA                                       Date: 01/05/2022Value: 0-1         Ref range: 0 - 2 /HPF         Status: FinalBacteria, UA                                  Date: 01/05/2022Value: RARE*       Ref range: None Seen /HPF     Status: FinalTroponin, High Sensitivity                    Date: 01/05/2022Value: 11*         Ref range: 0 - 9 ng/L         Status: Final              Comment: High Sensitivity Troponin values cannot be compared withother Troponin methodologies. Patients with high levels of Biotin oral intake (i.e. >5 mg/day)may have falsely decreased Troponin levels. Samples collectedwithin 8 hours of biotin intake may require additional informationfor diagnosis.------------    Radiology last 7 days:  XR CHEST PORTABLEResult Date: 1/5/2022No acute process. NM Cardiac Stress Test Nuclear ImagingResult Date: 1/6/20221. The myocardial perfusion is abnormal. 2. There is a small fixed apical defect suggestive of prior MI. No reversible defects to suggest ischemia. 3. Moderate LV systolic dysfunction, EF 97% with wall motion abnormalities as described. 4. There is no transient ischemic dilation. 5. Intermediate risk myocardial perfusion study.       [unfilled]    Discharge Medications    Discharge Medication List as of 1/6/2022  5:19 PM    Discharge Medication List as of 1/6/2022  5:19 PM    Discharge Medication List as of 1/6/2022  5:19 Martir Rosas these medications which have NOT CHANGEDmetoprolol succinate (TOPROL XL) 25 MG extended release tabletTake 1 tablet by mouth daily, Disp-30 tablet, R-3Normalsacubitril-valsartan (ENTRESTO) 24-26 MG per tabletTake 1 tablet by mouth 2 times daily, Disp-60 tablet, R-1NormalCholecalciferol 2000 units CAPSTake 1 capsule by mouth dailyHistorical Medgabapentin (NEURONTIN) 100 MG capsuleTake 300 mg by mouth See Admin Instructions. 300 MG QAM  MG QHSHistorical Medtherapeutic multivitamin-minerals (THERAGRAN-M) tabletTake 1 tablet by mouth daily. Historical Med    Discharge Medication List as of 1/6/2022  5:19 PMSTOP taking these medicationswarfarin (COUMADIN) 4 MG tabletComments:Reason for Stopping:warfarin (COUMADIN) 1 MG tabletComments:Reason for Stopping:enoxaparin (Nehal Co) 60 MG/0.6ML injectionComments:Reason for Stopping:triamcinolone (KENALOG) 0.1 % creamComments:Reason for Stopping:acyclovir (ZOVIRAX) 200 MG capsuleComments:Reason for Stopping:    Time Spent on Discharge:3E] minutes were spent in patient examination, evaluation, counseling as well as medication reconciliation, prescriptions for required medications, discharge plan, and follow up.     Electronically signed by Minor Cabot, MD on 1/7/22 at 8:13 AM EST

## 2023-05-24 PROBLEM — C92.01 ACUTE MYELOID LEUKEMIA IN REMISSION (HCC): Status: ACTIVE | Noted: 2023-05-24

## 2023-05-24 PROBLEM — I34.0 NONRHEUMATIC MITRAL VALVE REGURGITATION: Status: ACTIVE | Noted: 2023-05-24

## 2023-05-24 PROBLEM — K85.90 ACUTE PANCREATITIS: Status: RESOLVED | Noted: 2018-12-05 | Resolved: 2023-05-24

## 2023-05-24 PROBLEM — I50.22 CHRONIC HFREF (HEART FAILURE WITH REDUCED EJECTION FRACTION) (HCC): Status: ACTIVE | Noted: 2023-05-24

## 2023-05-24 PROBLEM — I50.9 ACUTE CONGESTIVE HEART FAILURE (HCC): Status: RESOLVED | Noted: 2020-05-20 | Resolved: 2023-05-24

## 2023-05-24 PROBLEM — N18.31 STAGE 3A CHRONIC KIDNEY DISEASE (HCC): Status: ACTIVE | Noted: 2023-05-24

## 2023-06-27 DIAGNOSIS — E78.5 HYPERLIPIDEMIA, UNSPECIFIED HYPERLIPIDEMIA TYPE: ICD-10-CM

## 2023-06-27 DIAGNOSIS — E55.9 VITAMIN D DEFICIENCY: ICD-10-CM

## 2023-06-27 DIAGNOSIS — R53.83 FATIGUE, UNSPECIFIED TYPE: ICD-10-CM

## 2023-06-27 DIAGNOSIS — N18.31 STAGE 3A CHRONIC KIDNEY DISEASE (HCC): ICD-10-CM

## 2023-06-27 LAB
ALBUMIN SERPL-MCNC: 4.2 G/DL (ref 3.5–5.2)
ALP SERPL-CCNC: 70 U/L (ref 35–104)
ALT SERPL-CCNC: 13 U/L (ref 0–32)
ANION GAP SERPL CALCULATED.3IONS-SCNC: 13 MMOL/L (ref 7–16)
AST SERPL-CCNC: 25 U/L (ref 0–31)
BASOPHILS # BLD: 0.03 E9/L (ref 0–0.2)
BASOPHILS NFR BLD: 0.4 % (ref 0–2)
BILIRUB SERPL-MCNC: 0.5 MG/DL (ref 0–1.2)
BUN SERPL-MCNC: 20 MG/DL (ref 6–23)
CALCIUM SERPL-MCNC: 9.9 MG/DL (ref 8.6–10.2)
CHLORIDE SERPL-SCNC: 105 MMOL/L (ref 98–107)
CHOLESTEROL, TOTAL: 204 MG/DL (ref 0–199)
CO2 SERPL-SCNC: 25 MMOL/L (ref 22–29)
CREAT SERPL-MCNC: 1.3 MG/DL (ref 0.5–1)
EOSINOPHIL # BLD: 0.19 E9/L (ref 0.05–0.5)
EOSINOPHIL NFR BLD: 2.6 % (ref 0–6)
ERYTHROCYTE [DISTWIDTH] IN BLOOD BY AUTOMATED COUNT: 13.1 FL (ref 11.5–15)
GLUCOSE FASTING: 83 MG/DL (ref 74–99)
HCT VFR BLD AUTO: 42.5 % (ref 34–48)
HDLC SERPL-MCNC: 56 MG/DL
HGB BLD-MCNC: 13.6 G/DL (ref 11.5–15.5)
IMM GRANULOCYTES # BLD: 0.02 E9/L
IMM GRANULOCYTES NFR BLD: 0.3 % (ref 0–5)
LDLC SERPL CALC-MCNC: 118 MG/DL (ref 0–99)
LYMPHOCYTES # BLD: 0.66 E9/L (ref 1.5–4)
LYMPHOCYTES NFR BLD: 9.2 % (ref 20–42)
MCH RBC QN AUTO: 30.4 PG (ref 26–35)
MCHC RBC AUTO-ENTMCNC: 32 % (ref 32–34.5)
MCV RBC AUTO: 94.9 FL (ref 80–99.9)
MONOCYTES # BLD: 0.55 E9/L (ref 0.1–0.95)
MONOCYTES NFR BLD: 7.7 % (ref 2–12)
NEUTROPHILS # BLD: 5.72 E9/L (ref 1.8–7.3)
NEUTS SEG NFR BLD: 79.8 % (ref 43–80)
PLATELET # BLD AUTO: 261 E9/L (ref 130–450)
PMV BLD AUTO: 8.7 FL (ref 7–12)
POTASSIUM SERPL-SCNC: 5.9 MMOL/L (ref 3.5–5)
PROT SERPL-MCNC: 6.6 G/DL (ref 6.4–8.3)
RBC # BLD AUTO: 4.48 E12/L (ref 3.5–5.5)
SODIUM SERPL-SCNC: 143 MMOL/L (ref 132–146)
TRIGL SERPL-MCNC: 151 MG/DL (ref 0–149)
TSH SERPL-MCNC: 2.85 UIU/ML (ref 0.27–4.2)
VLDLC SERPL CALC-MCNC: 30 MG/DL
WBC # BLD: 7.2 E9/L (ref 4.5–11.5)

## 2023-06-28 LAB — VITAMIN D 25-HYDROXY: 51 NG/ML (ref 30–100)

## 2023-11-06 PROBLEM — I10 HYPERTENSION: Status: ACTIVE | Noted: 2023-11-06

## 2023-11-06 PROBLEM — I10 BENIGN ESSENTIAL HYPERTENSION: Status: ACTIVE | Noted: 2023-11-06

## 2023-11-06 RX ORDER — PANTOPRAZOLE SODIUM 40 MG/1
1 TABLET, DELAYED RELEASE ORAL DAILY
COMMUNITY
Start: 2017-10-07 | End: 2023-11-07 | Stop reason: ALTCHOICE

## 2023-11-06 RX ORDER — SACUBITRIL AND VALSARTAN 24; 26 MG/1; MG/1
TABLET, FILM COATED ORAL
COMMUNITY
Start: 2023-08-24

## 2023-11-06 RX ORDER — GABAPENTIN 100 MG/1
300 CAPSULE ORAL EVERY MORNING
COMMUNITY
Start: 2023-09-23 | End: 2023-11-07 | Stop reason: ALTCHOICE

## 2023-11-06 RX ORDER — DIAPER,BRIEF,INFANT-TODD,DISP
EACH MISCELLANEOUS
COMMUNITY
Start: 2017-12-26 | End: 2023-11-07 | Stop reason: ALTCHOICE

## 2023-11-06 RX ORDER — TACROLIMUS 1 MG/1
CAPSULE ORAL
COMMUNITY
Start: 2017-12-20 | End: 2023-11-07 | Stop reason: ALTCHOICE

## 2023-11-06 RX ORDER — FUROSEMIDE 20 MG/1
20 TABLET ORAL DAILY PRN
COMMUNITY
Start: 2023-09-02 | End: 2023-11-07 | Stop reason: ALTCHOICE

## 2023-11-06 RX ORDER — PROCHLORPERAZINE MALEATE 10 MG
TABLET ORAL
COMMUNITY
Start: 2017-12-26 | End: 2023-11-07 | Stop reason: ALTCHOICE

## 2023-11-06 RX ORDER — ACYCLOVIR 400 MG/1
400 TABLET ORAL 3 TIMES DAILY
COMMUNITY
Start: 2017-10-07 | End: 2023-11-07 | Stop reason: ALTCHOICE

## 2023-11-06 RX ORDER — METOPROLOL SUCCINATE 25 MG/1
25 TABLET, EXTENDED RELEASE ORAL DAILY
COMMUNITY
Start: 2023-10-15 | End: 2023-11-07 | Stop reason: ALTCHOICE

## 2023-11-06 RX ORDER — OXYCODONE HYDROCHLORIDE 5 MG/1
TABLET ORAL
COMMUNITY
Start: 2017-12-30 | End: 2023-11-07 | Stop reason: ALTCHOICE

## 2023-11-06 RX ORDER — SPIRONOLACTONE 25 MG/1
12.5 TABLET ORAL DAILY
COMMUNITY
Start: 2023-09-30 | End: 2023-11-07 | Stop reason: ALTCHOICE

## 2023-11-06 RX ORDER — URSODIOL 300 MG/1
CAPSULE ORAL
COMMUNITY
Start: 2017-12-26 | End: 2023-11-07 | Stop reason: ALTCHOICE

## 2023-11-06 RX ORDER — POTASSIUM CHLORIDE 750 MG/1
10 TABLET, EXTENDED RELEASE ORAL DAILY PRN
COMMUNITY
Start: 2023-09-02

## 2023-11-06 RX ORDER — PREDNISONE 10 MG/1
TABLET ORAL
COMMUNITY
Start: 2017-12-28 | End: 2023-11-07 | Stop reason: ALTCHOICE

## 2023-11-06 RX ORDER — FLUCONAZOLE 200 MG/1
TABLET ORAL
COMMUNITY
Start: 2017-12-26 | End: 2023-11-07 | Stop reason: ALTCHOICE

## 2023-11-06 RX ORDER — CIPROFLOXACIN 500 MG/1
TABLET ORAL
COMMUNITY
Start: 2017-12-29 | End: 2023-11-07 | Stop reason: ALTCHOICE

## 2023-11-06 RX ORDER — LISINOPRIL 10 MG/1
1 TABLET ORAL DAILY
COMMUNITY
Start: 2017-12-28 | End: 2023-11-07 | Stop reason: ALTCHOICE

## 2023-11-06 RX ORDER — MULTIVITAMIN
TABLET ORAL
COMMUNITY

## 2023-11-07 ENCOUNTER — OFFICE VISIT (OUTPATIENT)
Dept: HEMATOLOGY/ONCOLOGY | Facility: CLINIC | Age: 68
End: 2023-11-07
Payer: COMMERCIAL

## 2023-11-07 ENCOUNTER — LAB (OUTPATIENT)
Dept: LAB | Facility: CLINIC | Age: 68
End: 2023-11-07
Payer: COMMERCIAL

## 2023-11-07 VITALS
TEMPERATURE: 97.5 F | RESPIRATION RATE: 18 BRPM | HEART RATE: 56 BPM | WEIGHT: 171.96 LBS | BODY MASS INDEX: 26.37 KG/M2 | SYSTOLIC BLOOD PRESSURE: 131 MMHG | OXYGEN SATURATION: 97 % | DIASTOLIC BLOOD PRESSURE: 79 MMHG

## 2023-11-07 DIAGNOSIS — C92.01 AML (ACUTE MYELOID LEUKEMIA) IN REMISSION (MULTI): Primary | ICD-10-CM

## 2023-11-07 PROBLEM — C50.919 BREAST CANCER IN FEMALE (MULTI): Status: ACTIVE | Noted: 2023-11-07

## 2023-11-07 LAB
BASOPHILS # BLD AUTO: 0.04 X10*3/UL (ref 0–0.1)
BASOPHILS NFR BLD AUTO: 0.9 %
EOSINOPHIL # BLD AUTO: 0.2 X10*3/UL (ref 0–0.7)
EOSINOPHIL NFR BLD AUTO: 4.5 %
ERYTHROCYTE [DISTWIDTH] IN BLOOD BY AUTOMATED COUNT: 13.1 % (ref 11.5–14.5)
HCT VFR BLD AUTO: 39.1 % (ref 36–46)
HGB BLD-MCNC: 12.8 G/DL (ref 12–16)
IMM GRANULOCYTES # BLD AUTO: 0 X10*3/UL (ref 0–0.7)
IMM GRANULOCYTES NFR BLD AUTO: 0 % (ref 0–0.9)
LYMPHOCYTES # BLD AUTO: 1.15 X10*3/UL (ref 1.2–4.8)
LYMPHOCYTES NFR BLD AUTO: 26.1 %
MCH RBC QN AUTO: 31.1 PG (ref 26–34)
MCHC RBC AUTO-ENTMCNC: 32.7 G/DL (ref 32–36)
MCV RBC AUTO: 95 FL (ref 80–100)
MONOCYTES # BLD AUTO: 0.4 X10*3/UL (ref 0.1–1)
MONOCYTES NFR BLD AUTO: 9.1 %
NEUTROPHILS # BLD AUTO: 2.61 X10*3/UL (ref 1.2–7.7)
NEUTROPHILS NFR BLD AUTO: 59.4 %
NRBC BLD-RTO: ABNORMAL /100{WBCS}
PLATELET # BLD AUTO: 251 X10*3/UL (ref 150–450)
RBC # BLD AUTO: 4.12 X10*6/UL (ref 4–5.2)
WBC # BLD AUTO: 4.4 X10*3/UL (ref 4.4–11.3)

## 2023-11-07 PROCEDURE — 85025 COMPLETE CBC W/AUTO DIFF WBC: CPT | Performed by: INTERNAL MEDICINE

## 2023-11-07 PROCEDURE — 80053 COMPREHEN METABOLIC PANEL: CPT | Performed by: INTERNAL MEDICINE

## 2023-11-07 PROCEDURE — 3078F DIAST BP <80 MM HG: CPT | Performed by: INTERNAL MEDICINE

## 2023-11-07 PROCEDURE — 36415 COLL VENOUS BLD VENIPUNCTURE: CPT | Performed by: INTERNAL MEDICINE

## 2023-11-07 PROCEDURE — 99214 OFFICE O/P EST MOD 30 MIN: CPT | Performed by: INTERNAL MEDICINE

## 2023-11-07 PROCEDURE — 1160F RVW MEDS BY RX/DR IN RCRD: CPT | Performed by: INTERNAL MEDICINE

## 2023-11-07 PROCEDURE — 83735 ASSAY OF MAGNESIUM: CPT | Performed by: INTERNAL MEDICINE

## 2023-11-07 PROCEDURE — 3075F SYST BP GE 130 - 139MM HG: CPT | Performed by: INTERNAL MEDICINE

## 2023-11-07 PROCEDURE — 1159F MED LIST DOCD IN RCRD: CPT | Performed by: INTERNAL MEDICINE

## 2023-11-07 PROCEDURE — 1126F AMNT PAIN NOTED NONE PRSNT: CPT | Performed by: INTERNAL MEDICINE

## 2023-11-07 PROCEDURE — 83615 LACTATE (LD) (LDH) ENZYME: CPT | Performed by: INTERNAL MEDICINE

## 2023-11-07 RX ORDER — GABAPENTIN 400 MG/1
400 CAPSULE ORAL EVERY EVENING
COMMUNITY
End: 2023-12-26

## 2023-11-07 RX ORDER — METOPROLOL SUCCINATE 25 MG/1
25 TABLET, EXTENDED RELEASE ORAL DAILY
COMMUNITY
Start: 2024-06-06

## 2023-11-07 RX ORDER — SPIRONOLACTONE 25 MG/1
12.5 TABLET ORAL DAILY
COMMUNITY

## 2023-11-07 RX ORDER — GABAPENTIN 100 MG/1
300 CAPSULE ORAL DAILY
COMMUNITY
End: 2023-12-26

## 2023-11-07 RX ORDER — FUROSEMIDE 20 MG/1
20 TABLET ORAL DAILY PRN
COMMUNITY

## 2023-11-07 ASSESSMENT — PAIN SCALES - GENERAL: PAINLEVEL: 0-NO PAIN

## 2023-11-07 NOTE — PROGRESS NOTES
Patient ID: Abril Quispe is a 67 y.o. female.    Diagnosis:   Problem List Items Addressed This Visit    None       Treatment:   Oncology History Overview Note   1. AML- 7/17 presenting with 2 month history of excruciating right sided pleuritic chest pain worse when lying down and a 15 pound weight loss. 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      Treatment:      Induction with 7&3 chemotherapy 8/12/17 uncomplicated course.     Response: BM biopsy 9/14/17 consistent with complete remission by histology and flow. S/P high dose cytarabine consolidation 9/21-9/25/17.  PET imaging October 9 2017 showed complete resolution of all previously PET avid areas.   Received cycle 1 of consolidation with HiDAC 10/19/17.   Consolidation with MUD SCT (T0=12/6/17) conditioned with Flu/Bu 4800 and GVHD PPx with FK and standard-dose methotrexate on the control  arm of CTN 1301.  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 resolved   Day 100 BM 3/12/18 which was consistent with complete remission by histology and flow, chimerism 3% recipient by routine, CD33 and 30% by CD3.  Remains in complete remission    2. Hx of Stage I Tc N0M0 adenocarcinoma of right breast 2004 ERPR positive Her2 kathryn negative. S/P lumpectomy AC chemotherapy and local xrt followed by tamoxifen x 2 years, aromasin until 2009.     3. 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 Severe CHF : Admitted to [facility] (5/20-22/2020) for acute congestive HF (EF = 15-20%, BNP = 12,686) with b/l pleural effusions, then was transferred to [facility] for  further care (discharged  5/25/20). She is followed by Dr. Neo Knight (cardiology at [facility]).  She is currently on Lisinpril 2.5 mg daily, Toprolol XL 12.5 mg daily & Lasix 20 mg as needed.      AML (acute myeloid leukemia) in remission (CMS/HCC)   11/7/2023 Initial Diagnosis    AML (acute myeloid leukemia) in remission (CMS/HCC)     Breast cancer in female (CMS/HCC)   11/7/2023 Initial Diagnosis    Breast cancer in female (CMS/HCC)         Response:     Past Medical History:   No past medical history on file.    Surgical History:     Past Surgical History:   Procedure Laterality Date    IR CVC TUNNELED  11/30/2017    IR CVC TUNNELED 11/30/2017 [facility] AIB LEGAC[facility]        Family History:     Family History   Problem Relation Name Age of Onset    Other (ANOMALY HEART) Brother         Social History:       -------------------------------------------------------------------------------------------------------  Subjective       HPI    Patient is seen today (11/7/23) for  follow up after SCT and monitoring for cGVHD and late effects.    Her heart failure is improving on Entresto a last heart function was 35%. On ECHO. She is accompanied by her spouse.  Reports some continued  evanescent GVHD rashes which go away  after applying topical steroids and tacrolimus cream per dermatology and has only occurred once or twice a year.  Continues taking gabapentin for neuropathy with good relief. She reports  no diarrhea, indigestion/ GI upset, joint stiffness, eye dryness or mouth dryness. She has no other signs of active GVHD. She reports no history of infection since last visit. Endorses good energy levels,although has slowed down some.  Has ongoing memory issues which her  feels is somewhat worse, patient rarely cooks, but able to drive and do all her household chores. She gains 6 kg since last year.       Review of Systems   All other systems reviewed and are negative.      -------------------------------------------------------------------------------------------------------  Objective   BSA: There is no height or weight on file to calculate BSA.  There were no vitals taken for this visit.    Physical Exam  Constitutional:       Appearance: Normal appearance.      Comments: Looks well, appropriate conversation, no obvious memory lapses on interview   HENT:      Head: Normocephalic and atraumatic.      Nose: Nose normal.   Eyes:      Extraocular Movements: Extraocular movements intact.      Conjunctiva/sclera: Conjunctivae normal.      Pupils: Pupils are equal, round, and reactive to light.   Cardiovascular:      Rate and Rhythm: Normal rate and regular rhythm.   Pulmonary:      Breath sounds: Normal breath sounds.   Abdominal:      General: Abdomen is flat. Bowel sounds are normal.      Palpations: Abdomen is soft.   Musculoskeletal:         General: Normal range of motion.   Skin:     General: Skin is warm and dry.      Findings: No rash.   Neurological:      General: No focal deficit present.      Mental Status: She is oriented to person, place, and time.   Psychiatric:         Mood and Affect: Mood normal.         Behavior: Behavior normal.         Thought Content: Thought content normal.         Performance Status:  Symptomatic; fully ambulatory  -------------------------------------------------------------------------------------------------------  Assessment/Plan      Ms. Quispe is a 67 year-old woman with a remote history  of early stage breast cancer treated with AC and radiation therapy, and then diagnosed with AML t (8,21), cKIT mutation negative and additional chromosomal abnormalities including deletion of chromosome X (which is of no clinical consequence) and del  12p which is of unknown prognostic significance. She achieved a complete remission by histology and flow after 7 + 3. Due to her extramedullary disease at presentation, additional cytogenetic abnormalities,  treatment-related AML and excellent donor, she  proceeded to myeloablative allograft in first CR. Patient enrolled on CTN 1301, randomized to control arm with Flu/Bu 4800 and aGVHD prophylaxis with tacrolimus and full-dose MTX. T0=12/6/2017.     Onc: Currently status post MUD bone marrow for treatment-related AML.  Preparative regimen includes Flu/Bu with GVHD prophylaxis of tacrolimus/MTX  on [id number].  Patient A+ blood type, donor A+.  On  [facility] disease registry 2913 , and explained no substantive changes in consent which in non therapeutic data collection.    GVHD:   off tacro as of 3/17/20.    Has minimal GVHD of skin with use of topicals prn  Chronic renal failure- creat 1.3 and stable (1.3 - 1.45 baseline). Will continue to monitor.      Cardiology: Profound CHF noted May 2020 with LVEF 15-20% following with cardiology, most likley late effect of prior therapy. Echo (10/2/18) =  50-55% EF Reports recent SOB. Follow up with cardiology and Echo on 9/23/20, with EF 20%.  Started on warfarin for new RV thrombus. Slowly improving, follows with cardiology      Health Maintenance: Up to date on mammograms, DEXA scan (performed in [place] in 11/2018 normal, TSH,     Immunizations: Recv'd 6 month post tx vaccinations 5/31/18., 8 months 7/2018, 10 month   9/11/18. She  received flu vaccine 11/2019.  Repeated Prevnar 12/10/18.  23 valent pneumococcus, MMR 4/27/21, Shingrix 9/2020.  Completed Moderna COVID vaccine 4/13/21.  Had updated COVID vaccine, planning to get influenza vaccine  up to date on latest covid booster and influenza  Advised to get rsv vaccine, consider prevnar 20.  Discussed health maintenance, up to date on mammograms, gyn exams etc     RTC:  1 year for MD visit, labs ,             -------------------------------------------------------------------------------------------------------  Allie Stoll MD

## 2023-11-08 LAB
ALBUMIN SERPL BCP-MCNC: 4.1 G/DL (ref 3.4–5)
ALP SERPL-CCNC: 71 U/L (ref 33–136)
ALT SERPL W P-5'-P-CCNC: 18 U/L (ref 7–45)
ANION GAP SERPL CALC-SCNC: 10 MMOL/L (ref 10–20)
AST SERPL W P-5'-P-CCNC: 24 U/L (ref 9–39)
BILIRUB SERPL-MCNC: 0.4 MG/DL (ref 0–1.2)
BUN SERPL-MCNC: 20 MG/DL (ref 6–23)
CALCIUM SERPL-MCNC: 9.6 MG/DL (ref 8.6–10.6)
CHLORIDE SERPL-SCNC: 104 MMOL/L (ref 98–107)
CO2 SERPL-SCNC: 33 MMOL/L (ref 21–32)
CREAT SERPL-MCNC: 1.18 MG/DL (ref 0.5–1.05)
GFR SERPL CREATININE-BSD FRML MDRD: 51 ML/MIN/1.73M*2
GLUCOSE SERPL-MCNC: 77 MG/DL (ref 74–99)
LDH SERPL L TO P-CCNC: 166 U/L (ref 84–246)
MAGNESIUM SERPL-MCNC: 2.25 MG/DL (ref 1.6–2.4)
POTASSIUM SERPL-SCNC: 4.3 MMOL/L (ref 3.5–5.3)
PROT SERPL-MCNC: 6 G/DL (ref 6.4–8.2)
SODIUM SERPL-SCNC: 143 MMOL/L (ref 136–145)

## 2023-12-26 DIAGNOSIS — G62.9 PERIPHERAL POLYNEUROPATHY: Primary | ICD-10-CM

## 2023-12-26 RX ORDER — GABAPENTIN 100 MG/1
100 CAPSULE ORAL 2 TIMES DAILY
COMMUNITY
End: 2023-12-26

## 2023-12-26 RX ORDER — GABAPENTIN 100 MG/1
100 CAPSULE ORAL 2 TIMES DAILY
Qty: 630 CAPSULE | Refills: 1 | Status: SHIPPED | OUTPATIENT
Start: 2023-12-26

## 2024-03-03 ENCOUNTER — APPOINTMENT (OUTPATIENT)
Dept: GENERAL RADIOLOGY | Age: 69
End: 2024-03-03
Payer: COMMERCIAL

## 2024-03-03 ENCOUNTER — HOSPITAL ENCOUNTER (OUTPATIENT)
Age: 69
Setting detail: OBSERVATION
Discharge: HOME | End: 2024-03-05
Attending: EMERGENCY MEDICINE | Admitting: INTERNAL MEDICINE
Payer: COMMERCIAL

## 2024-03-03 ENCOUNTER — APPOINTMENT (OUTPATIENT)
Dept: CT IMAGING | Age: 69
End: 2024-03-03
Payer: COMMERCIAL

## 2024-03-03 DIAGNOSIS — R41.82 ALTERED MENTAL STATUS, UNSPECIFIED ALTERED MENTAL STATUS TYPE: Primary | ICD-10-CM

## 2024-03-03 PROBLEM — R55 SYNCOPE AND COLLAPSE: Status: ACTIVE | Noted: 2024-03-03

## 2024-03-03 LAB
ALBUMIN SERPL-MCNC: 4.2 G/DL (ref 3.5–5.2)
ALP SERPL-CCNC: 84 U/L (ref 35–104)
ALT SERPL-CCNC: 22 U/L (ref 0–32)
ANION GAP SERPL CALCULATED.3IONS-SCNC: 10 MMOL/L (ref 7–16)
AST SERPL-CCNC: 40 U/L (ref 0–31)
BASOPHILS # BLD: 0.03 K/UL (ref 0–0.2)
BASOPHILS NFR BLD: 1 % (ref 0–2)
BILIRUB SERPL-MCNC: 0.8 MG/DL (ref 0–1.2)
BILIRUB UR QL STRIP: NEGATIVE
BUN SERPL-MCNC: 18 MG/DL (ref 6–23)
CALCIUM SERPL-MCNC: 9.7 MG/DL (ref 8.6–10.2)
CHLORIDE SERPL-SCNC: 100 MMOL/L (ref 98–107)
CLARITY UR: CLEAR
CO2 SERPL-SCNC: 27 MMOL/L (ref 22–29)
COLOR UR: YELLOW
CREAT SERPL-MCNC: 1.3 MG/DL (ref 0.5–1)
EOSINOPHIL # BLD: 0.03 K/UL (ref 0.05–0.5)
EOSINOPHILS RELATIVE PERCENT: 1 % (ref 0–6)
ERYTHROCYTE [DISTWIDTH] IN BLOOD BY AUTOMATED COUNT: 12.7 % (ref 11.5–15)
GFR SERPL CREATININE-BSD FRML MDRD: 45 ML/MIN/1.73M2
GLUCOSE SERPL-MCNC: 100 MG/DL (ref 74–99)
GLUCOSE UR STRIP-MCNC: NEGATIVE MG/DL
HCT VFR BLD AUTO: 41.1 % (ref 34–48)
HGB BLD-MCNC: 13.9 G/DL (ref 11.5–15.5)
HGB UR QL STRIP.AUTO: NEGATIVE
IMM GRANULOCYTES # BLD AUTO: <0.03 K/UL (ref 0–0.58)
IMM GRANULOCYTES NFR BLD: 0 % (ref 0–5)
KETONES UR STRIP-MCNC: ABNORMAL MG/DL
LEUKOCYTE ESTERASE UR QL STRIP: NEGATIVE
LYMPHOCYTES NFR BLD: 0.25 K/UL (ref 1.5–4)
LYMPHOCYTES RELATIVE PERCENT: 4 % (ref 20–42)
MAGNESIUM SERPL-MCNC: 2.1 MG/DL (ref 1.6–2.6)
MCH RBC QN AUTO: 31.1 PG (ref 26–35)
MCHC RBC AUTO-ENTMCNC: 33.8 G/DL (ref 32–34.5)
MCV RBC AUTO: 91.9 FL (ref 80–99.9)
MONOCYTES NFR BLD: 0.44 K/UL (ref 0.1–0.95)
MONOCYTES NFR BLD: 8 % (ref 2–12)
NEUTROPHILS NFR BLD: 87 % (ref 43–80)
NEUTS SEG NFR BLD: 4.99 K/UL (ref 1.8–7.3)
NITRITE UR QL STRIP: NEGATIVE
PH UR STRIP: 7 [PH] (ref 5–9)
PLATELET CONFIRMATION: NORMAL
PLATELET, FLUORESCENCE: 233 K/UL (ref 130–450)
PMV BLD AUTO: 8.9 FL (ref 7–12)
POTASSIUM SERPL-SCNC: 4.5 MMOL/L (ref 3.5–5)
PROT SERPL-MCNC: 6.9 G/DL (ref 6.4–8.3)
PROT UR STRIP-MCNC: NEGATIVE MG/DL
RBC # BLD AUTO: 4.47 M/UL (ref 3.5–5.5)
RBC # BLD: NORMAL 10*6/UL
RBC #/AREA URNS HPF: ABNORMAL /HPF
SODIUM SERPL-SCNC: 137 MMOL/L (ref 132–146)
SP GR UR STRIP: 1.01 (ref 1–1.03)
T4 FREE SERPL-MCNC: 1 NG/DL (ref 0.9–1.7)
TROPONIN I SERPL HS-MCNC: 15 NG/L (ref 0–9)
TROPONIN I SERPL HS-MCNC: 16 NG/L (ref 0–9)
TSH SERPL DL<=0.05 MIU/L-ACNC: 2.59 UIU/ML (ref 0.27–4.2)
UROBILINOGEN UR STRIP-ACNC: 0.2 EU/DL (ref 0–1)
WBC #/AREA URNS HPF: ABNORMAL /HPF
WBC OTHER # BLD: 5.8 K/UL (ref 4.5–11.5)

## 2024-03-03 PROCEDURE — 84439 ASSAY OF FREE THYROXINE: CPT

## 2024-03-03 PROCEDURE — 99285 EMERGENCY DEPT VISIT HI MDM: CPT

## 2024-03-03 PROCEDURE — G0378 HOSPITAL OBSERVATION PER HR: HCPCS

## 2024-03-03 PROCEDURE — 70450 CT HEAD/BRAIN W/O DYE: CPT

## 2024-03-03 PROCEDURE — 2580000003 HC RX 258: Performed by: INTERNAL MEDICINE

## 2024-03-03 PROCEDURE — 71045 X-RAY EXAM CHEST 1 VIEW: CPT

## 2024-03-03 PROCEDURE — 6370000000 HC RX 637 (ALT 250 FOR IP): Performed by: INTERNAL MEDICINE

## 2024-03-03 PROCEDURE — 6360000002 HC RX W HCPCS: Performed by: INTERNAL MEDICINE

## 2024-03-03 PROCEDURE — 83735 ASSAY OF MAGNESIUM: CPT

## 2024-03-03 PROCEDURE — 96372 THER/PROPH/DIAG INJ SC/IM: CPT

## 2024-03-03 PROCEDURE — 84443 ASSAY THYROID STIM HORMONE: CPT

## 2024-03-03 PROCEDURE — 87086 URINE CULTURE/COLONY COUNT: CPT

## 2024-03-03 PROCEDURE — 84484 ASSAY OF TROPONIN QUANT: CPT

## 2024-03-03 PROCEDURE — 93005 ELECTROCARDIOGRAM TRACING: CPT

## 2024-03-03 PROCEDURE — 81001 URINALYSIS AUTO W/SCOPE: CPT

## 2024-03-03 PROCEDURE — 85025 COMPLETE CBC W/AUTO DIFF WBC: CPT

## 2024-03-03 PROCEDURE — 80053 COMPREHEN METABOLIC PANEL: CPT

## 2024-03-03 PROCEDURE — P9612 CATHETERIZE FOR URINE SPEC: HCPCS

## 2024-03-03 PROCEDURE — 99222 1ST HOSP IP/OBS MODERATE 55: CPT | Performed by: INTERNAL MEDICINE

## 2024-03-03 RX ORDER — ACETAMINOPHEN 325 MG/1
650 TABLET ORAL EVERY 6 HOURS PRN
Status: DISCONTINUED | OUTPATIENT
Start: 2024-03-03 | End: 2024-03-05 | Stop reason: HOSPADM

## 2024-03-03 RX ORDER — FUROSEMIDE 20 MG/1
20 TABLET ORAL WEEKLY
COMMUNITY

## 2024-03-03 RX ORDER — MAGNESIUM SULFATE IN WATER 40 MG/ML
2000 INJECTION, SOLUTION INTRAVENOUS PRN
Status: DISCONTINUED | OUTPATIENT
Start: 2024-03-03 | End: 2024-03-05 | Stop reason: HOSPADM

## 2024-03-03 RX ORDER — GABAPENTIN 400 MG/1
400 CAPSULE ORAL NIGHTLY
Status: DISCONTINUED | OUTPATIENT
Start: 2024-03-03 | End: 2024-03-05 | Stop reason: HOSPADM

## 2024-03-03 RX ORDER — DONEPEZIL HYDROCHLORIDE 5 MG/1
5 TABLET, FILM COATED ORAL NIGHTLY
COMMUNITY

## 2024-03-03 RX ORDER — ONDANSETRON 2 MG/ML
4 INJECTION INTRAMUSCULAR; INTRAVENOUS EVERY 6 HOURS PRN
Status: DISCONTINUED | OUTPATIENT
Start: 2024-03-03 | End: 2024-03-05 | Stop reason: HOSPADM

## 2024-03-03 RX ORDER — POLYETHYLENE GLYCOL 3350 17 G/17G
17 POWDER, FOR SOLUTION ORAL DAILY PRN
Status: DISCONTINUED | OUTPATIENT
Start: 2024-03-03 | End: 2024-03-05 | Stop reason: HOSPADM

## 2024-03-03 RX ORDER — POTASSIUM CHLORIDE 7.45 MG/ML
10 INJECTION INTRAVENOUS PRN
Status: DISCONTINUED | OUTPATIENT
Start: 2024-03-03 | End: 2024-03-05 | Stop reason: HOSPADM

## 2024-03-03 RX ORDER — SODIUM CHLORIDE 0.9 % (FLUSH) 0.9 %
5-40 SYRINGE (ML) INJECTION EVERY 12 HOURS SCHEDULED
Status: DISCONTINUED | OUTPATIENT
Start: 2024-03-03 | End: 2024-03-05 | Stop reason: HOSPADM

## 2024-03-03 RX ORDER — ENOXAPARIN SODIUM 100 MG/ML
40 INJECTION SUBCUTANEOUS DAILY
Status: DISCONTINUED | OUTPATIENT
Start: 2024-03-03 | End: 2024-03-05 | Stop reason: HOSPADM

## 2024-03-03 RX ORDER — POTASSIUM CHLORIDE 20 MEQ/1
40 TABLET, EXTENDED RELEASE ORAL PRN
Status: DISCONTINUED | OUTPATIENT
Start: 2024-03-03 | End: 2024-03-05 | Stop reason: HOSPADM

## 2024-03-03 RX ORDER — SODIUM CHLORIDE 9 MG/ML
INJECTION, SOLUTION INTRAVENOUS PRN
Status: DISCONTINUED | OUTPATIENT
Start: 2024-03-03 | End: 2024-03-05 | Stop reason: HOSPADM

## 2024-03-03 RX ORDER — METOPROLOL SUCCINATE 25 MG/1
25 TABLET, EXTENDED RELEASE ORAL DAILY
Status: DISCONTINUED | OUTPATIENT
Start: 2024-03-03 | End: 2024-03-05 | Stop reason: HOSPADM

## 2024-03-03 RX ORDER — ONDANSETRON 4 MG/1
4 TABLET, ORALLY DISINTEGRATING ORAL EVERY 8 HOURS PRN
Status: DISCONTINUED | OUTPATIENT
Start: 2024-03-03 | End: 2024-03-05 | Stop reason: HOSPADM

## 2024-03-03 RX ORDER — GABAPENTIN 300 MG/1
300 CAPSULE ORAL DAILY
Status: DISCONTINUED | OUTPATIENT
Start: 2024-03-03 | End: 2024-03-05 | Stop reason: HOSPADM

## 2024-03-03 RX ORDER — SODIUM CHLORIDE 0.9 % (FLUSH) 0.9 %
5-40 SYRINGE (ML) INJECTION PRN
Status: DISCONTINUED | OUTPATIENT
Start: 2024-03-03 | End: 2024-03-05 | Stop reason: HOSPADM

## 2024-03-03 RX ORDER — ACETAMINOPHEN 650 MG/1
650 SUPPOSITORY RECTAL EVERY 6 HOURS PRN
Status: DISCONTINUED | OUTPATIENT
Start: 2024-03-03 | End: 2024-03-05 | Stop reason: HOSPADM

## 2024-03-03 RX ORDER — FUROSEMIDE 20 MG/1
20 TABLET ORAL WEEKLY
Status: DISCONTINUED | OUTPATIENT
Start: 2024-03-09 | End: 2024-03-05 | Stop reason: HOSPADM

## 2024-03-03 RX ORDER — DONEPEZIL HYDROCHLORIDE 5 MG/1
5 TABLET, FILM COATED ORAL NIGHTLY
Status: DISCONTINUED | OUTPATIENT
Start: 2024-03-03 | End: 2024-03-05 | Stop reason: HOSPADM

## 2024-03-03 RX ORDER — SPIRONOLACTONE 25 MG/1
12.5 TABLET ORAL DAILY
Status: DISCONTINUED | OUTPATIENT
Start: 2024-03-03 | End: 2024-03-05 | Stop reason: HOSPADM

## 2024-03-03 RX ORDER — SPIRONOLACTONE 25 MG/1
12.5 TABLET ORAL DAILY
COMMUNITY

## 2024-03-03 RX ADMIN — ACETAMINOPHEN 650 MG: 325 TABLET ORAL at 15:43

## 2024-03-03 RX ADMIN — METOPROLOL SUCCINATE 25 MG: 25 TABLET, EXTENDED RELEASE ORAL at 15:42

## 2024-03-03 RX ADMIN — DONEPEZIL HYDROCHLORIDE 5 MG: 5 TABLET, FILM COATED ORAL at 20:11

## 2024-03-03 RX ADMIN — SACUBITRIL AND VALSARTAN 1 TABLET: 24; 26 TABLET, FILM COATED ORAL at 20:11

## 2024-03-03 RX ADMIN — SODIUM CHLORIDE, PRESERVATIVE FREE 10 ML: 5 INJECTION INTRAVENOUS at 20:12

## 2024-03-03 RX ADMIN — SPIRONOLACTONE 12.5 MG: 25 TABLET ORAL at 18:28

## 2024-03-03 RX ADMIN — GABAPENTIN 400 MG: 400 CAPSULE ORAL at 20:11

## 2024-03-03 RX ADMIN — GABAPENTIN 300 MG: 300 CAPSULE ORAL at 15:42

## 2024-03-03 RX ADMIN — ENOXAPARIN SODIUM 40 MG: 100 INJECTION SUBCUTANEOUS at 15:42

## 2024-03-03 ASSESSMENT — PAIN SCALES - GENERAL: PAINLEVEL_OUTOF10: 8

## 2024-03-03 ASSESSMENT — PAIN DESCRIPTION - LOCATION: LOCATION: HEAD

## 2024-03-03 NOTE — H&P
no carotid bruits  Abdomen: soft, non-tender, non-distended, normal bowel sounds, no masses or organomegaly  Extremities: no cyanosis, no clubbing and no edema  Neurologic: no cranial nerve deficit and speech normal        LABS:  Recent Labs     03/03/24  1015      K 4.5      CO2 27   BUN 18   CREATININE 1.3*   GLUCOSE 100*   CALCIUM 9.7       Recent Labs     03/03/24  0910   WBC 5.8   RBC 4.47   HGB 13.9   HCT 41.1   MCV 91.9   MCH 31.1   MCHC 33.8   RDW 12.7   MPV 8.9       No results for input(s): \"POCGLU\" in the last 72 hours.        Radiology:   CT Head W/O Contrast   Final Result   Atrophy and chronic changes seen within the brain with no acute intracranial   abnormality.         XR CHEST PORTABLE   Final Result   Chronic changes seen throughout the lung fields bilaterally with no acute   cardiopulmonary process.                 ASSESSMENT:      Principal Problem:    Syncope and collapse  Resolved Problems:    * No resolved hospital problems. *      PLAN:    1.  Syncopal episode with prolonged headache after recovery: May be due to postictal, observation telemetry, echocardiography, lipid panel, neurology consult.  2.  CHF due to cardiomyopathy: Continue Entresto, beta-blocker and Lasix  3.  Dementia: Continue donepezil  4.    Code Status: Full  DVT prophylaxis: Lovenox subcutaneous      NOTE: This report was transcribed using voice recognition software. Every effort was made to ensure accuracy; however, inadvertent computerized transcription errors may be present.  Electronically signed by ALBERTO MCCOY MD on 3/3/2024 at 6:51 PM

## 2024-03-03 NOTE — ED NOTES
Dietary called for dinner tray   Quality 47: Advance Care Plan: Advance Care Planning discussed and documented; advance care plan or surrogate decision maker documented in the medical record. Quality 130: Documentation Of Current Medications In The Medical Record: Current Medications Documented Quality 110: Preventive Care And Screening: Influenza Immunization: Influenza Immunization previously received during influenza season Name And Contact Information For Health Care Proxy: Wife - Rachel VIRGEN’Jimi - [phone] Quality 111:Pneumonia Vaccination Status For Older Adults: Pneumococcal Vaccination Previously Received Quality 226: Preventive Care And Screening: Tobacco Use: Screening And Cessation Intervention: Patient screened for tobacco and never smoked Detail Level: Detailed Quality 431: Preventive Care And Screening: Unhealthy Alcohol Use - Screening: Patient screened for unhealthy alcohol use using a single question and scores less than 2 times per year

## 2024-03-03 NOTE — ED PROVIDER NOTES
distended. No rebound, guarding, or rigidity.   Musculoskeletal: Moves all extremities x 4. Warm and well perfused, no clubbing, no cyanosis, no edema. Capillary refill <3 seconds  Integument: skin warm and dry. No rashes.   Neurologic: no focal deficits, CN II-XII grossly intact.   Psychiatric: Flat affect            DIAGNOSTIC RESULTS   LABS:    Labs Reviewed   CBC WITH AUTO DIFFERENTIAL - Abnormal; Notable for the following components:       Result Value    Neutrophils % 87 (*)     Lymphocytes % 4 (*)     Lymphocytes Absolute 0.25 (*)     Eosinophils Absolute 0.03 (*)     All other components within normal limits   URINALYSIS WITH MICROSCOPIC - Abnormal; Notable for the following components:    Ketones, Urine TRACE (*)     All other components within normal limits   COMPREHENSIVE METABOLIC PANEL W/ REFLEX TO MG FOR LOW K - Abnormal; Notable for the following components:    Glucose 100 (*)     Creatinine 1.3 (*)     Est, Glom Filt Rate 45 (*)     AST 40 (*)     All other components within normal limits   TROPONIN - Abnormal; Notable for the following components:    Troponin, High Sensitivity 15 (*)     All other components within normal limits   TROPONIN - Abnormal; Notable for the following components:    Troponin, High Sensitivity 16 (*)     All other components within normal limits   CULTURE, URINE   MAGNESIUM   TSH   T4, FREE   PLATELET CONFIRMATION   POCT GLUCOSE       As interpreted by me, the above displayed labs are abnormal. All other labs obtained during this visit were within normal range or not returned as of this dictation.      EKG Interpretation  Interpreted by emergency department physician, Luis F Granados DO      EKG:  This EKG is signed and interpreted by me.    Rate: 65  Rhythm: Sinus  Interpretation: Left axis deviation.  Right bundle branch block with left anterior fascicular block.  T wave inversions laterally and inferiorly.  Comparison: changes compared to previous EKG, T wave changes

## 2024-03-03 NOTE — ED NOTES
Per  and daughter at bedside:    Pt was sitting up in bed and had leg cramp. Pt leaned over to grab calf and  rolled out and stopped responding to her . Pt eyes closed then was staring up at ceiling. Pt was gurgling and flaccid, loss of bladder. Pt began talking again when arriving to hospital. Pt does not remember events prior to hospital. Pt now alert and oriented x4.

## 2024-03-04 LAB
ANION GAP SERPL CALCULATED.3IONS-SCNC: 15 MMOL/L (ref 7–16)
BUN SERPL-MCNC: 19 MG/DL (ref 6–23)
CALCIUM SERPL-MCNC: 9.4 MG/DL (ref 8.6–10.2)
CHLORIDE SERPL-SCNC: 103 MMOL/L (ref 98–107)
CO2 SERPL-SCNC: 22 MMOL/L (ref 22–29)
CREAT SERPL-MCNC: 1.3 MG/DL (ref 0.5–1)
EKG ATRIAL RATE: 65 BPM
EKG P AXIS: 67 DEGREES
EKG P-R INTERVAL: 166 MS
EKG Q-T INTERVAL: 488 MS
EKG QRS DURATION: 150 MS
EKG QTC CALCULATION (BAZETT): 507 MS
EKG R AXIS: -68 DEGREES
EKG T AXIS: -63 DEGREES
EKG VENTRICULAR RATE: 65 BPM
GFR SERPL CREATININE-BSD FRML MDRD: 47 ML/MIN/1.73M2
GLUCOSE SERPL-MCNC: 90 MG/DL (ref 74–99)
MICROORGANISM SPEC CULT: NO GROWTH
POTASSIUM SERPL-SCNC: 3.8 MMOL/L (ref 3.5–5)
SODIUM SERPL-SCNC: 140 MMOL/L (ref 132–146)
SPECIMEN DESCRIPTION: NORMAL

## 2024-03-04 PROCEDURE — G0378 HOSPITAL OBSERVATION PER HR: HCPCS

## 2024-03-04 PROCEDURE — 96372 THER/PROPH/DIAG INJ SC/IM: CPT

## 2024-03-04 PROCEDURE — 2580000003 HC RX 258: Performed by: INTERNAL MEDICINE

## 2024-03-04 PROCEDURE — 36415 COLL VENOUS BLD VENIPUNCTURE: CPT

## 2024-03-04 PROCEDURE — 6360000002 HC RX W HCPCS: Performed by: INTERNAL MEDICINE

## 2024-03-04 PROCEDURE — 6370000000 HC RX 637 (ALT 250 FOR IP): Performed by: INTERNAL MEDICINE

## 2024-03-04 PROCEDURE — 99222 1ST HOSP IP/OBS MODERATE 55: CPT | Performed by: PSYCHIATRY & NEUROLOGY

## 2024-03-04 PROCEDURE — 80048 BASIC METABOLIC PNL TOTAL CA: CPT

## 2024-03-04 RX ADMIN — GABAPENTIN 400 MG: 400 CAPSULE ORAL at 22:01

## 2024-03-04 RX ADMIN — SPIRONOLACTONE 12.5 MG: 25 TABLET ORAL at 08:22

## 2024-03-04 RX ADMIN — ENOXAPARIN SODIUM 40 MG: 100 INJECTION SUBCUTANEOUS at 08:23

## 2024-03-04 RX ADMIN — DONEPEZIL HYDROCHLORIDE 5 MG: 5 TABLET, FILM COATED ORAL at 22:01

## 2024-03-04 RX ADMIN — ACETAMINOPHEN 650 MG: 325 TABLET ORAL at 08:22

## 2024-03-04 RX ADMIN — GABAPENTIN 300 MG: 300 CAPSULE ORAL at 08:22

## 2024-03-04 RX ADMIN — SACUBITRIL AND VALSARTAN 1 TABLET: 24; 26 TABLET, FILM COATED ORAL at 22:01

## 2024-03-04 RX ADMIN — METOPROLOL SUCCINATE 25 MG: 25 TABLET, EXTENDED RELEASE ORAL at 08:22

## 2024-03-04 RX ADMIN — SACUBITRIL AND VALSARTAN 1 TABLET: 24; 26 TABLET, FILM COATED ORAL at 08:22

## 2024-03-04 RX ADMIN — SODIUM CHLORIDE, PRESERVATIVE FREE 10 ML: 5 INJECTION INTRAVENOUS at 08:23

## 2024-03-04 ASSESSMENT — PAIN SCALES - GENERAL
PAINLEVEL_OUTOF10: 3
PAINLEVEL_OUTOF10: 0

## 2024-03-04 ASSESSMENT — PAIN - FUNCTIONAL ASSESSMENT: PAIN_FUNCTIONAL_ASSESSMENT: ACTIVITIES ARE NOT PREVENTED

## 2024-03-04 ASSESSMENT — PAIN DESCRIPTION - LOCATION: LOCATION: HEAD

## 2024-03-04 ASSESSMENT — PAIN DESCRIPTION - DESCRIPTORS: DESCRIPTORS: ACHING

## 2024-03-04 NOTE — CARE COORDINATION
Patient presented to Ed with altered mental status, became unresponsive at home for approximately 30 minutes.  H/O dementia.   Placed in observation  for syncopal episode with headache post episode. Echo and neurology consult ordered. CR 1.3 Met with patient at bedside. Patient's PCP is Dr. Michele and pharmacy is [facility] in [place]. Patient lives with her  in a 2 story home with 3 [address]. Patient does not use any DME or have a history of HHC/GENTRY. Plan is for patient to return home on discharge when medically stable and her , Anam will provide transportation.Cm/SW will continue to follow

## 2024-03-04 NOTE — CONSULTS
[facility]  Neuro Inpatient Consult        Karlie Vaca is a 68 y.o. right handed female    Neurology was consulted for post ictal seizure    Past Medical History:     Past Medical History:   Diagnosis Date    Acute leukemia (HCC)     Breast cancer (HCC)     right    Cancer (HCC) 2004    right breast    History of blood transfusion     Hypertension        Past Surgical History:     Past Surgical History:   Procedure Laterality Date    ANKLE SURGERY Left     ORIF    BREAST LUMPECTOMY      right side 2004.also had chemo & radiation    BREAST LUMPECTOMY Right     CHOLECYSTECTOMY, LAPAROSCOPIC N/A 12/6/2018    CHOLECYSTECTOMY LAPAROSCOPIC performed by Ravi Bennett MD at [facility] OR    DILATION AND CURETTAGE OF UTERUS      OTHER SURGICAL HISTORY Left 11/29/2016    left hand middle finger ganglion removal    TUBAL LIGATION         Allergies:     Penicillins and Vancomycin    Medications:     Prior to Admission medications    Medication Sig Start Date End Date Taking? Authorizing Provider   donepezil (ARICEPT) 5 MG tablet Take 1 tablet by mouth nightly   Yes Rachel Torres MD   spironolactone (ALDACTONE) 25 MG tablet Take 0.5 tablets by mouth daily   Yes Rachel Torres MD   furosemide (LASIX) 20 MG tablet Take 1 tablet by mouth once a week Saturday   Yes Rachel Torres MD   potassium chloride (MICRO-K) 10 MEQ extended release capsule Take 1 capsule by mouth once a week Saturday    ProviderRachel MD   pimecrolimus (ELIDEL) 1 % cream Apply topically    Rachel Torres MD   calcipotriene (DOVONEX) 0.005 % cream Apply topically    ProviderRachel MD   vitamin D 25 MCG (1000 UT) CAPS Take 1 capsule by mouth daily    ProviderRachel MD   metoprolol succinate (TOPROL XL) 25 MG extended release tablet Take 1 tablet by mouth daily 5/23/20   Naya Michele DO   sacubitril-valsartan (ENTRESTO) 24-26 MG per tablet Take 1 tablet by mouth 2 times daily

## 2024-03-04 NOTE — ACP (ADVANCE CARE PLANNING)
Advance Care Planning   Healthcare Decision Maker:    Primary Decision Maker: Darwin Vaca - Spouse - [phone]    Secondary Decision Maker: Jennifer Marsh - Child - [phone]      Today we documented Decision Maker(s) consistent with Legal Next of Kin hierarchy.

## 2024-03-05 ENCOUNTER — APPOINTMENT (OUTPATIENT)
Dept: NEUROLOGY | Age: 69
End: 2024-03-05
Payer: COMMERCIAL

## 2024-03-05 ENCOUNTER — APPOINTMENT (OUTPATIENT)
Dept: MRI IMAGING | Age: 69
End: 2024-03-05
Payer: COMMERCIAL

## 2024-03-05 VITALS
TEMPERATURE: 98 F | RESPIRATION RATE: 16 BRPM | SYSTOLIC BLOOD PRESSURE: 118 MMHG | HEART RATE: 76 BPM | OXYGEN SATURATION: 98 % | BODY MASS INDEX: 26.33 KG/M2 | HEIGHT: 68 IN | WEIGHT: 173.72 LBS | DIASTOLIC BLOOD PRESSURE: 66 MMHG

## 2024-03-05 PROBLEM — R56.9 SEIZURE-LIKE ACTIVITY (HCC): Status: ACTIVE | Noted: 2024-03-05

## 2024-03-05 PROBLEM — R41.82 ALTERED MENTAL STATUS: Status: ACTIVE | Noted: 2024-03-05

## 2024-03-05 PROCEDURE — 99222 1ST HOSP IP/OBS MODERATE 55: CPT | Performed by: PHYSICIAN ASSISTANT

## 2024-03-05 PROCEDURE — 6370000000 HC RX 637 (ALT 250 FOR IP): Performed by: INTERNAL MEDICINE

## 2024-03-05 PROCEDURE — 95816 EEG AWAKE AND DROWSY: CPT | Performed by: PSYCHIATRY & NEUROLOGY

## 2024-03-05 PROCEDURE — G0378 HOSPITAL OBSERVATION PER HR: HCPCS

## 2024-03-05 PROCEDURE — 70551 MRI BRAIN STEM W/O DYE: CPT

## 2024-03-05 PROCEDURE — 99239 HOSP IP/OBS DSCHRG MGMT >30: CPT | Performed by: FAMILY MEDICINE

## 2024-03-05 PROCEDURE — 6360000002 HC RX W HCPCS: Performed by: INTERNAL MEDICINE

## 2024-03-05 PROCEDURE — 96372 THER/PROPH/DIAG INJ SC/IM: CPT

## 2024-03-05 PROCEDURE — 95816 EEG AWAKE AND DROWSY: CPT

## 2024-03-05 PROCEDURE — 97165 OT EVAL LOW COMPLEX 30 MIN: CPT

## 2024-03-05 RX ADMIN — ENOXAPARIN SODIUM 40 MG: 100 INJECTION SUBCUTANEOUS at 08:19

## 2024-03-05 RX ADMIN — SACUBITRIL AND VALSARTAN 1 TABLET: 24; 26 TABLET, FILM COATED ORAL at 08:20

## 2024-03-05 RX ADMIN — GABAPENTIN 300 MG: 300 CAPSULE ORAL at 08:20

## 2024-03-05 RX ADMIN — METOPROLOL SUCCINATE 25 MG: 25 TABLET, EXTENDED RELEASE ORAL at 08:20

## 2024-03-05 RX ADMIN — SPIRONOLACTONE 12.5 MG: 25 TABLET ORAL at 08:19

## 2024-03-05 NOTE — DISCHARGE INSTRUCTIONS
SEIZURE PRECAUTIONS     Please follow seizure precautions:  Please do not engage in any high risk activities such as, but not limited to, driving, bathing in tubs, swimming alone, climbing ladders, working at heights, near open flames or machinery with moving parts etc.  For patients with epilepsy it is recommended to stay seizure free for 6 months on medication before resume driving. Your neurology provider will advise when it is ok for you to drive. Follow-up 2-3 months

## 2024-03-05 NOTE — PROGRESS NOTES
Hospitalist Progress Note      SYNOPSIS: Patient admitted on 3/3/2024 for Syncope and collapse      SUBJECTIVE:    Patient seen and examined.   at bedside mentioned she has no history of seizures.  Patient does not have any history of seizures.  She does not recall the event about passing out or even happen after.  Discussed results MRI of the brain was negative and awaiting EEG per neurology.  Records reviewed.     58-year-old lady past medical history of breast cancer on chemotherapy, cardiomyopathy, hypertension, leukemia status post bone marrow transplant presented due to unresponsiveness.  Concern for seizure as she was responsive and also reported urinary continence with shaking activity per 's report.  CT head was negative for any acute findings EEG has been ordered.  MRI negative for any acute findings.  Neurology consulted.    Stable overnight. No other overnight issues reported.   Temp (24hrs), Av °F (36.7 °C), Min:97.3 °F (36.3 °C), Max:98.8 °F (37.1 °C)    DIET: ADULT DIET; Regular  CODE: Full Code    Intake/Output Summary (Last 24 hours) at 3/5/2024 1250  Last data filed at 3/5/2024 0848  Gross per 24 hour   Intake 480 ml   Output --   Net 480 ml       OBJECTIVE:    /77   Pulse 62   Temp 97.4 °F (36.3 °C) (Temporal)   Resp 16   Ht 1.72 m (5' 7.72\")   Wt 78.8 kg (173 lb 11.6 oz)   SpO2 98%   BMI 26.64 kg/m²     General appearance: No apparent distress, appears stated age and cooperative.  HEENT:  Conjunctivae/corneas clear.   Neck: Supple. No jugular venous distention.   Respiratory: Clear to auscultation bilaterally, normal respiratory effort  Cardiovascular: Regular rate rhythm, normal S1-S2  Abdomen: Soft, nontender, nondistended  Musculoskeletal: No clubbing, cyanosis, no bilateral lower extremity edema. Brisk capillary refill.   Skin:  No rashes  on visible skin  Neurologic: awake, alert and following commands     ASSESSMENT:  Syncope  Probable 
 Left arm PIV found pulled out and on the floor. Pt does not recall removing it. Pt refusing to have another replaced. RN educated on importance of having IV access while in the hospital in case of emergency. Pt states she will think about it and let RN know in the morning.   
4 Eyes Skin Assessment     NAME:  Karlie Vaca  YOB: 1955  MEDICAL RECORD NUMBER:  [id number]    The patient is being assessed for  Admission    I agree that at least one RN has performed a thorough Head to Toe Skin Assessment on the patient. ALL assessment sites listed below have been assessed.      Areas assessed by both nurses:    Head, Face, Ears, Shoulders, Back, Chest, Arms, Elbows, Hands, Sacrum. Buttock, Coccyx, Ischium, Legs. Feet and Heels, and Under Medical Devices         Does the Patient have a Wound? No noted wound(s)       Saad Prevention initiated by RN: Yes  Wound Care Orders initiated by RN: No    Pressure Injury (Stage 3,4, Unstageable, DTI, NWPT, and Complex wounds) if present, place Wound referral order by RN under : No    New Ostomies, if present place, Ostomy referral order under : No     Nurse 1 eSignature: Electronically signed by Anisa Jones RN on 3/3/24 at 6:53 PM EST    **SHARE this note so that the co-signing nurse can place an eSignature**    Nurse 2 eSignature: {Esignature:[id number]}   
EEG completed. Report to follow.  Mikala Thompson 3/5/2024     
MRI screening needs completed, thank you.  
Neurology consulted, Dr Urena.  
Occupational Therapy  Facility/Department: [address]  Occupational Therapy Initial Assessment    Name: Karlie Vaca  : 1955  MRN: [id number]  Date of Service: 3/5/2024  Room: 8208B    Evaluating OT: Kari Ochoa OTR/L [id number]  Referring Provider: MD Gautam  Specific Provider Orders: 3/5/24  Recommended Adaptive Equipment: tub seat     Diagnosis: AMS, syncope, seizure activity per   Pertinent Medical History:  hx breast cancer, recent chemo, HTN, cardiomyopathy, hx L ankle surgery  Precautions:  Fall Risk, seizures     Assessment of current deficits   [x] Functional mobility  [x]ADLs  [] Strength               [x]Cognition   [x] Functional transfers   [x] IADLs         [x] Safety Awareness   [x]Endurance   [] Fine Coordination              [x] Balance      [] Vision/perception   []Sensation    []Gross Motor Coordination  [] ROM  [] Delirium                   [] Motor Control     OT PLAN OF CARE   OT POC based on physician orders, patient diagnosis and results of clinical assessment    Frequency/Duration:  1-3 days/wk for 1 week PRN   Specific OT Treatment to include:   * Instruction/training on adapted ADL techniques and AE recommendations to increase functional independence within precautions       * Training on energy conservation strategies, correct breathing pattern and techniques to improve independence/tolerance for self-care routine  * Functional transfer/mobility training/DME recommendations for increased independence, safety, and fall prevention  * Patient/Family education to increase follow through with safety techniques and functional independence  * Recommendation of environmental modifications for increased safety with functional transfers/mobility and ADLs  * Cognitive retraining/development of therapeutic activities to improve problem solving, judgement, memory, and attention for increased safety/participation in ADL/IADL tasks  * Therapeutic exercise to improve motor endurance, ROM, and 
acute care setting.    Patient response to education:   Pt verbalized understanding Pt demonstrated skill Pt requires further education in this area   Yes NA No     ASSESSMENT:    Conditions Requiring Skilled Therapeutic Intervention:    []Decreased strength     []Decreased ROM  [x]Decreased functional mobility  [x]Decreased balance   []Decreased endurance   []Decreased posture  []Decreased sensation  []Decreased coordination   []Decreased vision  [x]Decreased safety awareness   []Increased pain       Comments:    Pt was in bed upon room entry; agreeable to PT evaluation.  present. Pt completed all mobility noted above. Pt is confused but  states she has impaired STM at baseline. Pt ambulated in hallway. Gait was steady. Pt ambulated back to room. All questions and concerns were addressed. Pt was left in bed with all needs met at conclusion of session.    Treatment:  Patient practiced and was instructed in the following treatment:    Therapeutic activities:  Ambulation: Pt was cued for safety when ambulating in hallway.    Pt's/family goals:  1. To return home.    Prognosis is Good for reaching above PT goals.    Patient and or family understand(s) diagnosis, prognosis, and plan of care.  Yes    PHYSICAL THERAPY PLAN OF CARE:    PT POC is established based on physician order and patient diagnosis     Referring provider/PT Order:    Start   Ordering Provider    03/05/24 1100  PT eval and treat  Start:  03/05/24 1100,   End:  03/05/24 1100,   ONE TIME,   Standing Count:  1 Occurrences,   R         Kingsley Avery MD      Diagnosis:  Syncope and collapse [R55]  Altered mental status, unspecified altered mental status type [R41.82]  Specific instructions for next treatment:  Increase ambulation distance    Current Treatment Recommendations:     [] Strengthening to improve independence with functional mobility   [] ROM to improve independence with functional mobility   [x] Balance Training to improve 
sacubitril-valsartan  1 tablet Oral BID    sodium chloride flush  5-40 mL IntraVENous 2 times per day    enoxaparin  40 mg SubCUTAneous Daily    gabapentin  400 mg Oral Nightly    donepezil  5 mg Oral Nightly    [START ON 3/9/2024] furosemide  20 mg Oral Weekly    spironolactone  12.5 mg Oral Daily     PRN Meds: sodium chloride flush, sodium chloride, potassium chloride **OR** potassium alternative oral replacement **OR** potassium chloride, magnesium sulfate, ondansetron **OR** ondansetron, polyethylene glycol, acetaminophen **OR** acetaminophen    I/O    Intake/Output Summary (Last 24 hours) at 3/4/2024 1354  Last data filed at 3/4/2024 1248  Gross per 24 hour   Intake 360 ml   Output 700 ml   Net -340 ml       Labs:   Recent Labs     03/03/24  0910   WBC 5.8   HGB 13.9   HCT 41.1       Recent Labs     03/03/24  1015 03/04/24  0646    140   K 4.5 3.8    103   CO2 27 22   BUN 18 19   CREATININE 1.3* 1.3*   CALCIUM 9.7 9.4       Recent Labs     03/03/24  1015   PROT 6.9   ALKPHOS 84   ALT 22   AST 40*   BILITOT 0.8       No results for input(s): \"INR\" in the last 72 hours.    No results for input(s): \"CKTOTAL\", \"TROPONINI\" in the last 72 hours.    Chronic labs:  Lab Results   Component Value Date    CHOL 204 (H) 06/27/2023    TRIG 151 (H) 06/27/2023    HDL 56 06/27/2023    LDLCALC 118 (H) 06/27/2023    TSH 2.59 03/03/2024    INR 1.0 01/05/2022       Radiology:  Imaging studies reviewed today.    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 
AM    AST 40 03/03/2024 10:15 AM    ALT 22 03/03/2024 10:15 AM     CT Head w/o contrast:  IMPRESSION:  Atrophy and chronic changes seen within the brain with no acute intracranial  Abnormality    MRI brain without contrast:  IMPRESSION:  1. No acute intracranial abnormality.  No acute infarct.  2. Mild global parenchymal volume loss with chronic microvascular ischemic  changes.     EEG:  Clinical Interpretation:   This was a normal study during waking and drowsiness.  No seizures or epileptiform discharges were noted during this study.     All pertinent labs and images personally reviewed today    Assessment:     Seizure Like activity:  Patient has risk factors for seizures including dementia.  EEG did not demonstrate any seizures or epileptiform discharges but this does not completely rule out a seizure disorder.      Plan:     Follow up with neurology follow up in 2-3 months    SEIZURE PRECAUTIONS     Please follow seizure precautions:  Please do not engage in any high risk activities such as, but not limited to, driving, bathing in tubs, swimming alone, climbing ladders, working at heights, near open flames or machinery with moving parts etc.  For patients with epilepsy it is recommended to stay seizure free for 6 months on medication before resume driving. Your neurology provider will advise when it is ok for you to drive.     Neuro to sign off    Yesi Morales PA-C,   3:23 PM  3/5/2024

## 2024-03-05 NOTE — DISCHARGE SUMMARY
Hospitalist Discharge Summary    Patient ID: Karlie Vaca   Patient : 1955  Patient's PCP: Naya Michele DO    Admit Date: 3/3/2024   Admitting Physician: Jordi Ko MD    Discharge Date:  3/5/2024  Discharge Physician: Kingsley Avery MD   Discharge Condition: Stable  Discharge Disposition: Home    History of presenting illness:  58-year-old lady past medical history of breast cancer on chemotherapy, cardiomyopathy, hypertension, leukemia status post bone marrow transplant presented due to unresponsiveness. Concern for seizure as she was responsive and also reported urinary continence with shaking activity per 's report. CT head was negative for any acute findings EEG has been ordered. MRI negative for any acute findings. Neurology consulted and EEG was negative. She is to follow-up outpatient with neurology.     Hospital course in brief:  (Please refer to daily progress notes for a comprehensive review of the hospitalization by requesting medical records)  As above    Consults:   IP CONSULT TO INTERNAL MEDICINE    Discharge Diagnoses:  Syncope  Seizure like activity  Cardiomyopathy  CKD  Breast cancer status postchemotherapy  Leukemia status post bone transplant    Discharge Instructions / Follow up:    Continued appropriate risk factor modification of blood pressure, diabetes and serum lipids will remain essential to reducing risk of future atherosclerotic development    Activity: activity as tolerated    Significant labs:  CBC:   Recent Labs     24  0910   WBC 5.8   RBC 4.47   HGB 13.9   HCT 41.1   MCV 91.9   RDW 12.7     BMP:   Recent Labs     24  0910 24  1015 24  0646   NA  --  137 140   K  --  4.5 3.8   CL  --  100 103   CO2  --  27 22   BUN  --  18 19   CREATININE  --  1.3* 1.3*   MG 2.1  --   --      LFT:  Recent Labs     24  1015   PROT 6.9   ALKPHOS 84   ALT 22   AST 40*   BILITOT 0.8     PT/INR: No results for input(s): \"INR\", \"APTT\" in the last

## 2024-03-05 NOTE — CARE COORDINATION
Patient remains hospitalized with syncope and collapse. EEG and MRI completed. Neurology consulted for post ictal seizure. PT/OT to see. At this time, . Plan is for patient to return home on discharge when medically stable and her , Anam will provide transportation.Cm/SW will continue to follow  MR

## 2024-03-05 NOTE — PROCEDURES
[facility] Neurodiagnostic Report    MRN: [id number]  PATIENT NAME: Karlie Vaca  DATE OF REPORT: 3/5/2024    DATE OF SERVICE: 3/5/2024  PHYSICIAN NAME: Sherif Beck DO  Referring Physician: Yesi Miller      Patient's : 1955  Patient's Age: 68 y.o.  Gender: female    PROCEDURE: Routine EEG with video      Clinical Interpretation:   This was a normal study during waking and drowsiness.    No seizures or epileptiform discharges were noted during this study.      ____________________________  Electronically signed by: Sherif Beck DO, 3/5/2024 10:24 AM      Patient Clinical Information   Reason for Study: Patient undergoing evaluation for episode of loss of consciousness  Patient State: Awake  Primary neurological diagnosis: Spell of uncertain etiology  Primary indication for monitoring: Characterization of spell    Pertinent Medications and Treatments    gabapentin     donepezil       Sedatives administered: No  Intubated: No  Pharmacological paralytic: No    Reporting Period  Start of Study: 1003, 3/5/2024   End of Study:  1030, 3/5/2024       EEG Description  Digital video and scalp EEG monitoring was performed using the standard protocol for this laboratory. Scalp electrodes were applied in the international 10/20 system. Multiple digital montage arrangements were utilized for evaluation. EKG and video were recorded.     Background:      Occipital rhythm (posterior dominant rhythm or PDR): Present  Frequency: 10 Hz  Voltage: Medium  Organization: good   Reactivity to eye opening/closure: good     Drowsiness: Present - normal  Sleep: Absent    Comments: Rare irregular delta activity noted bilateral in the anterior temporal regions. Not considered excessive for age and state.    Technical and Activation Procedures:  Hyperventilation: Not done  Photic stimulation: Done - physiologic driving noted  Reactivity to stimulation: Yes    Abnormalities:    I.

## 2024-07-09 ENCOUNTER — APPOINTMENT (OUTPATIENT)
Dept: CT IMAGING | Age: 69
DRG: 690 | End: 2024-07-09
Payer: COMMERCIAL

## 2024-07-09 ENCOUNTER — APPOINTMENT (OUTPATIENT)
Dept: GENERAL RADIOLOGY | Age: 69
DRG: 690 | End: 2024-07-09
Payer: COMMERCIAL

## 2024-07-09 ENCOUNTER — HOSPITAL ENCOUNTER (INPATIENT)
Age: 69
LOS: 2 days | Discharge: HOME | DRG: 690 | End: 2024-07-12
Attending: EMERGENCY MEDICINE | Admitting: STUDENT IN AN ORGANIZED HEALTH CARE EDUCATION/TRAINING PROGRAM
Payer: COMMERCIAL

## 2024-07-09 DIAGNOSIS — N10 ACUTE PYELONEPHRITIS: Primary | ICD-10-CM

## 2024-07-09 DIAGNOSIS — R41.82 ALTERED MENTAL STATUS, UNSPECIFIED ALTERED MENTAL STATUS TYPE: ICD-10-CM

## 2024-07-09 DIAGNOSIS — N39.0 URINARY TRACT INFECTION WITHOUT HEMATURIA, SITE UNSPECIFIED: ICD-10-CM

## 2024-07-09 LAB
ALBUMIN SERPL-MCNC: 4.3 G/DL (ref 3.5–5.2)
ALP SERPL-CCNC: 92 U/L (ref 35–104)
ALT SERPL-CCNC: 29 U/L (ref 0–32)
ANION GAP SERPL CALCULATED.3IONS-SCNC: 15 MMOL/L (ref 7–16)
AST SERPL-CCNC: 49 U/L (ref 0–31)
B PARAP IS1001 DNA NPH QL NAA+NON-PROBE: NOT DETECTED
B PERT DNA SPEC QL NAA+PROBE: NOT DETECTED
BACTERIA URNS QL MICRO: ABNORMAL
BASOPHILS # BLD: 0.03 K/UL (ref 0–0.2)
BASOPHILS NFR BLD: 0 % (ref 0–2)
BILIRUB SERPL-MCNC: 0.7 MG/DL (ref 0–1.2)
BILIRUB UR QL STRIP: NEGATIVE
BUN SERPL-MCNC: 14 MG/DL (ref 6–23)
C PNEUM DNA NPH QL NAA+NON-PROBE: NOT DETECTED
CALCIUM SERPL-MCNC: 9.6 MG/DL (ref 8.6–10.2)
CHLORIDE SERPL-SCNC: 94 MMOL/L (ref 98–107)
CK SERPL-CCNC: 113 U/L (ref 20–180)
CLARITY UR: ABNORMAL
CO2 SERPL-SCNC: 24 MMOL/L (ref 22–29)
COLOR UR: YELLOW
CREAT SERPL-MCNC: 1.3 MG/DL (ref 0.5–1)
EOSINOPHIL # BLD: 0 K/UL (ref 0.05–0.5)
EOSINOPHILS RELATIVE PERCENT: 0 % (ref 0–6)
ERYTHROCYTE [DISTWIDTH] IN BLOOD BY AUTOMATED COUNT: 12.8 % (ref 11.5–15)
FLUAV RNA NPH QL NAA+NON-PROBE: NOT DETECTED
FLUBV RNA NPH QL NAA+NON-PROBE: NOT DETECTED
GFR, ESTIMATED: 45 ML/MIN/1.73M2
GLUCOSE SERPL-MCNC: 111 MG/DL (ref 74–99)
GLUCOSE UR STRIP-MCNC: NEGATIVE MG/DL
HADV DNA NPH QL NAA+NON-PROBE: NOT DETECTED
HCOV 229E RNA NPH QL NAA+NON-PROBE: NOT DETECTED
HCOV HKU1 RNA NPH QL NAA+NON-PROBE: NOT DETECTED
HCOV NL63 RNA NPH QL NAA+NON-PROBE: NOT DETECTED
HCOV OC43 RNA NPH QL NAA+NON-PROBE: NOT DETECTED
HCT VFR BLD AUTO: 42.1 % (ref 34–48)
HGB BLD-MCNC: 14.3 G/DL (ref 11.5–15.5)
HGB UR QL STRIP.AUTO: NEGATIVE
HMPV RNA NPH QL NAA+NON-PROBE: NOT DETECTED
HPIV1 RNA NPH QL NAA+NON-PROBE: NOT DETECTED
HPIV2 RNA NPH QL NAA+NON-PROBE: NOT DETECTED
HPIV3 RNA NPH QL NAA+NON-PROBE: NOT DETECTED
HPIV4 RNA NPH QL NAA+NON-PROBE: NOT DETECTED
IMM GRANULOCYTES # BLD AUTO: 0.06 K/UL (ref 0–0.58)
IMM GRANULOCYTES NFR BLD: 1 % (ref 0–5)
KETONES UR STRIP-MCNC: ABNORMAL MG/DL
LACTATE BLDV-SCNC: 1.1 MMOL/L (ref 0.5–1.9)
LACTATE BLDV-SCNC: 1.3 MMOL/L (ref 0.5–1.9)
LEUKOCYTE ESTERASE UR QL STRIP: ABNORMAL
LYMPHOCYTES NFR BLD: 0.75 K/UL (ref 1.5–4)
LYMPHOCYTES RELATIVE PERCENT: 9 % (ref 20–42)
M PNEUMO DNA NPH QL NAA+NON-PROBE: NOT DETECTED
MCH RBC QN AUTO: 31.6 PG (ref 26–35)
MCHC RBC AUTO-ENTMCNC: 34 G/DL (ref 32–34.5)
MCV RBC AUTO: 93.1 FL (ref 80–99.9)
MONOCYTES NFR BLD: 0.45 K/UL (ref 0.1–0.95)
MONOCYTES NFR BLD: 5 % (ref 2–12)
NEUTROPHILS NFR BLD: 85 % (ref 43–80)
NEUTS SEG NFR BLD: 7.41 K/UL (ref 1.8–7.3)
NITRITE UR QL STRIP: NEGATIVE
PH UR STRIP: 7 [PH] (ref 5–9)
PLATELET # BLD AUTO: 284 K/UL (ref 130–450)
PMV BLD AUTO: 8.3 FL (ref 7–12)
POTASSIUM SERPL-SCNC: 3.9 MMOL/L (ref 3.5–5)
PROT SERPL-MCNC: 7.9 G/DL (ref 6.4–8.3)
PROT UR STRIP-MCNC: NEGATIVE MG/DL
RBC # BLD AUTO: 4.52 M/UL (ref 3.5–5.5)
RBC #/AREA URNS HPF: ABNORMAL /HPF
RSV RNA NPH QL NAA+NON-PROBE: NOT DETECTED
RV+EV RNA NPH QL NAA+NON-PROBE: NOT DETECTED
SARS-COV-2 RNA NPH QL NAA+NON-PROBE: NOT DETECTED
SODIUM SERPL-SCNC: 133 MMOL/L (ref 132–146)
SP GR UR STRIP: 1.01 (ref 1–1.03)
SPECIMEN DESCRIPTION: NORMAL
TROPONIN I SERPL HS-MCNC: 19 NG/L (ref 0–9)
TROPONIN I SERPL HS-MCNC: 22 NG/L (ref 0–9)
UROBILINOGEN UR STRIP-ACNC: 0.2 EU/DL (ref 0–1)
WBC #/AREA URNS HPF: ABNORMAL /HPF
WBC OTHER # BLD: 8.7 K/UL (ref 4.5–11.5)

## 2024-07-09 PROCEDURE — 93005 ELECTROCARDIOGRAM TRACING: CPT | Performed by: EMERGENCY MEDICINE

## 2024-07-09 PROCEDURE — 87040 BLOOD CULTURE FOR BACTERIA: CPT

## 2024-07-09 PROCEDURE — 0202U NFCT DS 22 TRGT SARS-COV-2: CPT

## 2024-07-09 PROCEDURE — 80053 COMPREHEN METABOLIC PANEL: CPT

## 2024-07-09 PROCEDURE — 87086 URINE CULTURE/COLONY COUNT: CPT

## 2024-07-09 PROCEDURE — 6360000002 HC RX W HCPCS

## 2024-07-09 PROCEDURE — 84484 ASSAY OF TROPONIN QUANT: CPT

## 2024-07-09 PROCEDURE — 70450 CT HEAD/BRAIN W/O DYE: CPT

## 2024-07-09 PROCEDURE — 96374 THER/PROPH/DIAG INJ IV PUSH: CPT

## 2024-07-09 PROCEDURE — 71045 X-RAY EXAM CHEST 1 VIEW: CPT

## 2024-07-09 PROCEDURE — 2580000003 HC RX 258

## 2024-07-09 PROCEDURE — 85025 COMPLETE CBC W/AUTO DIFF WBC: CPT

## 2024-07-09 PROCEDURE — 81001 URINALYSIS AUTO W/SCOPE: CPT

## 2024-07-09 PROCEDURE — 99285 EMERGENCY DEPT VISIT HI MDM: CPT

## 2024-07-09 PROCEDURE — 96375 TX/PRO/DX INJ NEW DRUG ADDON: CPT

## 2024-07-09 PROCEDURE — 6370000000 HC RX 637 (ALT 250 FOR IP): Performed by: EMERGENCY MEDICINE

## 2024-07-09 PROCEDURE — 83605 ASSAY OF LACTIC ACID: CPT

## 2024-07-09 PROCEDURE — 82550 ASSAY OF CK (CPK): CPT

## 2024-07-09 PROCEDURE — 96361 HYDRATE IV INFUSION ADD-ON: CPT

## 2024-07-09 RX ORDER — 0.9 % SODIUM CHLORIDE 0.9 %
30 INTRAVENOUS SOLUTION INTRAVENOUS ONCE
Status: COMPLETED | OUTPATIENT
Start: 2024-07-09 | End: 2024-07-09

## 2024-07-09 RX ORDER — ACETAMINOPHEN 325 MG/1
650 TABLET ORAL ONCE
Status: COMPLETED | OUTPATIENT
Start: 2024-07-09 | End: 2024-07-09

## 2024-07-09 RX ORDER — PROCHLORPERAZINE EDISYLATE 5 MG/ML
10 INJECTION INTRAMUSCULAR; INTRAVENOUS ONCE
Status: COMPLETED | OUTPATIENT
Start: 2024-07-09 | End: 2024-07-09

## 2024-07-09 RX ADMIN — ACETAMINOPHEN 650 MG: 325 TABLET ORAL at 20:13

## 2024-07-09 RX ADMIN — PROCHLORPERAZINE EDISYLATE 10 MG: 5 INJECTION INTRAMUSCULAR; INTRAVENOUS at 22:48

## 2024-07-09 RX ADMIN — SODIUM CHLORIDE 2244 ML: 9 INJECTION, SOLUTION INTRAVENOUS at 20:35

## 2024-07-09 NOTE — ED PROVIDER NOTES
[facility] EMERGENCY DEPARTMENT  EMERGENCY DEPARTMENT ENCOUNTER        Pt Name: Karlie Vaca  MRN: [id number]  Birthdate 1955  Date of evaluation: 7/9/2024  Provider: Navneet Kurtz MD  PCP: Naya Michele DO  Note Started: 7:57 PM EDT 7/9/24    CHIEF COMPLAINT       Chief Complaint   Patient presents with    Altered Mental Status     Altered mental patient alert to self. Onset since this morning.history of memory loss  stated short term. Working outside the past couple days        HISTORY OF PRESENT ILLNESS: 1 or more Elements            Karlie Vaca is a 68 y.o. female history of breast CA s/p chemotherapy, seizure-like activity, CKD, leukemia s/p bone transplant who presents for altered mental status.  Further history provided by  at bedside who states that patient has baseline memory issues, states that over the past several days she has been not acting herself, acting more lethargic than usual, slow to get around.  No recent changes in her medications, no recent falls or head injuries, per  she does not have any history of urinary incontinence.  No recent history of cough, congestion, sore throat, nausea, vomiting.     Nursing Notes were all reviewed and agreed with or any disagreements were addressed in the HPI.      REVIEW OF EXTERNAL NOTE :       Records reviewed for medical hx, surgical, hx, and medication lists      Chart Review/External Note Review    Last Echo reviewed by Me:  Lab Results   Component Value Date    LVEF 35 01/06/2022             Controlled Substance Monitoring:    Acute and Chronic Pain Monitoring:        No data to display                    REVIEW OF SYSTEMS :      Positives and Pertinent negatives as per HPI.     SURGICAL HISTORY     Past Surgical History:   Procedure Laterality Date    ANKLE SURGERY Left     ORIF    BREAST LUMPECTOMY      right side 2004.also had chemo & radiation    BREAST LUMPECTOMY Right        Must be confirmed or suspected to move forward with diagnosis of sepsis.    Must meet 2:    [x] Temperature > 100.9 F (38.3 C)        or < 96.8 F (36 C)  [] HR > 90  [] RR > 20  [] WBC > 12 or < 4 or 10% bands      AND:      [x] Infection Confirmed or        Suspected.     Must meet 1:    [] Lactate > 2       or   [] Signs of Organ Dysfunction:    - SBP < 90 or MAP < 65  - Altered mental status  - Creatinine > 2 or increased from      baseline  - Urine Output < 0.5 ml/kg/hr  - Bilirubin > 2  - INR > 1.5 (not anticoagulated)  - Platelets < 100,000  - Acute Respiratory Failure as     evidenced by new need for NIPPV     or mechanical ventilation      [] No criteria met for Severe Sepsis.   Must meet 1:    [] Lactate > 4        or   [] SBP < 90 or MAP < 65 for at        least two readings in the first        hour after fluid bolus        administration      [] Vasopressors initiated (if hypotension persists after fluid resuscitation)        [] No criteria met for Septic Shock.   Patient Vitals for the past 6 hrs:   BP Temp Pulse Resp SpO2   07/09/24 2053 (!) 122/59 98.3 °F (36.8 °C) 70 12 93 %   07/09/24 2130 (!) 127/59 -- 70 14 100 %   07/09/24 2300 138/81 -- 69 19 99 %   07/09/24 2345 132/74 -- 64 17 95 %   07/10/24 0000 124/72 -- 59 17 94 %   07/10/24 0100 132/80 -- 65 13 97 %      Recent Labs     07/09/24 2017   WBC 8.7   CREATININE 1.3*   BILITOT 0.7              Fluid Resuscitation Rational: at least 30mL/kg based on entered actual weight at time of triage    Repeat lactate level: improving    Reassessment Exam:   Not applicable. Patient does not have septic shock.          Medical Decision Making/Differential Diagnosis:    CC/HPI Summary, Social Determinants of health, Records Reviewed, DDx, testing done/not done, ED Course, Reassessment, disposition considerations/shared decision making with patient, consults, disposition:      The cardiac monitor revealed nsr with a heart rate in the 80s as

## 2024-07-10 ENCOUNTER — APPOINTMENT (OUTPATIENT)
Dept: CT IMAGING | Age: 69
DRG: 690 | End: 2024-07-10
Payer: COMMERCIAL

## 2024-07-10 PROBLEM — N39.0 UTI (URINARY TRACT INFECTION): Status: ACTIVE | Noted: 2024-07-10

## 2024-07-10 LAB
ALBUMIN SERPL-MCNC: 3.7 G/DL (ref 3.5–5.2)
ALP SERPL-CCNC: 81 U/L (ref 35–104)
ALT SERPL-CCNC: 57 U/L (ref 0–32)
ANION GAP SERPL CALCULATED.3IONS-SCNC: 14 MMOL/L (ref 7–16)
AST SERPL-CCNC: 93 U/L (ref 0–31)
BILIRUB SERPL-MCNC: 0.7 MG/DL (ref 0–1.2)
BUN SERPL-MCNC: 12 MG/DL (ref 6–23)
CALCIUM SERPL-MCNC: 9 MG/DL (ref 8.6–10.2)
CHLORIDE SERPL-SCNC: 100 MMOL/L (ref 98–107)
CO2 SERPL-SCNC: 24 MMOL/L (ref 22–29)
CREAT SERPL-MCNC: 1.1 MG/DL (ref 0.5–1)
CRP SERPL HS-MCNC: 67 MG/L (ref 0–5)
EKG ATRIAL RATE: 87 BPM
EKG P AXIS: 48 DEGREES
EKG P-R INTERVAL: 158 MS
EKG Q-T INTERVAL: 388 MS
EKG QRS DURATION: 140 MS
EKG QTC CALCULATION (BAZETT): 466 MS
EKG R AXIS: -60 DEGREES
EKG T AXIS: 101 DEGREES
EKG VENTRICULAR RATE: 87 BPM
ERYTHROCYTE [SEDIMENTATION RATE] IN BLOOD BY WESTERGREN METHOD: 41 MM/HR (ref 0–20)
GFR, ESTIMATED: 54 ML/MIN/1.73M2
GLUCOSE SERPL-MCNC: 110 MG/DL (ref 74–99)
LACTATE BLDV-SCNC: 1.6 MMOL/L (ref 0.5–2.2)
POTASSIUM SERPL-SCNC: 3.6 MMOL/L (ref 3.5–5)
PROCALCITONIN SERPL-MCNC: 0.72 NG/ML (ref 0–0.08)
PROT SERPL-MCNC: 6.5 G/DL (ref 6.4–8.3)
SODIUM SERPL-SCNC: 138 MMOL/L (ref 132–146)

## 2024-07-10 PROCEDURE — 93010 ELECTROCARDIOGRAM REPORT: CPT | Performed by: INTERNAL MEDICINE

## 2024-07-10 PROCEDURE — 2140000000 HC CCU INTERMEDIATE R&B

## 2024-07-10 PROCEDURE — 96365 THER/PROPH/DIAG IV INF INIT: CPT

## 2024-07-10 PROCEDURE — 96361 HYDRATE IV INFUSION ADD-ON: CPT

## 2024-07-10 PROCEDURE — 80053 COMPREHEN METABOLIC PANEL: CPT

## 2024-07-10 PROCEDURE — 6360000002 HC RX W HCPCS: Performed by: EMERGENCY MEDICINE

## 2024-07-10 PROCEDURE — 86140 C-REACTIVE PROTEIN: CPT

## 2024-07-10 PROCEDURE — 96366 THER/PROPH/DIAG IV INF ADDON: CPT

## 2024-07-10 PROCEDURE — 2580000003 HC RX 258: Performed by: PHYSICIAN ASSISTANT

## 2024-07-10 PROCEDURE — 6360000002 HC RX W HCPCS: Performed by: STUDENT IN AN ORGANIZED HEALTH CARE EDUCATION/TRAINING PROGRAM

## 2024-07-10 PROCEDURE — G0378 HOSPITAL OBSERVATION PER HR: HCPCS

## 2024-07-10 PROCEDURE — 74176 CT ABD & PELVIS W/O CONTRAST: CPT

## 2024-07-10 PROCEDURE — 85652 RBC SED RATE AUTOMATED: CPT

## 2024-07-10 PROCEDURE — 2580000003 HC RX 258

## 2024-07-10 PROCEDURE — 2580000003 HC RX 258: Performed by: STUDENT IN AN ORGANIZED HEALTH CARE EDUCATION/TRAINING PROGRAM

## 2024-07-10 PROCEDURE — 6370000000 HC RX 637 (ALT 250 FOR IP): Performed by: PHYSICIAN ASSISTANT

## 2024-07-10 PROCEDURE — 6360000002 HC RX W HCPCS

## 2024-07-10 PROCEDURE — 36415 COLL VENOUS BLD VENIPUNCTURE: CPT

## 2024-07-10 PROCEDURE — 83605 ASSAY OF LACTIC ACID: CPT

## 2024-07-10 PROCEDURE — 84145 PROCALCITONIN (PCT): CPT

## 2024-07-10 RX ORDER — ONDANSETRON 4 MG/1
4 TABLET, ORALLY DISINTEGRATING ORAL EVERY 8 HOURS PRN
Status: DISCONTINUED | OUTPATIENT
Start: 2024-07-10 | End: 2024-07-12 | Stop reason: HOSPADM

## 2024-07-10 RX ORDER — HALOPERIDOL 5 MG/ML
2.5 INJECTION INTRAMUSCULAR ONCE
Status: COMPLETED | OUTPATIENT
Start: 2024-07-10 | End: 2024-07-10

## 2024-07-10 RX ORDER — SODIUM CHLORIDE 0.9 % (FLUSH) 0.9 %
10 SYRINGE (ML) INJECTION PRN
Status: DISCONTINUED | OUTPATIENT
Start: 2024-07-10 | End: 2024-07-12 | Stop reason: HOSPADM

## 2024-07-10 RX ORDER — METOPROLOL SUCCINATE 25 MG/1
25 TABLET, EXTENDED RELEASE ORAL DAILY
Status: DISCONTINUED | OUTPATIENT
Start: 2024-07-10 | End: 2024-07-12 | Stop reason: HOSPADM

## 2024-07-10 RX ORDER — ACETAMINOPHEN 650 MG/1
650 SUPPOSITORY RECTAL EVERY 6 HOURS PRN
Status: DISCONTINUED | OUTPATIENT
Start: 2024-07-10 | End: 2024-07-12 | Stop reason: HOSPADM

## 2024-07-10 RX ORDER — ENOXAPARIN SODIUM 100 MG/ML
40 INJECTION SUBCUTANEOUS DAILY
Status: DISCONTINUED | OUTPATIENT
Start: 2024-07-10 | End: 2024-07-12 | Stop reason: HOSPADM

## 2024-07-10 RX ORDER — POTASSIUM CHLORIDE 7.45 MG/ML
10 INJECTION INTRAVENOUS PRN
Status: DISCONTINUED | OUTPATIENT
Start: 2024-07-10 | End: 2024-07-12 | Stop reason: HOSPADM

## 2024-07-10 RX ORDER — SPIRONOLACTONE 25 MG/1
12.5 TABLET ORAL DAILY
Status: DISCONTINUED | OUTPATIENT
Start: 2024-07-10 | End: 2024-07-12 | Stop reason: HOSPADM

## 2024-07-10 RX ORDER — POTASSIUM CHLORIDE 20 MEQ/1
40 TABLET, EXTENDED RELEASE ORAL PRN
Status: DISCONTINUED | OUTPATIENT
Start: 2024-07-10 | End: 2024-07-12 | Stop reason: HOSPADM

## 2024-07-10 RX ORDER — SODIUM CHLORIDE 9 MG/ML
INJECTION, SOLUTION INTRAVENOUS PRN
Status: DISCONTINUED | OUTPATIENT
Start: 2024-07-10 | End: 2024-07-12 | Stop reason: HOSPADM

## 2024-07-10 RX ORDER — SENNOSIDES A AND B 8.6 MG/1
1 TABLET, FILM COATED ORAL DAILY PRN
Status: DISCONTINUED | OUTPATIENT
Start: 2024-07-10 | End: 2024-07-12 | Stop reason: HOSPADM

## 2024-07-10 RX ORDER — MAGNESIUM SULFATE IN WATER 40 MG/ML
2000 INJECTION, SOLUTION INTRAVENOUS PRN
Status: DISCONTINUED | OUTPATIENT
Start: 2024-07-10 | End: 2024-07-12 | Stop reason: HOSPADM

## 2024-07-10 RX ORDER — DONEPEZIL HYDROCHLORIDE 5 MG/1
5 TABLET, FILM COATED ORAL NIGHTLY
Status: DISCONTINUED | OUTPATIENT
Start: 2024-07-10 | End: 2024-07-10

## 2024-07-10 RX ORDER — SPIRONOLACTONE 25 MG/1
12.5 TABLET ORAL DAILY
Status: DISCONTINUED | OUTPATIENT
Start: 2024-07-10 | End: 2024-07-10

## 2024-07-10 RX ORDER — RIVASTIGMINE TARTRATE 1.5 MG/1
1.5 CAPSULE ORAL 2 TIMES DAILY
COMMUNITY

## 2024-07-10 RX ORDER — HALOPERIDOL 5 MG/ML
INJECTION INTRAMUSCULAR
Status: DISPENSED
Start: 2024-07-10 | End: 2024-07-10

## 2024-07-10 RX ORDER — SODIUM CHLORIDE, SODIUM LACTATE, POTASSIUM CHLORIDE, CALCIUM CHLORIDE 600; 310; 30; 20 MG/100ML; MG/100ML; MG/100ML; MG/100ML
INJECTION, SOLUTION INTRAVENOUS CONTINUOUS
Status: DISCONTINUED | OUTPATIENT
Start: 2024-07-10 | End: 2024-07-12

## 2024-07-10 RX ORDER — ONDANSETRON 2 MG/ML
4 INJECTION INTRAMUSCULAR; INTRAVENOUS EVERY 6 HOURS PRN
Status: DISCONTINUED | OUTPATIENT
Start: 2024-07-10 | End: 2024-07-12 | Stop reason: HOSPADM

## 2024-07-10 RX ORDER — ACETAMINOPHEN 325 MG/1
650 TABLET ORAL EVERY 6 HOURS PRN
Status: DISCONTINUED | OUTPATIENT
Start: 2024-07-10 | End: 2024-07-12 | Stop reason: HOSPADM

## 2024-07-10 RX ORDER — SODIUM CHLORIDE 0.9 % (FLUSH) 0.9 %
10 SYRINGE (ML) INJECTION EVERY 12 HOURS SCHEDULED
Status: DISCONTINUED | OUTPATIENT
Start: 2024-07-10 | End: 2024-07-12 | Stop reason: HOSPADM

## 2024-07-10 RX ADMIN — MEROPENEM 500 MG: 500 INJECTION, POWDER, FOR SOLUTION INTRAVENOUS at 20:49

## 2024-07-10 RX ADMIN — HALOPERIDOL LACTATE 2.5 MG: 5 INJECTION, SOLUTION INTRAMUSCULAR at 04:16

## 2024-07-10 RX ADMIN — SPIRONOLACTONE 12.5 MG: 25 TABLET ORAL at 16:21

## 2024-07-10 RX ADMIN — SODIUM CHLORIDE, PRESERVATIVE FREE 10 ML: 5 INJECTION INTRAVENOUS at 20:50

## 2024-07-10 RX ADMIN — METOPROLOL SUCCINATE 25 MG: 25 TABLET, EXTENDED RELEASE ORAL at 13:15

## 2024-07-10 RX ADMIN — MEROPENEM 1000 MG: 1 INJECTION INTRAVENOUS at 00:09

## 2024-07-10 RX ADMIN — SACUBITRIL AND VALSARTAN 1 TABLET: 24; 26 TABLET, FILM COATED ORAL at 13:15

## 2024-07-10 RX ADMIN — SODIUM CHLORIDE, PRESERVATIVE FREE 10 ML: 5 INJECTION INTRAVENOUS at 13:14

## 2024-07-10 RX ADMIN — SACUBITRIL AND VALSARTAN 1 TABLET: 24; 26 TABLET, FILM COATED ORAL at 20:50

## 2024-07-10 RX ADMIN — SODIUM CHLORIDE, POTASSIUM CHLORIDE, SODIUM LACTATE AND CALCIUM CHLORIDE: 600; 310; 30; 20 INJECTION, SOLUTION INTRAVENOUS at 12:32

## 2024-07-10 ASSESSMENT — PAIN SCALES - WONG BAKER: WONGBAKER_NUMERICALRESPONSE: NO HURT

## 2024-07-10 NOTE — ED NOTES
Patient was put on pure wick earlier in the night and the patient was using it successfully, urine was noted in canister. The patient later became restless and agitated, was rocking back and forth appearing very anxious. This RN was at bedside with  and PCA when patient began to refuse to use the bathroom. Explained pure wick to patient again. Patient refused to go. Offered patient straight cath and bedpan, patient continued to refuse to use bathroom. Educated patient that holding in her urine is not healthy and she already has a mild UTI and this can make it worse. Patient still agitated, grabbing on bed siderails and trying to climb out of bed. When asked patient what was wrong, patient stated \"I dont know\". Encouraged patient to express feelings, patient continued to say nothing and was grabbing PCA and behaving in strange ways such as stretching and grabbing her feet. Informed resident and received orders to give 2.5mg IM haldol. Haldol was administered into left deltoid. Patient still refusing to use bedpan even though she was on it, patient would not lay down. Removed bed pan. Patient then urinated in the bed.

## 2024-07-10 NOTE — PROGRESS NOTES
Antibiotic Extended Infusion Policy     This patient is on medication that requires renal, weight, and/or indication dose adjustment.      Date Body Weight IBW  Adjusted BW SCr  CrCl Dialysis status BMI   7/10/2024 74.8 kg (165 lb) Ideal body weight: 63.2 kg (139 lb 7 oz)  Adjusted ideal body weight: 67.9 kg (149 lb 10.6 oz) Serum creatinine: 1.3 mg/dL (H) 07/09/24 2017  Estimated creatinine clearance: 42 mL/min (A) N/a Body mass index is 25.3 kg/m².       Pharmacy has dose-adjusted the following medication(s):    Ordered Medication: Meropenem 1000mg q8h     Order Changed/converted to: Meropenem 1000mg q12h    These changes were made per protocol according to the [facility]   Automatic Extended Infusion Dose Adjustment Policy.     *Please note this dose may need readjusted if patient's condition changes.    Please contact pharmacy with any questions regarding these changes.    Abdias Patiño RPH  7/10/2024  12:08 AM

## 2024-07-10 NOTE — PROGRESS NOTES
Pt lethargic starting dry heaving- sitting in chair leaning against her  and became unresponsive. BP obtained pt moved to ED cart in ST6 Dr Kurtz in to eval pt vss iv fluid iinfusing

## 2024-07-10 NOTE — H&P
Internal Medicine History & Physical     Name: Karlie Vaca  : 1955  Chief Complaint: Altered Mental Status (Altered mental patient alert to self. Onset since this morning.history of memory loss  stated short term. Working outside the past couple days )  Primary Care Physician: Naya Michele DO  Admission date: 2024  Date of service: 7/10/2024     History of Present Illness  Karlie is a 68 y.o. year old female who presented with a chief complaint of AMS      History obtained from  at bedside. Patient does not know where she is when or why she is. He states she woke up yesterday and did not want to eat drink or do any thing else. She was working outside the day before yesterday. Moving on through the morning she did not improve. She was drinking ok. Got her into the car they went and did a few errands.     She has a history of AML breast cancer dementia, cognitive impairment she has been seen by [facility] for this as detailed below.     She is very confused however there is no nuchal rigidity on exam.     Lactic acid ok, cxr clear, ct abdomen ok, UA does not seem impressive enough to be causing 103 temps       Past Medical History:   Diagnosis Date    Acute leukemia (HCC)     Breast cancer (HCC)     right    Cancer (HCC)     right breast    History of blood transfusion     Hypertension        Past Surgical History:   Procedure Laterality Date    ANKLE SURGERY Left     ORIF    BREAST LUMPECTOMY      right side .also had chemo & radiation    BREAST LUMPECTOMY Right     CHOLECYSTECTOMY, LAPAROSCOPIC N/A 2018    CHOLECYSTECTOMY LAPAROSCOPIC performed by Ravi Bennett MD at [facility] [place]    DILATION AND CURETTAGE OF UTERUS      OTHER SURGICAL HISTORY Left 2016    left hand middle finger ganglion removal    TUBAL LIGATION         No family history on file.      Social History  Patient lives at  home with    At baseline patient ambulates with out assistance   Illicit drugs: Denies    TOBACCO:   reports that she has never smoked. She has never used smokeless tobacco.  ETOH:   reports current alcohol use.    Home Medications  Prior to Admission medications    Medication Sig Start Date End Date Taking? Authorizing Provider   rivastigmine (EXELON) 1.5 MG capsule Take 1 capsule by mouth 2 times daily   Yes Rachel Torres MD   donepezil (ARICEPT) 5 MG tablet Take 1 tablet by mouth nightly  Patient not taking: Reported on 7/10/2024    Rachel Torres MD   spironolactone (ALDACTONE) 25 MG tablet Take 0.5 tablets by mouth daily    Rachel Torres MD   furosemide (LASIX) 20 MG tablet Take 1 tablet by mouth once a week Saturday    Rachel Torres MD   potassium chloride (MICRO-K) 10 MEQ extended release capsule Take 1 capsule by mouth once a week Saturday    Rachel Torres MD   pimecrolimus (ELIDEL) 1 % cream Apply topically    Rachel Torres MD   calcipotriene (DOVONEX) 0.005 % cream Apply topically    Rachel Torres MD   vitamin D 25 MCG (1000 UT) CAPS Take 1 capsule by mouth daily    Rachel Torres MD   metoprolol succinate (TOPROL XL) 25 MG extended release tablet Take 1 tablet by mouth daily 5/23/20   Naya Michele DO   sacubitril-valsartan (ENTRESTO) 24-26 MG per tablet Take 1 tablet by mouth 2 times daily 5/22/20   Naya Michele DO   gabapentin (NEURONTIN) 100 MG capsule Take 3 capsules by mouth See Admin Instructions. 300 MG QAM  MG QHS 6/7/18   Rachel Torres MD   therapeutic multivitamin-minerals (THERAGRAN-M) tablet Take 1 tablet by mouth daily    Rachel Torres MD       Allergies  Allergies   Allergen Reactions    Penicillins Shortness Of Breath     Pt said she \"passed out\"    Vancomycin Itching     Pt said she gets a \"redness and swelling\" as well       Review of Systems  Please see HPI above. All bolded are positive. All un-bolded are negative.  Constitutional Symptoms: fever, chills, fatigue, generalized  throughout, normal muscle tone throughout, face symmetric, hearing intact, tongue midline, speech appropriate without slurring, sensation to fine touch intact in upper and lower extremities    Labs-   Lab Results   Component Value Date    WBC 8.7 07/09/2024    HGB 14.3 07/09/2024    HCT 42.1 07/09/2024     07/09/2024     07/09/2024    K 3.9 07/09/2024    CL 94 (L) 07/09/2024    CREATININE 1.3 (H) 07/09/2024    BUN 14 07/09/2024    CO2 24 07/09/2024    GLUCOSE 111 (H) 07/09/2024    ALT 29 07/09/2024    AST 49 (H) 07/09/2024    INR 1.0 01/05/2022    APTT 24.0 (L) 09/28/2017     Lab Results   Component Value Date    CKTOTAL 113 07/09/2024    CKMB 1.1 08/28/2012    TROPONINI 0.02 05/21/2020       Last echocardiogram:      Recent Radiological Studies:  CT HEAD WO CONTRAST   Final Result   No acute intracranial abnormality.         XR CHEST PORTABLE   Final Result   Chronic changes seen throughout the lung field with no acute infiltrate or   effusion.             Assessment  Active Hospital Problems    Diagnosis     UTI (urinary tract infection) [N39.0]     Altered mental status [R41.82]     Chronic HFrEF (heart failure with reduced ejection fraction) ([facility]) [I50.22]     Acute myeloid leukemia in remission ([facility]) [C92.01]     Stage 3a chronic kidney disease ([facility]) [N18.31]     NICM (nonischemic cardiomyopathy) ([facility]) [I42.8]        Patient Active Problem List    Diagnosis Date Noted    UTI (urinary tract infection) 07/10/2024    Altered mental status 03/05/2024    Seizure-like activity ([facility]) 03/05/2024    Syncope and collapse 03/03/2024    Chronic HFrEF (heart failure with reduced ejection fraction) ([facility]) 05/24/2023    Acute myeloid leukemia in remission ([facility]) 05/24/2023    Stage 3a chronic kidney disease ([facility]) 05/24/2023    Nonrheumatic mitral valve regurgitation 05/24/2023    NICM (nonischemic cardiomyopathy) ([facility])     Gurgling breath sounds 05/19/2020    Glenohumeral arthritis, left 10/27/2017    Impingement

## 2024-07-10 NOTE — CARE COORDINATION
Internal Medicine On-call Care Coordination Note    I was called by the ED physician because they recommended admission for this patient and I cover their PCP.  The history as I understand it after discussion with the ED physician is as follows:    Presenting with AMS, not acting like herself  Has been outside working in the yard for the past several days  On arrival temperature up at 103F -- improving  CT head unremarkable  UA concerning for UTI  Given meropenem, decision made for admission    I placed admission orders.  Including:    Continue meropenem pending culture results  Consider neuro eval if mental status not improving    Dr. Boland or his coverage will see the patient tomorrow for H&P.    Electronically signed by Abdulaziz Dasilva PA-C on 7/9/2024 at 11:59 PM

## 2024-07-10 NOTE — ED NOTES
PCA told this RN  and pt were attempting to get patient up, informed  that patient's mental status is very altered and she came to ER for passing out, was also medicated with haldol previously, and therefore is a risk for falls. Assisted patient with bedpan, patient refusing to void and is thrashing in bed

## 2024-07-10 NOTE — PROGRESS NOTES
4 Eyes Skin Assessment     NAME:  Karlie Vaca  YOB: 1955  MEDICAL RECORD NUMBER:  [id number]    The patient is being assessed for  Admission    I agree that at least one RN has performed a thorough Head to Toe Skin Assessment on the patient. ALL assessment sites listed below have been assessed.      Areas assessed by both nurses:    Head, Face, Ears, Shoulders, Back, Chest, Arms, Elbows, Hands, Sacrum. Buttock, Coccyx, Ischium, Legs. Feet and Heels, Under Medical Devices , and Other ***        Does the Patient have a Wound? No noted wound(s)       Saad Prevention initiated by RN: Yes  Wound Care Orders initiated by RN: No    Pressure Injury (Stage 3,4, Unstageable, DTI, NWPT, and Complex wounds) if present, place Wound referral order by RN under : No    New Ostomies, if present place, Ostomy referral order under : No     Nurse 1 eSignature: Electronically signed by Usama John RN on 7/10/24 at 1:32 PM EDT    **SHARE this note so that the co-signing nurse can place an eSignature**    Nurse 2 eSignature: {Esignature:[id number]}

## 2024-07-11 LAB
ALBUMIN SERPL-MCNC: 3.5 G/DL (ref 3.5–5.2)
ALP SERPL-CCNC: 80 U/L (ref 35–104)
ALT SERPL-CCNC: 69 U/L (ref 0–32)
ANION GAP SERPL CALCULATED.3IONS-SCNC: 11 MMOL/L (ref 7–16)
AST SERPL-CCNC: 81 U/L (ref 0–31)
BASOPHILS # BLD: 0.03 K/UL (ref 0–0.2)
BASOPHILS NFR BLD: 1 % (ref 0–2)
BILIRUB SERPL-MCNC: 0.6 MG/DL (ref 0–1.2)
BUN SERPL-MCNC: 11 MG/DL (ref 6–23)
CALCIUM SERPL-MCNC: 8.8 MG/DL (ref 8.6–10.2)
CHLORIDE SERPL-SCNC: 101 MMOL/L (ref 98–107)
CO2 SERPL-SCNC: 24 MMOL/L (ref 22–29)
CREAT SERPL-MCNC: 1 MG/DL (ref 0.5–1)
EOSINOPHIL # BLD: 0.03 K/UL (ref 0.05–0.5)
EOSINOPHILS RELATIVE PERCENT: 1 % (ref 0–6)
ERYTHROCYTE [DISTWIDTH] IN BLOOD BY AUTOMATED COUNT: 12.9 % (ref 11.5–15)
GFR, ESTIMATED: 59 ML/MIN/1.73M2
GLUCOSE SERPL-MCNC: 110 MG/DL (ref 74–99)
HCT VFR BLD AUTO: 36.1 % (ref 34–48)
HGB BLD-MCNC: 12.3 G/DL (ref 11.5–15.5)
IMM GRANULOCYTES # BLD AUTO: <0.03 K/UL (ref 0–0.58)
IMM GRANULOCYTES NFR BLD: 0 % (ref 0–5)
LYMPHOCYTES NFR BLD: 0.8 K/UL (ref 1.5–4)
LYMPHOCYTES RELATIVE PERCENT: 18 % (ref 20–42)
MCH RBC QN AUTO: 30.8 PG (ref 26–35)
MCHC RBC AUTO-ENTMCNC: 34.1 G/DL (ref 32–34.5)
MCV RBC AUTO: 90.5 FL (ref 80–99.9)
MICROORGANISM SPEC CULT: ABNORMAL
MONOCYTES NFR BLD: 0.6 K/UL (ref 0.1–0.95)
MONOCYTES NFR BLD: 14 % (ref 2–12)
NEUTROPHILS NFR BLD: 67 % (ref 43–80)
NEUTS SEG NFR BLD: 2.96 K/UL (ref 1.8–7.3)
PLATELET # BLD AUTO: 198 K/UL (ref 130–450)
PMV BLD AUTO: 8.7 FL (ref 7–12)
POTASSIUM SERPL-SCNC: 3.8 MMOL/L (ref 3.5–5)
PROT SERPL-MCNC: 6.1 G/DL (ref 6.4–8.3)
RBC # BLD AUTO: 3.99 M/UL (ref 3.5–5.5)
SERVICE CMNT-IMP: ABNORMAL
SODIUM SERPL-SCNC: 136 MMOL/L (ref 132–146)
SPECIMEN DESCRIPTION: ABNORMAL
WBC OTHER # BLD: 4.4 K/UL (ref 4.5–11.5)

## 2024-07-11 PROCEDURE — 36415 COLL VENOUS BLD VENIPUNCTURE: CPT

## 2024-07-11 PROCEDURE — 85025 COMPLETE CBC W/AUTO DIFF WBC: CPT

## 2024-07-11 PROCEDURE — 96361 HYDRATE IV INFUSION ADD-ON: CPT

## 2024-07-11 PROCEDURE — 2580000003 HC RX 258: Performed by: STUDENT IN AN ORGANIZED HEALTH CARE EDUCATION/TRAINING PROGRAM

## 2024-07-11 PROCEDURE — 97535 SELF CARE MNGMENT TRAINING: CPT

## 2024-07-11 PROCEDURE — 2580000003 HC RX 258: Performed by: PHYSICIAN ASSISTANT

## 2024-07-11 PROCEDURE — 97530 THERAPEUTIC ACTIVITIES: CPT

## 2024-07-11 PROCEDURE — 6370000000 HC RX 637 (ALT 250 FOR IP): Performed by: INTERNAL MEDICINE

## 2024-07-11 PROCEDURE — 6360000002 HC RX W HCPCS: Performed by: STUDENT IN AN ORGANIZED HEALTH CARE EDUCATION/TRAINING PROGRAM

## 2024-07-11 PROCEDURE — 96366 THER/PROPH/DIAG IV INF ADDON: CPT

## 2024-07-11 PROCEDURE — 80053 COMPREHEN METABOLIC PANEL: CPT

## 2024-07-11 PROCEDURE — 51798 US URINE CAPACITY MEASURE: CPT

## 2024-07-11 PROCEDURE — 97161 PT EVAL LOW COMPLEX 20 MIN: CPT

## 2024-07-11 PROCEDURE — 97165 OT EVAL LOW COMPLEX 30 MIN: CPT

## 2024-07-11 PROCEDURE — G0378 HOSPITAL OBSERVATION PER HR: HCPCS

## 2024-07-11 PROCEDURE — 2140000000 HC CCU INTERMEDIATE R&B

## 2024-07-11 PROCEDURE — 6370000000 HC RX 637 (ALT 250 FOR IP): Performed by: PHYSICIAN ASSISTANT

## 2024-07-11 RX ORDER — SULFAMETHOXAZOLE AND TRIMETHOPRIM 800; 160 MG/1; MG/1
1 TABLET ORAL EVERY 12 HOURS SCHEDULED
Status: DISCONTINUED | OUTPATIENT
Start: 2024-07-11 | End: 2024-07-12 | Stop reason: HOSPADM

## 2024-07-11 RX ADMIN — SACUBITRIL AND VALSARTAN 1 TABLET: 24; 26 TABLET, FILM COATED ORAL at 09:15

## 2024-07-11 RX ADMIN — SODIUM CHLORIDE, PRESERVATIVE FREE 10 ML: 5 INJECTION INTRAVENOUS at 09:15

## 2024-07-11 RX ADMIN — SODIUM CHLORIDE, POTASSIUM CHLORIDE, SODIUM LACTATE AND CALCIUM CHLORIDE: 600; 310; 30; 20 INJECTION, SOLUTION INTRAVENOUS at 20:22

## 2024-07-11 RX ADMIN — SPIRONOLACTONE 12.5 MG: 25 TABLET ORAL at 15:58

## 2024-07-11 RX ADMIN — SACUBITRIL AND VALSARTAN 1 TABLET: 24; 26 TABLET, FILM COATED ORAL at 20:20

## 2024-07-11 RX ADMIN — METOPROLOL SUCCINATE 25 MG: 25 TABLET, EXTENDED RELEASE ORAL at 09:15

## 2024-07-11 RX ADMIN — SULFAMETHOXAZOLE AND TRIMETHOPRIM 1 TABLET: 800; 160 TABLET ORAL at 20:20

## 2024-07-11 RX ADMIN — SODIUM CHLORIDE, POTASSIUM CHLORIDE, SODIUM LACTATE AND CALCIUM CHLORIDE: 600; 310; 30; 20 INJECTION, SOLUTION INTRAVENOUS at 10:57

## 2024-07-11 RX ADMIN — SODIUM CHLORIDE, POTASSIUM CHLORIDE, SODIUM LACTATE AND CALCIUM CHLORIDE: 600; 310; 30; 20 INJECTION, SOLUTION INTRAVENOUS at 01:31

## 2024-07-11 RX ADMIN — MEROPENEM 500 MG: 500 INJECTION, POWDER, FOR SOLUTION INTRAVENOUS at 11:43

## 2024-07-11 RX ADMIN — MEROPENEM 500 MG: 500 INJECTION, POWDER, FOR SOLUTION INTRAVENOUS at 04:27

## 2024-07-11 NOTE — CONSULTS
New consult sent to infectious disease via answering service; Dr. Bliss on call    Jesusita Causey RN

## 2024-07-11 NOTE — CONSULTS
[facility]  [facility]    Consultation Note     Admit Date: 7/9/2024  6:48 PM    Reason for Consult:   UTI    Attending Physician:  Vincent Boland MD     Chief Complaint: Altered mental status    HISTORY OF PRESENT ILLNESS:   68-year-old female with past medical history of leukemia, breast CA, HTN presented with altered mental status.  She was found to have high-grade fever of Tmax of 103.  UA shows pyuria and bacteriuria.  Urine cultures positive for Staphylococcus saprophyticus.  CT abdomen showed bilateral prominent renal collecting systems right greater than left without nephrolithiasis.  ID consulted for UTI.    Past Medical History:        Diagnosis Date    Acute leukemia (HCC)     Breast cancer (HCC)     right    Cancer (HCC) 2004    right breast    History of blood transfusion     Hypertension      Past Surgical History:        Procedure Laterality Date    ANKLE SURGERY Left     ORIF    BREAST LUMPECTOMY      right side 2004.also had chemo & radiation    BREAST LUMPECTOMY Right     CHOLECYSTECTOMY, LAPAROSCOPIC N/A 12/6/2018    CHOLECYSTECTOMY LAPAROSCOPIC performed by Ravi Bennett MD at [facility] [place]    DILATION AND CURETTAGE OF UTERUS      OTHER SURGICAL HISTORY Left 11/29/2016    left hand middle finger ganglion removal    TUBAL LIGATION       Current Medications:   Scheduled Meds:   sulfamethoxazole-trimethoprim  1 tablet Oral 2 times per day    sacubitril-valsartan  1 tablet Oral BID    metoprolol succinate  25 mg Oral Daily    sodium chloride flush  10 mL IntraVENous 2 times per day    [Held by provider] enoxaparin  40 mg SubCUTAneous Daily    spironolactone  12.5 mg Oral Daily     Continuous Infusions:   sodium chloride      lactated ringers IV soln 100 mL/hr at 07/11/24 1057     PRN Meds:sodium chloride flush, sodium chloride, potassium chloride **OR** potassium alternative oral replacement **OR** potassium chloride, magnesium sulfate, ondansetron **OR** ondansetron, senna,

## 2024-07-11 NOTE — PROGRESS NOTES
Occupational Therapy  OCCUPATIONAL THERAPY INITIAL EVALUATION    [facility]  [address], [place]      Date:2024                                                Patient Name: Karlie Vaca  MRN: [id number]  : 1955  Room: 6417/6417-B    Evaluating OT: Vincent Salguero OTR/L #[id number]     Referring Provider: Abdulaziz Dasilva PA-C   Specific Provider Orders/Date: OT eval and treat 7/10/24    Diagnosis: Altered mental status [R41.82]  Urinary tract infection without hematuria, site unspecified [N39.0]  Altered mental status, unspecified altered mental status type [R41.82]   Pt admitted to hospital with AMS    Pertinent Medical History:  has a past medical history of Acute leukemia (HCC), Breast cancer (HCC), Cancer (HCC), History of blood transfusion, and Hypertension.       Precautions:  Fall Risk, cognition, bed alarm    Assessment of current deficits    [x] Functional mobility  [x]ADLs  [x] Strength               [x]Cognition    [x] Functional transfers   [x] IADLs         [x] Safety Awareness   [x]Endurance    [] Fine Coordination              [x] Balance      [] Vision/perception   []Sensation     []Gross Motor Coordination  [] ROM  [] Delirium                   [] Motor Control     OT PLAN OF CARE   OT POC based on physician orders, patient diagnosis and results of clinical assessment    Frequency/Duration 1-3 days/wk for 2 weeks PRN   Specific OT Treatment Interventions to include:   * Instruction/training on adapted ADL techniques and AE recommendations to increase functional independence within precautions       * Training on energy conservation strategies, correct breathing pattern and techniques to improve independence/tolerance for self-care routine  * Functional transfer/mobility training/DME recommendations for increased independence, safety, and fall prevention  * Patient/Family education to increase follow through with safety

## 2024-07-11 NOTE — CARE COORDINATION
7/11:  Transition of care:  Pt presented to the ER for AMS from home.  Pt is on room air at 98%, IV Merrem til 7/15, IV Fluids & SQ Lovenox.  ID consulted.  CM spoke with pt & her  at bedside to discuss CM role & dc planning.  Pt's PCP is Dr Michele & uses [facility].  Pt lives in a house with her  with 4 steps to enter.  The bed/bathroom are on the 2nd floor with 14 steps.  PTA pt was independent with out any DME.  Pt's dc plan is home & her family can transport.  Sw/CM will continue to follow.  Electronically signed by Urvashi Johns RN on 7/11/2024 at 1:00 PM    Case Management Assessment  Initial Evaluation    Date/Time of Evaluation: 7/11/2024 1:03 PM  Assessment Completed by: Urvashi Johns RN    If patient is discharged prior to next notation, then this note serves as note for discharge by case management.    Patient Name: Karlie Vaca                   YOB: 1955  Diagnosis: Altered mental status [R41.82]  Urinary tract infection without hematuria, site unspecified [N39.0]  Altered mental status, unspecified altered mental status type [R41.82]                   Date / Time: 7/9/2024  6:48 PM    Patient Admission Status: Inpatient   Readmission Risk (Low < 19, Mod (19-27), High > 27): Readmission Risk Score: 10.5    Current PCP: Naya Michele, DO  PCP verified by CM? Yes    Chart Reviewed: Yes      History Provided by: Patient  Patient Orientation: Alert and Oriented, Person, Place, Situation    Patient Cognition: Alert    Hospitalization in the last 30 days (Readmission):  No    If yes, Readmission Assessment in CM Navigator will be completed.    Advance Directives:      Code Status: Full Code   Patient's Primary Decision Maker is: Legal Next of Kin    Primary Decision Maker: LioDarwin - Spouse - [phone]    Secondary Decision Maker: Jennifer Marsh - Child - [phone]    Discharge Planning:    Patient lives with: Spouse/Significant Other Type

## 2024-07-11 NOTE — PROGRESS NOTES
Physical Therapy  Physical Therapy Initial Assessment     Name: Karlie Vaca  : 1955  MRN: [id number]      Date of Service: 2024    Evaluating PT:  Sin Jones PT, DPT  [id number]    Room #:  6417/6417-B  Diagnosis:  Altered mental status [R41.82]  Urinary tract infection without hematuria, site unspecified [N39.0]  Altered mental status, unspecified altered mental status type [R41.82]  PMHx/PSHx:   has a past medical history of Acute leukemia (HCC), Breast cancer (HCC), Cancer (HCC), History of blood transfusion, and Hypertension.  Procedure/Surgery:    Precautions:  Falls, Cognition/ Alarm  Equipment Needs:      SUBJECTIVE:    Pt lives with spouse in a 2 story home. Pt home setup unknown as pt is questionable historian. Pt states she ambulates with no AD PTA  Equipment Owned:     OBJECTIVE:   Initial Evaluation  Date: 24 Treatment Short Term/ Long Term   Goals   AM-PAC 6 Clicks      Was pt agreeable to Eval/treatment? yes     Does pt have pain? No c/o pain     Bed Mobility  Rolling: SBA  Supine to sit: SBA  Sit to supine: SBA  Scooting: SBA  Rolling: Independent    Supine to sit: Independent    Sit to supine: Independent    Scooting: Independent     Transfers Sit to stand: Darwin  Stand to sit: Darwin  Stand pivot: Darwin no AD  Sit to stand: supervision  Stand to sit: supervision  Stand pivot: supervision   Ambulation    50 feet x 2  with Darwin no AD   300 feet with supervision AAD   Stair negotiation: ascended and descended  NT  4 steps with single rail supervision   ROM BUE:  Defer to OT  BLE:  WFL     Strength BUE:  Defer to OT  BLE:  4/5  Improve 1 MMT   Balance Sitting EOB:  SBA  Dynamic Standing:  SBA no AD  Sitting EOB:  Independent    Dynamic Standing:  superviison     Pt is A & O x self. Disoriented to place, time, and situation. Despite choices. Follows 1 step commands with constant cueing.  Sensation:  WNL  Edema:  WNL    Vitals:    HR 88  Spo2 97%     Therapeutic Exercises:  Functional

## 2024-07-11 NOTE — PROGRESS NOTES
Internal Medicine Progress Note    Patient's name: Karlie Vaca  : 1955  Chief complaints (on day of admission): Altered Mental Status (Altered mental patient alert to self. Onset since this morning.history of memory loss  stated short term. Working outside the past couple days )  Admission date: 2024  Date of service: 2024   Room: [address]  Primary care physician: Naya Michele DO  Reason for visit: Follow-up for AMS     Subjective  Karlie was seen and examined at bedside     Doing better and now afebrile since being on merrem   Will appreciate ID recs today   Ucx and bcx not growing anything as of yet   Reassuring that she is no longer spiking fevers  Likely not to need LP at this point   Spoke with    Patient much more awake and her color looks better     Review of Systems  There are no new complaints of chest pain, shortness of breath, abdominal pain, nausea, vomiting, diarrhea, constipation unless otherwise mentioned above.     Hospital Medications  Current Facility-Administered Medications   Medication Dose Route Frequency Provider Last Rate Last Admin    sacubitril-valsartan (ENTRESTO) 24-26 MG per tablet 1 tablet  1 tablet Oral BID Abdulaziz Dasilva PA-C   1 tablet at 07/10/24 2050    metoprolol succinate (TOPROL XL) extended release tablet 25 mg  25 mg Oral Daily Abdulaziz Dasilva, PA-C   25 mg at 07/10/24 1315    sodium chloride flush 0.9 % injection 10 mL  10 mL IntraVENous 2 times per day Abdulaziz Dasilva, PA-C   10 mL at 07/10/24 2050    sodium chloride flush 0.9 % injection 10 mL  10 mL IntraVENous PRN Abdulaziz Dasilva, PA-C        0.9 % sodium chloride infusion   IntraVENous PRN Abdulaziz Dasilva, PA-C        potassium chloride (KLOR-CON M) extended release tablet 40 mEq  40 mEq Oral PRN Brown, Abdulaziz, PA-C        Or    potassium bicarb-citric acid (EFFER-K) effervescent tablet 40 mEq  40 mEq Oral PRN Brown, Abdulaziz, PA-C        Or    potassium chloride 10 mEq/100 mL IVPB  (Peripheral Line)  10 mEq IntraVENous PRN Abdulaziz Dasilva PA-C        magnesium sulfate 2000 mg in 50 mL IVPB premix  2,000 mg IntraVENous PRN Abdulaziz Dasilva PA-C        [Held by provider] enoxaparin (LOVENOX) injection 40 mg  40 mg SubCUTAneous Daily Abdulaziz Dasilva PA-C        ondansetron (ZOFRAN-ODT) disintegrating tablet 4 mg  4 mg Oral Q8H PRN Abdulaziz Dasilva PA-C        Or    ondansetron (ZOFRAN) injection 4 mg  4 mg IntraVENous Q6H PRN Abdulaziz Dasilva PA-C        senna (SENOKOT) tablet 8.6 mg  1 tablet Oral Daily PRN Abdulaziz Dasilva PA-C        acetaminophen (TYLENOL) tablet 650 mg  650 mg Oral Q6H PRN Abdulaziz Dasilva PA-C        Or    acetaminophen (TYLENOL) suppository 650 mg  650 mg Rectal Q6H PRN Abdulaziz Dasilva PA-C        lactated ringers IV soln infusion   IntraVENous Continuous Vincent Boland  mL/hr at 07/11/24 0131 New Bag at 07/11/24 0131    spironolactone (ALDACTONE) tablet 12.5 mg  12.5 mg Oral Daily Abdulaziz Dasilva PA-C   12.5 mg at 07/10/24 1621    meropenem (MERREM) 500 mg in sodium chloride 0.9 % 100 mL IVPB ([id number])  500 mg IntraVENous Q8H Vincent Boland MD   Stopped at 07/11/24 0730       PRN Medications  sodium chloride flush, sodium chloride, potassium chloride **OR** potassium alternative oral replacement **OR** potassium chloride, magnesium sulfate, ondansetron **OR** ondansetron, senna, acetaminophen **OR** acetaminophen    Objective  Most Recent Recorded Vitals  BP (!) 136/91   Pulse 71   Temp 97.7 °F (36.5 °C) (Oral)   Resp 16   Ht 1.72 m (5' 7.72\")   Wt 80.9 kg (178 lb 4.8 oz)   SpO2 95%   BMI 27.34 kg/m²   I/O last 3 completed shifts:  In: 1623.1 [P.O.:550; I.V.:933.9; IV Piggyback:139.2]  Out: -   No intake/output data recorded.    Physical Exam:  General: AAO to person/place/time/purpose, NAD, no labored breathing  Eyes: conjunctivae/corneas clear, sclera non icteric  Skin: color/texture/turgor normal, no rashes or lesions  Lungs: CTAB, no retractions/use of

## 2024-07-11 NOTE — PLAN OF CARE
Problem: Chronic Conditions and Co-morbidities  Goal: Patient's chronic conditions and co-morbidity symptoms are monitored and maintained or improved  7/10/2024 2303 by Jesusita Causey RN  Outcome: Progressing  7/10/2024 2303 by Jesusita Causey RN  Outcome: Progressing  7/10/2024 1406 by Usama John RN  Outcome: Progressing     Problem: Discharge Planning  Goal: Discharge to home or other facility with appropriate resources  7/10/2024 2303 by Jesusita Causey RN  Outcome: Progressing  7/10/2024 2303 by Jesusita Causey RN  Outcome: Progressing  7/10/2024 1406 by Usama John RN  Outcome: Progressing     Problem: Safety - Adult  Goal: Free from fall injury  7/10/2024 2303 by Jesusita Causey RN  Outcome: Progressing  7/10/2024 2303 by Jesusita Causey RN  Outcome: Progressing  7/10/2024 1406 by Usama John RN  Outcome: Progressing     Problem: ABCDS Injury Assessment  Goal: Absence of physical injury  7/10/2024 2303 by Jesusita Causey RN  Outcome: Progressing  7/10/2024 2303 by Jesusita Causey RN  Outcome: Progressing     Problem: Pain  Goal: Verbalizes/displays adequate comfort level or baseline comfort level  7/10/2024 2303 by Jesusita Causey RN  Outcome: Progressing  7/10/2024 2303 by Jesusita Causey RN  Outcome: Progressing     Problem: Confusion  Goal: Confusion, delirium, dementia, or psychosis is improved or at baseline  Description: INTERVENTIONS:  1. Assess for possible contributors to thought disturbance, including medications, impaired vision or hearing, underlying metabolic abnormalities, dehydration, psychiatric diagnoses, and notify attending LIP  2. [place] high risk fall precautions, as indicated  3. Provide frequent short contacts to provide reality reorientation, refocusing and direction  4. Decrease environmental stimuli, including noise as appropriate  5. Monitor and intervene to maintain adequate nutrition, hydration, elimination, sleep and activity  6. If unable to ensure  safety without constant attention obtain sitter and review sitter guidelines with assigned personnel  7. Initiate Psychosocial CNS and Spiritual Care consult, as indicated  Outcome: Progressing

## 2024-07-11 NOTE — PLAN OF CARE
Problem: Chronic Conditions and Co-morbidities  Goal: Patient's chronic conditions and co-morbidity symptoms are monitored and maintained or improved  7/11/2024 1128 by Nati Brooke RN  Outcome: Progressing  7/10/2024 2303 by Jesusita Causey RN  Outcome: Progressing  7/10/2024 2303 by Jesusita Causey RN  Outcome: Progressing  Flowsheets (Taken 7/10/2024 1959)  Care Plan - Patient's Chronic Conditions and Co-Morbidity Symptoms are Monitored and Maintained or Improved: Monitor and assess patient's chronic conditions and comorbid symptoms for stability, deterioration, or improvement     Problem: Discharge Planning  Goal: Discharge to home or other facility with appropriate resources  7/11/2024 1128 by Nati Brooke RN  Outcome: Progressing  7/10/2024 2303 by Jesusita Causey RN  Outcome: Progressing  7/10/2024 2303 by Jesusita Causey RN  Outcome: Progressing  Flowsheets (Taken 7/10/2024 1959)  Discharge to home or other facility with appropriate resources: Identify barriers to discharge with patient and caregiver     Problem: Safety - Adult  Goal: Free from fall injury  7/11/2024 1128 by Nati Brooke RN  Outcome: Progressing  7/10/2024 2303 by Jesusita Causey RN  Outcome: Progressing  7/10/2024 2303 by Jesusita Causey RN  Outcome: Progressing     Problem: ABCDS Injury Assessment  Goal: Absence of physical injury  7/11/2024 1128 by Nati Brooke RN  Outcome: Progressing  7/10/2024 2303 by Jesusita Causey RN  Outcome: Progressing  7/10/2024 2303 by Jesusita Causey RN  Outcome: Progressing     Problem: Pain  Goal: Verbalizes/displays adequate comfort level or baseline comfort level  7/11/2024 1128 by Nati Brooke RN  Outcome: Progressing  7/10/2024 2303 by Jesusita Causey RN  Outcome: Progressing  7/10/2024 2303 by Jesusita Causey RN  Outcome: Progressing     Problem: Confusion  Goal: Confusion, delirium, dementia, or psychosis is improved or at baseline  Description:

## 2024-07-11 NOTE — ACP (ADVANCE CARE PLANNING)
Advance Care Planning   Healthcare Decision Maker:    Primary Decision Maker: Darwin Vaca - Spouse - [phone]    Secondary Decision Maker: MaximoJennifer - Child - [phone]    Click here to complete Healthcare Decision Makers including selection of the Healthcare Decision Maker Relationship (ie \"Primary\").  Today we documented Decision Maker(s) consistent with Legal Next of Kin hierarchy.       CM spoke with pt & her .  Pt is  & has 4 adult children.  Pt offered [facility] - declined.  Electronically signed by Urvashi Johns RN on 7/11/2024 at 1:06 PM

## 2024-07-12 VITALS
DIASTOLIC BLOOD PRESSURE: 88 MMHG | RESPIRATION RATE: 18 BRPM | HEIGHT: 68 IN | BODY MASS INDEX: 27.06 KG/M2 | OXYGEN SATURATION: 97 % | SYSTOLIC BLOOD PRESSURE: 145 MMHG | HEART RATE: 68 BPM | WEIGHT: 178.57 LBS | TEMPERATURE: 98.2 F

## 2024-07-12 LAB
ALBUMIN SERPL-MCNC: 3.3 G/DL (ref 3.5–5.2)
ALP SERPL-CCNC: 79 U/L (ref 35–104)
ALT SERPL-CCNC: 50 U/L (ref 0–32)
ANION GAP SERPL CALCULATED.3IONS-SCNC: 10 MMOL/L (ref 7–16)
AST SERPL-CCNC: 43 U/L (ref 0–31)
BASOPHILS # BLD: 0.03 K/UL (ref 0–0.2)
BASOPHILS NFR BLD: 1 % (ref 0–2)
BILIRUB SERPL-MCNC: 0.5 MG/DL (ref 0–1.2)
BUN SERPL-MCNC: 12 MG/DL (ref 6–23)
CALCIUM SERPL-MCNC: 9 MG/DL (ref 8.6–10.2)
CHLORIDE SERPL-SCNC: 103 MMOL/L (ref 98–107)
CO2 SERPL-SCNC: 25 MMOL/L (ref 22–29)
CREAT SERPL-MCNC: 1.1 MG/DL (ref 0.5–1)
EOSINOPHIL # BLD: 0.13 K/UL (ref 0.05–0.5)
EOSINOPHILS RELATIVE PERCENT: 3 % (ref 0–6)
ERYTHROCYTE [DISTWIDTH] IN BLOOD BY AUTOMATED COUNT: 12.9 % (ref 11.5–15)
GFR, ESTIMATED: 54 ML/MIN/1.73M2
GLUCOSE SERPL-MCNC: 99 MG/DL (ref 74–99)
HCT VFR BLD AUTO: 35.1 % (ref 34–48)
HGB BLD-MCNC: 12.1 G/DL (ref 11.5–15.5)
IMM GRANULOCYTES # BLD AUTO: <0.03 K/UL (ref 0–0.58)
IMM GRANULOCYTES NFR BLD: 0 % (ref 0–5)
LYMPHOCYTES NFR BLD: 0.97 K/UL (ref 1.5–4)
LYMPHOCYTES RELATIVE PERCENT: 21 % (ref 20–42)
MCH RBC QN AUTO: 31.1 PG (ref 26–35)
MCHC RBC AUTO-ENTMCNC: 34.5 G/DL (ref 32–34.5)
MCV RBC AUTO: 90.2 FL (ref 80–99.9)
MONOCYTES NFR BLD: 0.6 K/UL (ref 0.1–0.95)
MONOCYTES NFR BLD: 13 % (ref 2–12)
NEUTROPHILS NFR BLD: 62 % (ref 43–80)
NEUTS SEG NFR BLD: 2.85 K/UL (ref 1.8–7.3)
PLATELET # BLD AUTO: 221 K/UL (ref 130–450)
PMV BLD AUTO: 8.9 FL (ref 7–12)
POTASSIUM SERPL-SCNC: 3.7 MMOL/L (ref 3.5–5)
PROT SERPL-MCNC: 5.9 G/DL (ref 6.4–8.3)
RBC # BLD AUTO: 3.89 M/UL (ref 3.5–5.5)
SODIUM SERPL-SCNC: 138 MMOL/L (ref 132–146)
WBC OTHER # BLD: 4.6 K/UL (ref 4.5–11.5)

## 2024-07-12 PROCEDURE — 80053 COMPREHEN METABOLIC PANEL: CPT

## 2024-07-12 PROCEDURE — 85025 COMPLETE CBC W/AUTO DIFF WBC: CPT

## 2024-07-12 PROCEDURE — G0378 HOSPITAL OBSERVATION PER HR: HCPCS

## 2024-07-12 PROCEDURE — 6370000000 HC RX 637 (ALT 250 FOR IP): Performed by: INTERNAL MEDICINE

## 2024-07-12 PROCEDURE — 36415 COLL VENOUS BLD VENIPUNCTURE: CPT

## 2024-07-12 PROCEDURE — 2580000003 HC RX 258: Performed by: STUDENT IN AN ORGANIZED HEALTH CARE EDUCATION/TRAINING PROGRAM

## 2024-07-12 PROCEDURE — 6370000000 HC RX 637 (ALT 250 FOR IP): Performed by: PHYSICIAN ASSISTANT

## 2024-07-12 RX ORDER — SULFAMETHOXAZOLE AND TRIMETHOPRIM 800; 160 MG/1; MG/1
1 TABLET ORAL EVERY 12 HOURS SCHEDULED
Qty: 13 TABLET | Refills: 0 | Status: SHIPPED | OUTPATIENT
Start: 2024-07-12 | End: 2024-07-19

## 2024-07-12 RX ADMIN — SACUBITRIL AND VALSARTAN 1 TABLET: 24; 26 TABLET, FILM COATED ORAL at 09:32

## 2024-07-12 RX ADMIN — METOPROLOL SUCCINATE 25 MG: 25 TABLET, EXTENDED RELEASE ORAL at 09:32

## 2024-07-12 RX ADMIN — SULFAMETHOXAZOLE AND TRIMETHOPRIM 1 TABLET: 800; 160 TABLET ORAL at 09:33

## 2024-07-12 RX ADMIN — SODIUM CHLORIDE, POTASSIUM CHLORIDE, SODIUM LACTATE AND CALCIUM CHLORIDE: 600; 310; 30; 20 INJECTION, SOLUTION INTRAVENOUS at 05:11

## 2024-07-12 NOTE — PLAN OF CARE
Problem: Chronic Conditions and Co-morbidities  Goal: Patient's chronic conditions and co-morbidity symptoms are monitored and maintained or improved  7/12/2024 1106 by Nati Brooke RN  Outcome: Progressing  7/11/2024 2221 by Jesusita Causey RN  Outcome: Progressing  Flowsheets (Taken 7/11/2024 2000)  Care Plan - Patient's Chronic Conditions and Co-Morbidity Symptoms are Monitored and Maintained or Improved: Monitor and assess patient's chronic conditions and comorbid symptoms for stability, deterioration, or improvement     Problem: Discharge Planning  Goal: Discharge to home or other facility with appropriate resources  7/12/2024 1106 by Nati Brooke RN  Outcome: Progressing  7/11/2024 2221 by Jesusita Causey RN  Outcome: Progressing  Flowsheets (Taken 7/11/2024 2000)  Discharge to home or other facility with appropriate resources: Identify barriers to discharge with patient and caregiver     Problem: Safety - Adult  Goal: Free from fall injury  7/12/2024 1106 by Nati Brooke RN  Outcome: Progressing  7/11/2024 2221 by Jesusita Causey RN  Outcome: Progressing     Problem: ABCDS Injury Assessment  Goal: Absence of physical injury  7/12/2024 1106 by Nati Brooke RN  Outcome: Progressing  7/11/2024 2221 by Jesusita Causey RN  Outcome: Progressing     Problem: Pain  Goal: Verbalizes/displays adequate comfort level or baseline comfort level  7/12/2024 1106 by Nati Brooke RN  Outcome: Progressing  7/11/2024 2221 by Jesusita Causey RN  Outcome: Progressing     Problem: Confusion  Goal: Confusion, delirium, dementia, or psychosis is improved or at baseline  Description: INTERVENTIONS:  1. Assess for possible contributors to thought disturbance, including medications, impaired vision or hearing, underlying metabolic abnormalities, dehydration, psychiatric diagnoses, and notify attending LIP  2. [place] high risk fall precautions, as indicated  3. Provide frequent short

## 2024-07-12 NOTE — PROGRESS NOTES
Education given to  of patient of proper tricia care after incontinent and voiding episodes. All questions answered.  verbalized understanding of the importance of keeping that are clean.     Nati Brooke RN

## 2024-07-12 NOTE — PROGRESS NOTES
CLINICAL PHARMACY NOTE: MEDS TO BEDS    Total # of Prescriptions Filled: 1   The following medications were delivered to the patient:  Bactrim 800-160mg    Additional Documentation:  Patient's  picked up in pharmacy 7-12-24

## 2024-07-12 NOTE — PLAN OF CARE
Problem: Chronic Conditions and Co-morbidities  Goal: Patient's chronic conditions and co-morbidity symptoms are monitored and maintained or improved  7/12/2024 1131 by Nati Brooke RN  Outcome: Completed  7/12/2024 1106 by Nati Brooke RN  Outcome: Progressing  7/11/2024 2221 by Jesusita Causey RN  Outcome: Progressing  Flowsheets (Taken 7/11/2024 2000)  Care Plan - Patient's Chronic Conditions and Co-Morbidity Symptoms are Monitored and Maintained or Improved: Monitor and assess patient's chronic conditions and comorbid symptoms for stability, deterioration, or improvement     Problem: Discharge Planning  Goal: Discharge to home or other facility with appropriate resources  7/12/2024 1131 by Nati Brooke RN  Outcome: Completed  7/12/2024 1106 by Nati Brooke RN  Outcome: Progressing  7/11/2024 2221 by Jesusita Causey RN  Outcome: Progressing  Flowsheets (Taken 7/11/2024 2000)  Discharge to home or other facility with appropriate resources: Identify barriers to discharge with patient and caregiver     Problem: Safety - Adult  Goal: Free from fall injury  7/12/2024 1131 by Nati Brooke RN  Outcome: Completed  7/12/2024 1106 by Nati Brooke RN  Outcome: Progressing  7/11/2024 2221 by Jesusita Causey RN  Outcome: Progressing     Problem: ABCDS Injury Assessment  Goal: Absence of physical injury  7/12/2024 1131 by Nati Brooke RN  Outcome: Completed  7/12/2024 1106 by Nati Brooke RN  Outcome: Progressing  7/11/2024 2221 by Jesusita Causey RN  Outcome: Progressing     Problem: Pain  Goal: Verbalizes/displays adequate comfort level or baseline comfort level  7/12/2024 1131 by Nati Brooke RN  Outcome: Completed  7/12/2024 1106 by Nati Brooke RN  Outcome: Progressing  7/11/2024 2221 by Jesusita Causey RN  Outcome: Progressing     Problem: Confusion  Goal: Confusion, delirium, dementia, or psychosis is improved or at baseline  Description:  INTERVENTIONS:  1. Assess for possible contributors to thought disturbance, including medications, impaired vision or hearing, underlying metabolic abnormalities, dehydration, psychiatric diagnoses, and notify attending LIP  2. [place] high risk fall precautions, as indicated  3. Provide frequent short contacts to provide reality reorientation, refocusing and direction  4. Decrease environmental stimuli, including noise as appropriate  5. Monitor and intervene to maintain adequate nutrition, hydration, elimination, sleep and activity  6. If unable to ensure safety without constant attention obtain sitter and review sitter guidelines with assigned personnel  7. Initiate Psychosocial CNS and Spiritual Care consult, as indicated  7/12/2024 1131 by Nati Brooke, RN  Outcome: Completed  7/12/2024 1106 by Nati Brooke, RN  Outcome: Progressing  7/11/2024 2221 by Jesusita Causey, RN  Outcome: Progressing

## 2024-07-12 NOTE — PLAN OF CARE
Problem: Chronic Conditions and Co-morbidities  Goal: Patient's chronic conditions and co-morbidity symptoms are monitored and maintained or improved  7/11/2024 2221 by Jesusita Causey RN  Outcome: Progressing  Flowsheets (Taken 7/11/2024 2000)  Care Plan - Patient's Chronic Conditions and Co-Morbidity Symptoms are Monitored and Maintained or Improved: Monitor and assess patient's chronic conditions and comorbid symptoms for stability, deterioration, or improvement  7/11/2024 1128 by Nati Brooke RN  Outcome: Progressing     Problem: Discharge Planning  Goal: Discharge to home or other facility with appropriate resources  7/11/2024 2221 by Jesusita Causey RN  Outcome: Progressing  Flowsheets (Taken 7/11/2024 2000)  Discharge to home or other facility with appropriate resources: Identify barriers to discharge with patient and caregiver  7/11/2024 1128 by Nati Brooke RN  Outcome: Progressing     Problem: Safety - Adult  Goal: Free from fall injury  7/11/2024 2221 by Jesusita Causey RN  Outcome: Progressing  7/11/2024 1128 by Nati Brooke RN  Outcome: Progressing     Problem: ABCDS Injury Assessment  Goal: Absence of physical injury  7/11/2024 2221 by Jesusita Causey RN  Outcome: Progressing  7/11/2024 1128 by Nati Brooke RN  Outcome: Progressing     Problem: Pain  Goal: Verbalizes/displays adequate comfort level or baseline comfort level  7/11/2024 2221 by Jesusita Causey RN  Outcome: Progressing  7/11/2024 1128 by Nati Brooke RN  Outcome: Progressing     Problem: Confusion  Goal: Confusion, delirium, dementia, or psychosis is improved or at baseline  Description: INTERVENTIONS:  1. Assess for possible contributors to thought disturbance, including medications, impaired vision or hearing, underlying metabolic abnormalities, dehydration, psychiatric diagnoses, and notify attending LIP  2. [place] high risk fall precautions, as indicated  3. Provide frequent short

## 2024-07-14 LAB
MICROORGANISM SPEC CULT: NORMAL
MICROORGANISM SPEC CULT: NORMAL
SERVICE CMNT-IMP: NORMAL
SERVICE CMNT-IMP: NORMAL
SPECIMEN DESCRIPTION: NORMAL
SPECIMEN DESCRIPTION: NORMAL

## 2024-07-14 NOTE — DISCHARGE SUMMARY
acute osseous or soft tissue findings.     1. Bilateral prominence of the renal collecting systems right greater than left without nephrolithiasis or urolithiasis. Correlate with urinalysis as there is potential minimal inflammatory stranding adjacent to the right proximal renal collecting system without stone disease. 2. Trace perihepatic ascites. 3. Mild to moderate distension of the urinary bladder without bladder wall thickening.     CT HEAD WO CONTRAST    Result Date: 7/9/2024  EXAMINATION: CT OF THE HEAD WITHOUT CONTRAST  7/9/2024 11:05 pm TECHNIQUE: CT of the head was performed without the administration of intravenous contrast. Automated exposure control, iterative reconstruction, and/or weight based adjustment of the mA/kV was utilized to reduce the radiation dose to as low as reasonably achievable. COMPARISON: None. HISTORY: ORDERING SYSTEM PROVIDED HISTORY: Evaluate intracranial abnormality TECHNOLOGIST PROVIDED HISTORY: Has a \"code stroke\" or \"stroke alert\" been called?->No Reason for exam:->Evaluate intracranial abnormality Decision Support Exception - unselect if not a suspected or confirmed emergency medical condition->Emergency Medical Condition (MA) What reading provider will be dictating this exam?->CRC FINDINGS: BRAIN/VENTRICLES: There is no acute intracranial hemorrhage, mass effect or midline shift.  No abnormal extra-axial fluid collection.  The gray-white differentiation is maintained without evidence of an acute infarct.  There is no evidence of hydrocephalus. ORBITS: The visualized portion of the orbits demonstrate no acute abnormality. SINUSES: The visualized paranasal sinuses and mastoid air cells demonstrate no acute abnormality. SOFT TISSUES/SKULL:  No acute abnormality of the visualized skull or soft tissues.     No acute intracranial abnormality.     XR CHEST PORTABLE    Result Date: 7/9/2024  EXAMINATION: ONE XRAY VIEW OF THE CHEST 7/9/2024 9:47 pm COMPARISON: 03/03/2024 HISTORY:  cream  Commonly known as: DOVONEX     donepezil 5 MG tablet  Commonly known as: ARICEPT     pimecrolimus 1 % cream  Commonly known as: ELIDEL               Where to Get Your Medications        These medications were sent to [facility] [place] [address] [address] - P [phone] - F [phone]  [facility] [address], [place] [address]      Phone: [phone]   sulfamethoxazole-trimethoprim 800-160 MG per tablet         Discharge Medication List as of 7/12/2024 11:58 AM        Discharge Medication List as of 7/12/2024 11:58 AM        STOP taking these medications       donepezil (ARICEPT) 5 MG tablet Comments:   Reason for Stopping:         pimecrolimus (ELIDEL) 1 % cream Comments:   Reason for Stopping:         calcipotriene (DOVONEX) 0.005 % cream Comments:   Reason for Stopping:             Discharge Medication List as of 7/12/2024 11:58 AM        START taking these medications    Details   sulfamethoxazole-trimethoprim (BACTRIM DS;SEPTRA DS) 800-160 MG per tablet Take 1 tablet by mouth every 12 hours for 13 doses, Disp-13 tablet, R-0Normal             INTERNAL MEDICINE FOLLOW UP/INSTRUCTIONS:  Follow-up with primary care physician within 1 week of discharge from hospital  Please review changes to pre-hospital admission medications and prescriptions for new medications upon discharge from the hospital with PCP  Please review results of labs and imaging studies with PCP  Follow-up with consultants as directed by them   If recurrence or worsening of symptoms please call primary care physician or return to the ER immediately  Diet: No diet orders on file    Preparing for this patient's discharge, including paperwork, orders, instructions, and meeting with patient did required >35 minutes.    Electronically signed by Vincent Boland MD on 7/14/2024 at 2:57 PM

## 2024-07-15 ENCOUNTER — HOSPITAL ENCOUNTER (OUTPATIENT)
Age: 69
Discharge: HOME | End: 2024-07-15
Payer: COMMERCIAL

## 2024-07-15 DIAGNOSIS — N10 ACUTE PYELONEPHRITIS: ICD-10-CM

## 2024-07-15 LAB
ANION GAP SERPL CALCULATED.3IONS-SCNC: 12 MMOL/L (ref 7–16)
BUN SERPL-MCNC: 20 MG/DL (ref 6–23)
CALCIUM SERPL-MCNC: 9.6 MG/DL (ref 8.6–10.2)
CHLORIDE SERPL-SCNC: 104 MMOL/L (ref 98–107)
CO2 SERPL-SCNC: 23 MMOL/L (ref 22–29)
CREAT SERPL-MCNC: 1.7 MG/DL (ref 0.5–1)
GFR, ESTIMATED: 33 ML/MIN/1.73M2
GLUCOSE SERPL-MCNC: 82 MG/DL (ref 74–99)
POTASSIUM SERPL-SCNC: 4.7 MMOL/L (ref 3.5–5)
SODIUM SERPL-SCNC: 139 MMOL/L (ref 132–146)

## 2024-07-15 PROCEDURE — 80048 BASIC METABOLIC PNL TOTAL CA: CPT

## 2024-07-15 PROCEDURE — 36415 COLL VENOUS BLD VENIPUNCTURE: CPT

## 2024-07-24 NOTE — PROGRESS NOTES
Physician Progress Note      PATIENT:               ERINN LAKHANI  [facility] #:                  [id number]  :                       1955  ADMIT DATE:       2024 6:48 PM  DISCH DATE:        2024 12:49 PM  RESPONDING  PROVIDER #:        Vincent Careron MD          QUERY TEXT:    Pt admitted with UTI. Pt noted to have low Temp & AMS. If possible, please   document in the progress notes and discharge summary if you are evaluating and   /or treating any of the following:  The medical record reflects the following:  Risk Factors: UTI  Clinical Indicators: Altered mental status on admission and T 103  Procal 0.72    Urine c/s Staph saprophyticus  Treatment: IV Merrem and discharged on Bactrim x 7 days.  Thanks, DARBY Sotelo  [phone]  Options provided:  -- Sepsis related to UTI, present on admission  -- UTI without Sepsis  -- Other - I will add my own diagnosis  -- Disagree - Not applicable / Not valid  -- Disagree - Clinically unable to determine / Unknown  -- Refer to Clinical Documentation Reviewer    PROVIDER RESPONSE TEXT:    This patient has sepsis related to UTI which was present on admission.    Query created by: Sonja Kimbrough on 2024 12:06 PM      QUERY TEXT:    Pt noted to have altered mental status in the setting of a UTI and   dehydration. If possible, please document in the progress notes and discharge   summary if you are evaluating and / or treating any of the following:    The medical record reflects the following:  Risk Factors: AMS, UTI, Dehydration  Clinical Indicators: Altered mental status that improved to alert and oriented   x 4 by discharge.  Treatment: IV Merrem, IVF, po Bactrim at dc.  Thanks, DARBY Sotelo  [phone]  Options provided:  -- Metabolic encephalopathy  -- Acute confusional state due to UTI  -- Other - I will add my own diagnosis  -- Disagree - Not applicable / Not valid  -- Disagree - Clinically unable to determine / Unknown  -- Refer to

## 2024-08-09 PROBLEM — N39.0 UTI (URINARY TRACT INFECTION): Status: RESOLVED | Noted: 2024-07-10 | Resolved: 2024-08-09

## 2024-10-29 ENCOUNTER — APPOINTMENT (OUTPATIENT)
Dept: HEMATOLOGY/ONCOLOGY | Facility: CLINIC | Age: 69
End: 2024-10-29
Payer: COMMERCIAL

## 2024-11-05 ENCOUNTER — APPOINTMENT (OUTPATIENT)
Dept: HEMATOLOGY/ONCOLOGY | Facility: CLINIC | Age: 69
End: 2024-11-05
Payer: COMMERCIAL

## 2024-12-17 ENCOUNTER — APPOINTMENT (OUTPATIENT)
Dept: CT IMAGING | Age: 69
End: 2024-12-17
Payer: COMMERCIAL

## 2024-12-17 ENCOUNTER — HOSPITAL ENCOUNTER (EMERGENCY)
Age: 69
Discharge: HOME | End: 2024-12-17
Attending: EMERGENCY MEDICINE
Payer: COMMERCIAL

## 2024-12-17 ENCOUNTER — APPOINTMENT (OUTPATIENT)
Dept: GENERAL RADIOLOGY | Age: 69
End: 2024-12-17
Payer: COMMERCIAL

## 2024-12-17 VITALS
WEIGHT: 178 LBS | HEART RATE: 55 BPM | RESPIRATION RATE: 16 BRPM | DIASTOLIC BLOOD PRESSURE: 84 MMHG | BODY MASS INDEX: 26.98 KG/M2 | TEMPERATURE: 98.5 F | HEIGHT: 68 IN | OXYGEN SATURATION: 100 % | SYSTOLIC BLOOD PRESSURE: 149 MMHG

## 2024-12-17 DIAGNOSIS — R53.1 GENERALIZED WEAKNESS: Primary | ICD-10-CM

## 2024-12-17 LAB
ALBUMIN SERPL-MCNC: 4.6 G/DL (ref 3.5–5.2)
ALP SERPL-CCNC: 83 U/L (ref 35–104)
ALT SERPL-CCNC: 17 U/L (ref 0–32)
ANION GAP SERPL CALCULATED.3IONS-SCNC: 10 MMOL/L (ref 7–16)
AST SERPL-CCNC: 29 U/L (ref 0–31)
BACTERIA URNS QL MICRO: ABNORMAL
BASOPHILS # BLD: 0.06 K/UL (ref 0–0.2)
BASOPHILS NFR BLD: 1 % (ref 0–2)
BILIRUB SERPL-MCNC: 0.5 MG/DL (ref 0–1.2)
BILIRUB UR QL STRIP: NEGATIVE
BNP SERPL-MCNC: 728 PG/ML (ref 0–125)
BUN SERPL-MCNC: 22 MG/DL (ref 6–23)
CALCIUM SERPL-MCNC: 10.4 MG/DL (ref 8.6–10.2)
CHLORIDE SERPL-SCNC: 104 MMOL/L (ref 98–107)
CLARITY UR: CLEAR
CO2 SERPL-SCNC: 27 MMOL/L (ref 22–29)
COLOR UR: YELLOW
CREAT SERPL-MCNC: 1.3 MG/DL (ref 0.5–1)
EOSINOPHIL # BLD: 0.19 K/UL (ref 0.05–0.5)
EOSINOPHILS RELATIVE PERCENT: 4 % (ref 0–6)
EPI CELLS #/AREA URNS HPF: ABNORMAL /HPF
ERYTHROCYTE [DISTWIDTH] IN BLOOD BY AUTOMATED COUNT: 12.8 % (ref 11.5–15)
GFR, ESTIMATED: 46 ML/MIN/1.73M2
GLUCOSE SERPL-MCNC: 90 MG/DL (ref 74–99)
GLUCOSE UR STRIP-MCNC: NEGATIVE MG/DL
HCT VFR BLD AUTO: 43.6 % (ref 34–48)
HGB BLD-MCNC: 14.8 G/DL (ref 11.5–15.5)
HGB UR QL STRIP.AUTO: NEGATIVE
IMM GRANULOCYTES # BLD AUTO: <0.03 K/UL (ref 0–0.58)
IMM GRANULOCYTES NFR BLD: 0 % (ref 0–5)
INFLUENZA A BY PCR: NOT DETECTED
INFLUENZA B BY PCR: NOT DETECTED
KETONES UR STRIP-MCNC: NEGATIVE MG/DL
LACTATE BLDV-SCNC: 1.1 MMOL/L (ref 0.5–2.2)
LEUKOCYTE ESTERASE UR QL STRIP: NEGATIVE
LYMPHOCYTES NFR BLD: 1.43 K/UL (ref 1.5–4)
LYMPHOCYTES RELATIVE PERCENT: 28 % (ref 20–42)
MCH RBC QN AUTO: 30.8 PG (ref 26–35)
MCHC RBC AUTO-ENTMCNC: 33.9 G/DL (ref 32–34.5)
MCV RBC AUTO: 90.8 FL (ref 80–99.9)
MONOCYTES NFR BLD: 0.5 K/UL (ref 0.1–0.95)
MONOCYTES NFR BLD: 10 % (ref 2–12)
NEUTROPHILS NFR BLD: 58 % (ref 43–80)
NEUTS SEG NFR BLD: 3.01 K/UL (ref 1.8–7.3)
NITRITE UR QL STRIP: NEGATIVE
PH UR STRIP: 7.5 [PH] (ref 5–9)
PLATELET # BLD AUTO: 289 K/UL (ref 130–450)
PMV BLD AUTO: 8.4 FL (ref 7–12)
POTASSIUM SERPL-SCNC: 4.1 MMOL/L (ref 3.5–5)
PROT SERPL-MCNC: 7.4 G/DL (ref 6.4–8.3)
PROT UR STRIP-MCNC: NEGATIVE MG/DL
RBC # BLD AUTO: 4.8 M/UL (ref 3.5–5.5)
RBC #/AREA URNS HPF: ABNORMAL /HPF
SARS-COV-2 RDRP RESP QL NAA+PROBE: NOT DETECTED
SODIUM SERPL-SCNC: 141 MMOL/L (ref 132–146)
SP GR UR STRIP: 1.01 (ref 1–1.03)
SPECIMEN DESCRIPTION: NORMAL
TROPONIN I SERPL HS-MCNC: 17 NG/L (ref 0–9)
TROPONIN I SERPL HS-MCNC: 18 NG/L (ref 0–9)
UROBILINOGEN UR STRIP-ACNC: 0.2 EU/DL (ref 0–1)
WBC #/AREA URNS HPF: ABNORMAL /HPF
WBC OTHER # BLD: 5.2 K/UL (ref 4.5–11.5)

## 2024-12-17 PROCEDURE — 83605 ASSAY OF LACTIC ACID: CPT

## 2024-12-17 PROCEDURE — 83880 ASSAY OF NATRIURETIC PEPTIDE: CPT

## 2024-12-17 PROCEDURE — 80053 COMPREHEN METABOLIC PANEL: CPT

## 2024-12-17 PROCEDURE — 87502 INFLUENZA DNA AMP PROBE: CPT

## 2024-12-17 PROCEDURE — 87086 URINE CULTURE/COLONY COUNT: CPT

## 2024-12-17 PROCEDURE — 70450 CT HEAD/BRAIN W/O DYE: CPT

## 2024-12-17 PROCEDURE — 99285 EMERGENCY DEPT VISIT HI MDM: CPT

## 2024-12-17 PROCEDURE — 81001 URINALYSIS AUTO W/SCOPE: CPT

## 2024-12-17 PROCEDURE — 85025 COMPLETE CBC W/AUTO DIFF WBC: CPT

## 2024-12-17 PROCEDURE — 71046 X-RAY EXAM CHEST 2 VIEWS: CPT

## 2024-12-17 PROCEDURE — 84484 ASSAY OF TROPONIN QUANT: CPT

## 2024-12-17 PROCEDURE — 87635 SARS-COV-2 COVID-19 AMP PRB: CPT

## 2024-12-17 PROCEDURE — 93005 ELECTROCARDIOGRAM TRACING: CPT | Performed by: PHYSICIAN ASSISTANT

## 2024-12-17 ASSESSMENT — LIFESTYLE VARIABLES
HOW OFTEN DO YOU HAVE A DRINK CONTAINING ALCOHOL: NEVER
HOW MANY STANDARD DRINKS CONTAINING ALCOHOL DO YOU HAVE ON A TYPICAL DAY: PATIENT DOES NOT DRINK

## 2024-12-17 NOTE — ED TRIAGE NOTES
Department of Emergency Medicine    FIRST PROVIDER TRIAGE NOTE             Independent MLP           12/17/24  2:16 PM EST    Date of Encounter: 12/17/24   MRN: [id number]    Vitals:    12/17/24 1359   BP: (!) 149/84   Pulse: 55   Resp: 16   Temp: 98.5 °F (36.9 °C)   TempSrc: Oral   SpO2: 100%   Weight: 80.7 kg (178 lb)   Height: 1.727 m (5' 8\")      HPI: Karlie Vaca is a 69 y.o. female who presents to the ED for Fatigue (Pt having increased fatigue for last couple days ) and Shortness of Breath     ROS: Negative for abd pain or back pain.    Physical Exam:   Gen Appearance/Constitutional: alert  CV: regular rate     Initial Plan of Care: All treatment areas with department are currently occupied.     Plan to order/Initiate the following while awaiting opening in ED: Triage evaluation  EKG and imaging studies.    Provider-Patient relationship only established for Provider In Triage (PIT).  Full assessment, HPI, and examination not performed, therefore, it is not yet possible to state whether or not an emergency medical condition exists.  This provider not responsible to follow or interpret any labs or testing ordered in triage.  Supervisor request for GUILLERMINA to initiate contact and input an assessment note in triage during high volume surges.     Initial Plan of Care: Initiate Treatment-Testing, Proceed toTreatment Area When Bed Available for ED Attending/MLP to Continue Care  Secondary to high volume, low staffing, and/or boarding- patient to await bed availability.    This ends my PIT-Patient relationship.  Care of patient relinquished after triage    Electronically signed by Chitra Thayer PA-C   DD: 12/17/24

## 2024-12-18 LAB
MICROORGANISM SPEC CULT: ABNORMAL
MICROORGANISM SPEC CULT: ABNORMAL
SERVICE CMNT-IMP: ABNORMAL
SPECIMEN DESCRIPTION: ABNORMAL

## 2024-12-18 NOTE — ED PROVIDER NOTES
12/17/2024  9:19 PM                 (Please note that portions of this note were completed with a voice recognition program.  Efforts were made to edit the dictations but occasionally words are mis-transcribed.)    Sotero Delgadillo II, DO (electronically signed)          5

## 2024-12-20 LAB
EKG ATRIAL RATE: 53 BPM
EKG P AXIS: 52 DEGREES
EKG P-R INTERVAL: 172 MS
EKG Q-T INTERVAL: 504 MS
EKG QRS DURATION: 154 MS
EKG QTC CALCULATION (BAZETT): 472 MS
EKG R AXIS: -57 DEGREES
EKG T AXIS: 71 DEGREES
EKG VENTRICULAR RATE: 53 BPM

## 2024-12-20 PROCEDURE — 93010 ELECTROCARDIOGRAM REPORT: CPT | Performed by: INTERNAL MEDICINE

## 2025-01-24 ENCOUNTER — APPOINTMENT (OUTPATIENT)
Dept: GENERAL RADIOLOGY | Age: 70
DRG: 872 | End: 2025-01-24
Payer: MEDICARE

## 2025-01-24 ENCOUNTER — HOSPITAL ENCOUNTER (INPATIENT)
Age: 70
LOS: 3 days | Discharge: HOME | DRG: 872 | End: 2025-01-27
Attending: STUDENT IN AN ORGANIZED HEALTH CARE EDUCATION/TRAINING PROGRAM | Admitting: STUDENT IN AN ORGANIZED HEALTH CARE EDUCATION/TRAINING PROGRAM
Payer: MEDICARE

## 2025-01-24 ENCOUNTER — APPOINTMENT (OUTPATIENT)
Dept: CT IMAGING | Age: 70
DRG: 872 | End: 2025-01-24
Payer: MEDICARE

## 2025-01-24 DIAGNOSIS — I48.91 ATRIAL FIBRILLATION, UNSPECIFIED TYPE (HCC): ICD-10-CM

## 2025-01-24 DIAGNOSIS — I48.91 ATRIAL FIBRILLATION WITH RAPID VENTRICULAR RESPONSE (HCC): Primary | ICD-10-CM
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
## 2025-01-24 DIAGNOSIS — J10.1 INFLUENZA A: ICD-10-CM

## 2025-01-24 DIAGNOSIS — M54.50 ACUTE BILATERAL LOW BACK PAIN WITHOUT SCIATICA: ICD-10-CM

## 2025-01-24 LAB
ALBUMIN SERPL-MCNC: 3.9 G/DL (ref 3.5–5.2)
ALP SERPL-CCNC: 84 U/L (ref 35–104)
ALT SERPL-CCNC: 23 U/L (ref 0–32)
ANION GAP SERPL CALCULATED.3IONS-SCNC: 12 MMOL/L (ref 7–16)
AST SERPL-CCNC: 35 U/L (ref 0–31)
BASOPHILS # BLD: 0.02 K/UL (ref 0–0.2)
BASOPHILS NFR BLD: 0 % (ref 0–2)
BILIRUB SERPL-MCNC: 0.3 MG/DL (ref 0–1.2)
BNP SERPL-MCNC: 1212 PG/ML (ref 0–125)
BUN SERPL-MCNC: 18 MG/DL (ref 6–23)
CALCIUM SERPL-MCNC: 8.9 MG/DL (ref 8.6–10.2)
CHLORIDE SERPL-SCNC: 98 MMOL/L (ref 98–107)
CO2 SERPL-SCNC: 25 MMOL/L (ref 22–29)
CREAT SERPL-MCNC: 1.6 MG/DL (ref 0.5–1)
EOSINOPHIL # BLD: 0.05 K/UL (ref 0.05–0.5)
EOSINOPHILS RELATIVE PERCENT: 1 % (ref 0–6)
ERYTHROCYTE [DISTWIDTH] IN BLOOD BY AUTOMATED COUNT: 13 % (ref 11.5–15)
GFR, ESTIMATED: 35 ML/MIN/1.73M2
GLUCOSE SERPL-MCNC: 102 MG/DL (ref 74–99)
HCT VFR BLD AUTO: 40.1 % (ref 34–48)
HGB BLD-MCNC: 13 G/DL (ref 11.5–15.5)
IMM GRANULOCYTES # BLD AUTO: <0.03 K/UL (ref 0–0.58)
IMM GRANULOCYTES NFR BLD: 0 % (ref 0–5)
INFLUENZA A BY PCR: DETECTED
INFLUENZA B BY PCR: NOT DETECTED
LYMPHOCYTES NFR BLD: 0.53 K/UL (ref 1.5–4)
LYMPHOCYTES RELATIVE PERCENT: 11 % (ref 20–42)
MAGNESIUM SERPL-MCNC: 1.9 MG/DL (ref 1.6–2.6)
MCH RBC QN AUTO: 30.6 PG (ref 26–35)
MCHC RBC AUTO-ENTMCNC: 32.4 G/DL (ref 32–34.5)
MCV RBC AUTO: 94.4 FL (ref 80–99.9)
MONOCYTES NFR BLD: 0.65 K/UL (ref 0.1–0.95)
MONOCYTES NFR BLD: 14 % (ref 2–12)
NEUTROPHILS NFR BLD: 74 % (ref 43–80)
NEUTS SEG NFR BLD: 3.54 K/UL (ref 1.8–7.3)
PLATELET # BLD AUTO: 255 K/UL (ref 130–450)
PMV BLD AUTO: 8.4 FL (ref 7–12)
POTASSIUM SERPL-SCNC: 3.6 MMOL/L (ref 3.5–5)
PROT SERPL-MCNC: 6.8 G/DL (ref 6.4–8.3)
RBC # BLD AUTO: 4.25 M/UL (ref 3.5–5.5)
RBC # BLD: NORMAL 10*6/UL
RSV BY PCR: NOT DETECTED
SARS-COV-2 RDRP RESP QL NAA+PROBE: NOT DETECTED
SODIUM SERPL-SCNC: 135 MMOL/L (ref 132–146)
SPECIMEN DESCRIPTION: NORMAL
SPECIMEN SOURCE: NORMAL
TROPONIN I SERPL HS-MCNC: 27 NG/L (ref 0–9)
TROPONIN I SERPL HS-MCNC: 32 NG/L (ref 0–9)
TSH SERPL DL<=0.05 MIU/L-ACNC: 2.29 UIU/ML (ref 0.27–4.2)
WBC OTHER # BLD: 4.8 K/UL (ref 4.5–11.5)

## 2025-01-24 PROCEDURE — 80053 COMPREHEN METABOLIC PANEL: CPT

## 2025-01-24 PROCEDURE — APPSS180 APP SPLIT SHARED TIME > 60 MINUTES: Performed by: NURSE PRACTITIONER

## 2025-01-24 PROCEDURE — 72131 CT LUMBAR SPINE W/O DYE: CPT

## 2025-01-24 PROCEDURE — 2580000003 HC RX 258: Performed by: STUDENT IN AN ORGANIZED HEALTH CARE EDUCATION/TRAINING PROGRAM

## 2025-01-24 PROCEDURE — 1200000000 HC SEMI PRIVATE

## 2025-01-24 PROCEDURE — 96365 THER/PROPH/DIAG IV INF INIT: CPT

## 2025-01-24 PROCEDURE — 83880 ASSAY OF NATRIURETIC PEPTIDE: CPT

## 2025-01-24 PROCEDURE — 6360000002 HC RX W HCPCS: Performed by: INTERNAL MEDICINE

## 2025-01-24 PROCEDURE — 6370000000 HC RX 637 (ALT 250 FOR IP): Performed by: EMERGENCY MEDICINE

## 2025-01-24 PROCEDURE — 87634 RSV DNA/RNA AMP PROBE: CPT

## 2025-01-24 PROCEDURE — 85025 COMPLETE CBC W/AUTO DIFF WBC: CPT

## 2025-01-24 PROCEDURE — 2580000003 HC RX 258: Performed by: EMERGENCY MEDICINE

## 2025-01-24 PROCEDURE — 6370000000 HC RX 637 (ALT 250 FOR IP): Performed by: STUDENT IN AN ORGANIZED HEALTH CARE EDUCATION/TRAINING PROGRAM

## 2025-01-24 PROCEDURE — 6360000002 HC RX W HCPCS: Performed by: EMERGENCY MEDICINE

## 2025-01-24 PROCEDURE — 84443 ASSAY THYROID STIM HORMONE: CPT

## 2025-01-24 PROCEDURE — 70450 CT HEAD/BRAIN W/O DYE: CPT

## 2025-01-24 PROCEDURE — 99222 1ST HOSP IP/OBS MODERATE 55: CPT | Performed by: INTERNAL MEDICINE

## 2025-01-24 PROCEDURE — 96375 TX/PRO/DX INJ NEW DRUG ADDON: CPT

## 2025-01-24 PROCEDURE — 87635 SARS-COV-2 COVID-19 AMP PRB: CPT

## 2025-01-24 PROCEDURE — 99222 1ST HOSP IP/OBS MODERATE 55: CPT | Performed by: STUDENT IN AN ORGANIZED HEALTH CARE EDUCATION/TRAINING PROGRAM

## 2025-01-24 PROCEDURE — 2500000003 HC RX 250 WO HCPCS: Performed by: STUDENT IN AN ORGANIZED HEALTH CARE EDUCATION/TRAINING PROGRAM

## 2025-01-24 PROCEDURE — 87502 INFLUENZA DNA AMP PROBE: CPT

## 2025-01-24 PROCEDURE — 93005 ELECTROCARDIOGRAM TRACING: CPT | Performed by: STUDENT IN AN ORGANIZED HEALTH CARE EDUCATION/TRAINING PROGRAM

## 2025-01-24 PROCEDURE — 84484 ASSAY OF TROPONIN QUANT: CPT

## 2025-01-24 PROCEDURE — 83735 ASSAY OF MAGNESIUM: CPT

## 2025-01-24 PROCEDURE — 99285 EMERGENCY DEPT VISIT HI MDM: CPT

## 2025-01-24 PROCEDURE — 71045 X-RAY EXAM CHEST 1 VIEW: CPT

## 2025-01-24 PROCEDURE — 6370000000 HC RX 637 (ALT 250 FOR IP): Performed by: INTERNAL MEDICINE

## 2025-01-24 PROCEDURE — 72125 CT NECK SPINE W/O DYE: CPT

## 2025-01-24 RX ORDER — ENOXAPARIN SODIUM 100 MG/ML
1 INJECTION SUBCUTANEOUS 2 TIMES DAILY
Status: DISCONTINUED | OUTPATIENT
Start: 2025-01-24 | End: 2025-01-24

## 2025-01-24 RX ORDER — DIGOXIN 0.25 MG/ML
250 INJECTION INTRAMUSCULAR; INTRAVENOUS ONCE
Status: COMPLETED | OUTPATIENT
Start: 2025-01-24 | End: 2025-01-24

## 2025-01-24 RX ORDER — SACUBITRIL AND VALSARTAN 49; 51 MG/1; MG/1
1 TABLET, FILM COATED ORAL 2 TIMES DAILY
COMMUNITY

## 2025-01-24 RX ORDER — ACETAMINOPHEN 650 MG/1
650 SUPPOSITORY RECTAL EVERY 6 HOURS PRN
Status: DISCONTINUED | OUTPATIENT
Start: 2025-01-24 | End: 2025-01-27 | Stop reason: HOSPADM

## 2025-01-24 RX ORDER — SODIUM CHLORIDE 0.9 % (FLUSH) 0.9 %
5-40 SYRINGE (ML) INJECTION PRN
Status: DISCONTINUED | OUTPATIENT
Start: 2025-01-24 | End: 2025-01-27 | Stop reason: HOSPADM

## 2025-01-24 RX ORDER — SODIUM CHLORIDE 9 MG/ML
INJECTION, SOLUTION INTRAVENOUS PRN
Status: DISCONTINUED | OUTPATIENT
Start: 2025-01-24 | End: 2025-01-27 | Stop reason: HOSPADM

## 2025-01-24 RX ORDER — FUROSEMIDE 20 MG/1
20 TABLET ORAL WEEKLY
Status: DISCONTINUED | OUTPATIENT
Start: 2025-01-25 | End: 2025-01-27 | Stop reason: HOSPADM

## 2025-01-24 RX ORDER — POLYETHYLENE GLYCOL 3350 17 G/17G
17 POWDER, FOR SOLUTION ORAL DAILY PRN
Status: DISCONTINUED | OUTPATIENT
Start: 2025-01-24 | End: 2025-01-27 | Stop reason: HOSPADM

## 2025-01-24 RX ORDER — ACETAMINOPHEN 325 MG/1
650 TABLET ORAL EVERY 6 HOURS PRN
Status: DISCONTINUED | OUTPATIENT
Start: 2025-01-24 | End: 2025-01-27 | Stop reason: HOSPADM

## 2025-01-24 RX ORDER — RIVASTIGMINE TARTRATE 1.5 MG/1
1.5 CAPSULE ORAL 2 TIMES DAILY
Status: DISCONTINUED | OUTPATIENT
Start: 2025-01-24 | End: 2025-01-27 | Stop reason: HOSPADM

## 2025-01-24 RX ORDER — POTASSIUM CHLORIDE 1500 MG/1
40 TABLET, EXTENDED RELEASE ORAL PRN
Status: DISCONTINUED | OUTPATIENT
Start: 2025-01-24 | End: 2025-01-27 | Stop reason: HOSPADM

## 2025-01-24 RX ORDER — ONDANSETRON 2 MG/ML
4 INJECTION INTRAMUSCULAR; INTRAVENOUS EVERY 6 HOURS PRN
Status: DISCONTINUED | OUTPATIENT
Start: 2025-01-24 | End: 2025-01-27 | Stop reason: HOSPADM

## 2025-01-24 RX ORDER — MAGNESIUM SULFATE IN WATER 40 MG/ML
2000 INJECTION, SOLUTION INTRAVENOUS ONCE
Status: COMPLETED | OUTPATIENT
Start: 2025-01-24 | End: 2025-01-24

## 2025-01-24 RX ORDER — GABAPENTIN 300 MG/1
300 CAPSULE ORAL SEE ADMIN INSTRUCTIONS
Status: DISCONTINUED | OUTPATIENT
Start: 2025-01-24 | End: 2025-01-24 | Stop reason: DRUGHIGH

## 2025-01-24 RX ORDER — ONDANSETRON 4 MG/1
4 TABLET, ORALLY DISINTEGRATING ORAL EVERY 8 HOURS PRN
Status: DISCONTINUED | OUTPATIENT
Start: 2025-01-24 | End: 2025-01-27 | Stop reason: HOSPADM

## 2025-01-24 RX ORDER — SODIUM CHLORIDE 0.9 % (FLUSH) 0.9 %
5-40 SYRINGE (ML) INJECTION EVERY 12 HOURS SCHEDULED
Status: DISCONTINUED | OUTPATIENT
Start: 2025-01-24 | End: 2025-01-27 | Stop reason: HOSPADM

## 2025-01-24 RX ORDER — GABAPENTIN 300 MG/1
300 CAPSULE ORAL 2 TIMES DAILY
Status: DISCONTINUED | OUTPATIENT
Start: 2025-01-24 | End: 2025-01-27 | Stop reason: HOSPADM

## 2025-01-24 RX ORDER — MAGNESIUM SULFATE IN WATER 40 MG/ML
2000 INJECTION, SOLUTION INTRAVENOUS PRN
Status: DISCONTINUED | OUTPATIENT
Start: 2025-01-24 | End: 2025-01-27 | Stop reason: HOSPADM

## 2025-01-24 RX ORDER — SPIRONOLACTONE 25 MG/1
12.5 TABLET ORAL DAILY
Status: DISCONTINUED | OUTPATIENT
Start: 2025-01-24 | End: 2025-01-27 | Stop reason: HOSPADM

## 2025-01-24 RX ORDER — POTASSIUM CHLORIDE 7.45 MG/ML
10 INJECTION INTRAVENOUS PRN
Status: DISCONTINUED | OUTPATIENT
Start: 2025-01-24 | End: 2025-01-27 | Stop reason: HOSPADM

## 2025-01-24 RX ORDER — OSELTAMIVIR PHOSPHATE 30 MG/1
30 CAPSULE ORAL 2 TIMES DAILY
Status: DISCONTINUED | OUTPATIENT
Start: 2025-01-24 | End: 2025-01-27 | Stop reason: HOSPADM

## 2025-01-24 RX ORDER — BENZONATATE 100 MG/1
100 CAPSULE ORAL 3 TIMES DAILY PRN
Status: DISCONTINUED | OUTPATIENT
Start: 2025-01-24 | End: 2025-01-27 | Stop reason: HOSPADM

## 2025-01-24 RX ORDER — 0.9 % SODIUM CHLORIDE 0.9 %
500 INTRAVENOUS SOLUTION INTRAVENOUS ONCE
Status: COMPLETED | OUTPATIENT
Start: 2025-01-24 | End: 2025-01-24

## 2025-01-24 RX ORDER — CHLORHEXIDINE GLUCONATE 4 %
1 LIQUID (ML) TOPICAL EVERY MORNING
COMMUNITY

## 2025-01-24 RX ORDER — OSELTAMIVIR PHOSPHATE 75 MG/1
75 CAPSULE ORAL ONCE
Status: COMPLETED | OUTPATIENT
Start: 2025-01-24 | End: 2025-01-24

## 2025-01-24 RX ORDER — 0.9 % SODIUM CHLORIDE 0.9 %
250 INTRAVENOUS SOLUTION INTRAVENOUS ONCE
Status: COMPLETED | OUTPATIENT
Start: 2025-01-24 | End: 2025-01-24

## 2025-01-24 RX ORDER — METOPROLOL SUCCINATE 25 MG/1
25 TABLET, EXTENDED RELEASE ORAL 2 TIMES DAILY
Status: DISCONTINUED | OUTPATIENT
Start: 2025-01-24 | End: 2025-01-27 | Stop reason: HOSPADM

## 2025-01-24 RX ORDER — M-VIT,TX,IRON,MINS/CALC/FOLIC 27MG-0.4MG
1 TABLET ORAL DAILY
Status: DISCONTINUED | OUTPATIENT
Start: 2025-01-24 | End: 2025-01-27 | Stop reason: HOSPADM

## 2025-01-24 RX ORDER — METOPROLOL SUCCINATE 25 MG/1
25 TABLET, EXTENDED RELEASE ORAL DAILY
Status: DISCONTINUED | OUTPATIENT
Start: 2025-01-24 | End: 2025-01-24

## 2025-01-24 RX ORDER — OSELTAMIVIR PHOSPHATE 75 MG/1
75 CAPSULE ORAL 2 TIMES DAILY
Status: DISCONTINUED | OUTPATIENT
Start: 2025-01-24 | End: 2025-01-24 | Stop reason: DRUGHIGH

## 2025-01-24 RX ORDER — VITAMIN B COMPLEX
1000 TABLET ORAL DAILY
Status: DISCONTINUED | OUTPATIENT
Start: 2025-01-24 | End: 2025-01-25

## 2025-01-24 RX ADMIN — APIXABAN 5 MG: 5 TABLET, FILM COATED ORAL at 21:33

## 2025-01-24 RX ADMIN — DIGOXIN 250 MCG: 250 INJECTION, SOLUTION INTRAMUSCULAR; INTRAVENOUS; PARENTERAL at 13:52

## 2025-01-24 RX ADMIN — MAGNESIUM SULFATE HEPTAHYDRATE 2000 MG: 40 INJECTION, SOLUTION INTRAVENOUS at 07:04

## 2025-01-24 RX ADMIN — SODIUM CHLORIDE 250 ML: 9 INJECTION, SOLUTION INTRAVENOUS at 05:42

## 2025-01-24 RX ADMIN — OSELTAMIVIR PHOSPHATE 75 MG: 75 CAPSULE ORAL at 07:01

## 2025-01-24 RX ADMIN — SODIUM CHLORIDE, PRESERVATIVE FREE 10 ML: 5 INJECTION INTRAVENOUS at 21:33

## 2025-01-24 RX ADMIN — Medication 1000 UNITS: at 13:55

## 2025-01-24 RX ADMIN — SODIUM CHLORIDE, PRESERVATIVE FREE 10 ML: 5 INJECTION INTRAVENOUS at 17:08

## 2025-01-24 RX ADMIN — METOPROLOL SUCCINATE 25 MG: 25 TABLET, FILM COATED, EXTENDED RELEASE ORAL at 21:32

## 2025-01-24 RX ADMIN — GABAPENTIN 300 MG: 300 CAPSULE ORAL at 21:32

## 2025-01-24 RX ADMIN — RIVASTIGMINE TARTRATE 1.5 MG: 1.5 CAPSULE ORAL at 21:32

## 2025-01-24 RX ADMIN — SODIUM CHLORIDE 500 ML: 9 INJECTION, SOLUTION INTRAVENOUS at 07:28

## 2025-01-24 RX ADMIN — GABAPENTIN 300 MG: 300 CAPSULE ORAL at 13:54

## 2025-01-24 RX ADMIN — OSELTAMIVIR PHOSPHATE 30 MG: 30 CAPSULE ORAL at 21:32

## 2025-01-24 RX ADMIN — DIGOXIN 250 MCG: 0.25 INJECTION INTRAMUSCULAR; INTRAVENOUS at 07:01

## 2025-01-24 RX ADMIN — RIVASTIGMINE TARTRATE 1.5 MG: 1.5 CAPSULE ORAL at 13:54

## 2025-01-24 ASSESSMENT — PAIN DESCRIPTION - LOCATION
LOCATION: SACRUM
LOCATION: BUTTOCKS

## 2025-01-24 ASSESSMENT — PAIN SCALES - GENERAL: PAINLEVEL_OUTOF10: 1

## 2025-01-24 ASSESSMENT — PAIN DESCRIPTION - ORIENTATION: ORIENTATION: MID

## 2025-01-24 ASSESSMENT — LIFESTYLE VARIABLES
HOW MANY STANDARD DRINKS CONTAINING ALCOHOL DO YOU HAVE ON A TYPICAL DAY: PATIENT DOES NOT DRINK
HOW OFTEN DO YOU HAVE A DRINK CONTAINING ALCOHOL: NEVER

## 2025-01-24 ASSESSMENT — PAIN - FUNCTIONAL ASSESSMENT: PAIN_FUNCTIONAL_ASSESSMENT: 0-10

## 2025-01-24 ASSESSMENT — PAIN DESCRIPTION - DESCRIPTORS: DESCRIPTORS: DISCOMFORT

## 2025-01-24 NOTE — ED PROVIDER NOTES
nursing note reviewed.   Constitutional:       General: She is not in acute distress.     Appearance: Normal appearance. She is well-developed. She is not ill-appearing.   HENT:      Head: Normocephalic and atraumatic.      Right Ear: External ear normal.      Left Ear: External ear normal.   Eyes:      General:         Right eye: No discharge.         Left eye: No discharge.      Extraocular Movements: Extraocular movements intact.      Conjunctiva/sclera: Conjunctivae normal.   Cardiovascular:      Rate and Rhythm: Normal rate and regular rhythm.      Heart sounds: Normal heart sounds. No murmur heard.  Pulmonary:      Effort: Pulmonary effort is normal. No respiratory distress.      Breath sounds: Normal breath sounds. No stridor.   Abdominal:      General: There is no distension.      Palpations: Abdomen is soft. There is no mass.   Musculoskeletal:         General: No swelling or tenderness.      Cervical back: Normal range of motion and neck supple.   Skin:     General: Skin is warm and dry.      Coloration: Skin is not jaundiced or pale.   Neurological:      Mental Status: She is alert. Mental status is at baseline. She is disoriented.      Cranial Nerves: No cranial nerve deficit.      Coordination: Coordination normal.        Procedures    [facility]       ED Course as of 02/01/25 1447   Fri Jan 24, 2025   0530 EKG:  This EKG is signed and interpreted by me.    Rate: 130  Rhythm: Atrial fibrillation  Interpretation: Atrial fibrillation RVR, right bundle branch block, left anterior fascicular block, no visible ST elevations depressions, T wave inversions, lead I, aVL, V2 new compared to prior  Comparison: Changes compared to prior she is now new onset atrial fibrillation [BB]   0604 Spoke with on call Cardioloy,  [BB]   0636 Influenza A by PCR(!): DETECTED [MERARY]   0636 NT Pro-BNP(!): 1,212 [MERARY]   0636 Troponin, High Sensitivity(!): 27 [MERARY]   0636 WBC: 4.8 [MERARY]   0636 Hemoglobin Quant: 13.0 [MERARY]   0636 Anion Gap: 12

## 2025-01-24 NOTE — PROGRESS NOTES
This patient is on medication that requires renal, weight, and/or indication dose adjustment.      Date Body Weight IBW  Adjusted BW SCr  CrCl Dialysis status   1/24/2025 80.7 kg (178 lb) Ideal body weight: 63.9 kg (140 lb 14 oz)  Adjusted ideal body weight: 70.6 kg (155 lb 11.6 oz) Serum creatinine: 1.6 mg/dL (H) 01/24/25 0540  Estimated creatinine clearance: 38 mL/min (A) N/a       Pharmacy has dose-adjusted the following medication(s):    Date Previous Order Adjusted Order   1/24/2025 Tamiflu 75 mg bid Tamiflu 75 mgx1 dose ,then   30 mg bid       These changes were made per protocol according to the [facility]   Automatic Renal Dose Adjustment Policy.     *Please note this dose may need readjusted if patient's condition changes.    Please contact pharmacy with any questions regarding these changes.    Merced Hastings RPH  1/24/2025  10:01 AM

## 2025-01-24 NOTE — PROGRESS NOTES
Database initiated. Patient is alert to self. She comes in from home with . States she uses no assistive devices and is RA at baseline. She is here for a fall.

## 2025-01-24 NOTE — PROGRESS NOTES
4 Eyes Skin Assessment     NAME:  Karlie Vaca  YOB: 1955  MEDICAL RECORD NUMBER:  [id number]    The patient is being assessed for  Admission    I agree that at least one RN has performed a thorough Head to Toe Skin Assessment on the patient. ALL assessment sites listed below have been assessed.      Areas assessed by both nurses:    Head, Face, Ears, Shoulders, Back, Chest, Arms, Elbows, Hands, Sacrum. Buttock, Coccyx, Ischium, Legs. Feet and Heels, and Under Medical Devices         Does the Patient have a Wound? No noted wound(s)       Saad Prevention initiated by RN: No  Wound Care Orders initiated by RN: No    Pressure Injury (Stage 3,4, Unstageable, DTI, NWPT, and Complex wounds) if present, place Wound referral order by RN under : No    New Ostomies, if present place, Ostomy referral order under : No     Nurse 1 eSignature: Electronically signed by Anne Arvizu RN on 1/24/25 at 5:27 PM EST    **SHARE this note so that the co-signing nurse can place an eSignature**    Nurse 2 eSignature: {Esignature:[id number]}

## 2025-01-24 NOTE — ED NOTES
..ED to Inpatient Handoff Report    Notified brenna that electronic handoff available and patient ready for transport to room 543.    Safety Risks: Risk of falls    Patient in Restraints: no    Constant Observer or Patient : no    Telemetry Monitoring Ordered :Yes      Cardiac Rhythm: A fib RVR    Order to transfer to unit without monitor:YES    Last MEWS: 1 Time completed: 1606    Deterioration Index Score:   Predictive Model Details          21 (Normal)  Factor Value    Calculated 1/24/2025 16:28 61% Age 69 years old    Deterioration Index Model 22% Cardiac rhythm A fib RVR     5% Systolic 129     4% Sodium 135 mmol/L     4% Respiratory rate 17     3% Potassium 3.6 mmol/L     1% WBC count 4.8 k/uL     1% Pulse oximetry 97 %     0% Hematocrit 40.1 %     0% Temperature 98.1 °F (36.7 °C)     0% Pulse 73        Vitals:    01/24/25 1439 01/24/25 1454 01/24/25 1509 01/24/25 1606   BP:  125/76  129/61   Pulse: (!) 104 (!) 112 76 73   Resp: 17 17 21 17   Temp:    98.1 °F (36.7 °C)   TempSrc:       SpO2: 97% 99% 97% 97%   Weight:       Height:             Opportunity for questions and clarification was provided.     
Assumed care of patient. Introduced self to patient as RN. Patient denies any complaints. Awaiting inpatient assignment. Call light within reach  
Lunch tray ordered s/b here by 12:30 according to nutrition  
spine. Mild-to-moderate   multilevel degenerative changes of the lumbar spine.         XR CHEST PORTABLE   Final Result   No acute cardiopulmonary disease.           Results for orders placed or performed during the hospital encounter of 01/24/25   COVID-19, Rapid    Specimen: Nasopharyngeal Swab   Result Value Ref Range    Specimen Description .NASOPHARYNGEAL SWAB     SARS-CoV-2, Rapid Not Detected Not Detected   Influenza A+B, PCR    Specimen: Nasal   Result Value Ref Range    Influenza A by PCR DETECTED (A) Not Detected    Influenza B by PCR Not Detected Not Detected   RSV Detection    Specimen: Nasopharyngeal Swab   Result Value Ref Range    Source .NASOPHARYNGEAL SWAB     RSV by PCR Not Detected Not Detected   CBC with Auto Differential   Result Value Ref Range    WBC 4.8 4.5 - 11.5 k/uL    RBC 4.25 3.50 - 5.50 m/uL    Hemoglobin 13.0 11.5 - 15.5 g/dL    Hematocrit 40.1 34.0 - 48.0 %    MCV 94.4 80.0 - 99.9 fL    MCH 30.6 26.0 - 35.0 pg    MCHC 32.4 32.0 - 34.5 g/dL    RDW 13.0 11.5 - 15.0 %    Platelets 255 130 - 450 k/uL    MPV 8.4 7.0 - 12.0 fL    Neutrophils % 74 43.0 - 80.0 %    Lymphocytes % 11 (L) 20.0 - 42.0 %    Monocytes % 14 (H) 2.0 - 12.0 %    Eosinophils % 1 0 - 6 %    Basophils % 0 0.0 - 2.0 %    Immature Granulocytes % 0 0.0 - 5.0 %    Neutrophils Absolute 3.54 1.80 - 7.30 k/uL    Lymphocytes Absolute 0.53 (L) 1.50 - 4.00 k/uL    Monocytes Absolute 0.65 0.10 - 0.95 k/uL    Eosinophils Absolute 0.05 0.05 - 0.50 k/uL    Basophils Absolute 0.02 0.00 - 0.20 k/uL    Immature Granulocytes Absolute <0.03 0.00 - 0.58 k/uL    RBC Morphology Normal    CMP   Result Value Ref Range    Sodium 135 132 - 146 mmol/L    Potassium 3.6 3.5 - 5.0 mmol/L    Chloride 98 98 - 107 mmol/L    CO2 25 22 - 29 mmol/L    Anion Gap 12 7 - 16 mmol/L    Glucose 102 (H) 74 - 99 mg/dL    BUN 18 6 - 23 mg/dL    Creatinine 1.6 (H) 0.50 - 1.00 mg/dL    Est, Glom Filt Rate 35 (L) >60 mL/min/1.73m2    Calcium 8.9 8.6 - 10.2 mg/dL

## 2025-01-24 NOTE — PLAN OF CARE
Problem: ABCDS Injury Assessment  Goal: Absence of physical injury  Outcome: Progressing     Problem: Pain  Goal: Verbalizes/displays adequate comfort level or baseline comfort level  Outcome: Progressing     Problem: Chronic Conditions and Co-morbidities  Goal: Patient's chronic conditions and co-morbidity symptoms are monitored and maintained or improved  Outcome: Progressing

## 2025-01-24 NOTE — H&P
[facility] Hospitalist Group History and Physical      CHIEF COMPLAINT:  afib rvr, fall, AMS     History of Present Illness:      This is a 69 year old female with past medical history of nonischemic cardiomyopathy, CHF, AML, CKD, dementia who presents from home with complaints of fall, AMS, congestion. She is tachycardic and hypotensive on arrival. Lab workup: Cr 1.6, BNP 1212, Troponin 27 > 32, influenza A positive, imaging negative. She was given digoxin, 750 mL bolus, magnesium in ER with decision to admit. Cardiology consulted.     Informant(s) for H&P: patient, epic,      REVIEW OF SYSTEMS:  A comprehensive review of systems was negative except for: what is in the HPI    PMH:  Past Medical History:   Diagnosis Date    Acute leukemia (HCC)     Breast cancer (HCC)     right    Cancer (HCC) 2004    right breast    History of blood transfusion     Hypertension      Surgical History:  Past Surgical History:   Procedure Laterality Date    ANKLE SURGERY Left     ORIF    BREAST LUMPECTOMY      right side 2004.also had chemo & radiation    BREAST LUMPECTOMY Right     CHOLECYSTECTOMY, LAPAROSCOPIC N/A 12/6/2018    CHOLECYSTECTOMY LAPAROSCOPIC performed by Ravi Bennett [place] [facility]    DILATION AND CURETTAGE OF UTERUS      OTHER SURGICAL HISTORY Left 11/29/2016    left hand middle finger ganglion removal    TUBAL LIGATION       Medications Prior to Admission:    Prior to Admission medications    Medication Sig Start Date End Date Taking? Authorizing Provider   sacubitril-valsartan (ENTRESTO) 24-26 MG per tablet Take 1 tablet by mouth 2 times daily  Patient taking differently: Take 2 tablets by mouth 2 times daily Pt dose was increased on tameka 3 to 2 tabs BID. 5/22/20  Yes Naya Michele,    rivastigmine (EXELON) 1.5 MG capsule Take 1 capsule by mouth 2 times daily    ProviderRachel MD   spironolactone (ALDACTONE) 25 MG tablet Take 0.5 tablets by mouth daily    ProviderRachel MD   furosemide

## 2025-01-25 ENCOUNTER — APPOINTMENT (OUTPATIENT)
Dept: GENERAL RADIOLOGY | Age: 70
DRG: 872 | End: 2025-01-25
Payer: MEDICARE

## 2025-01-25 ENCOUNTER — APPOINTMENT (OUTPATIENT)
Age: 70
DRG: 872 | End: 2025-01-25
Attending: INTERNAL MEDICINE
Payer: MEDICARE

## 2025-01-25 PROBLEM — E86.0 DEHYDRATION: Status: ACTIVE | Noted: 2025-01-25

## 2025-01-25 PROBLEM — I48.91 ATRIAL FIBRILLATION (HCC): Status: ACTIVE | Noted: 2025-01-25

## 2025-01-25 PROBLEM — J10.1 INFLUENZA A: Status: ACTIVE | Noted: 2025-01-25

## 2025-01-25 PROBLEM — I50.22 CHRONIC SYSTOLIC (CONGESTIVE) HEART FAILURE (HCC): Status: ACTIVE | Noted: 2025-01-25

## 2025-01-25 LAB
ANION GAP SERPL CALCULATED.3IONS-SCNC: 10 MMOL/L (ref 7–16)
BUN SERPL-MCNC: 21 MG/DL (ref 6–23)
CALCIUM SERPL-MCNC: 8.4 MG/DL (ref 8.6–10.2)
CHLORIDE SERPL-SCNC: 103 MMOL/L (ref 98–107)
CO2 SERPL-SCNC: 22 MMOL/L (ref 22–29)
CREAT SERPL-MCNC: 1.3 MG/DL (ref 0.5–1)
ECHO AO ASC DIAM: 3.1 CM
ECHO AO ASCENDING AORTA INDEX: 1.61 CM/M2
ECHO AO SINUS VALSALVA DIAM: 2.6 CM
ECHO AO SINUS VALSALVA INDEX: 1.35 CM/M2
ECHO AV AREA PEAK VELOCITY: 1.6 CM2
ECHO AV AREA VTI: 1.8 CM2
ECHO AV AREA/BSA PEAK VELOCITY: 0.8 CM2/M2
ECHO AV AREA/BSA VTI: 0.9 CM2/M2
ECHO AV CUSP MM: 1.7 CM
ECHO AV MEAN GRADIENT: 5 MMHG
ECHO AV MEAN VELOCITY: 1.1 M/S
ECHO AV PEAK GRADIENT: 8 MMHG
ECHO AV PEAK VELOCITY: 1.5 M/S
ECHO AV VELOCITY RATIO: 0.53
ECHO AV VTI: 28.6 CM
ECHO BSA: 1.93 M2
ECHO EST RA PRESSURE: 3 MMHG
ECHO LA DIAMETER INDEX: 1.88 CM/M2
ECHO LA DIAMETER: 3.6 CM
ECHO LA VOL A-L A2C: 43 ML (ref 22–52)
ECHO LA VOL A-L A4C: 50 ML (ref 22–52)
ECHO LA VOL MOD A2C: 42 ML (ref 22–52)
ECHO LA VOL MOD A4C: 44 ML (ref 22–52)
ECHO LA VOLUME AREA LENGTH: 50 ML
ECHO LA VOLUME INDEX A-L A2C: 22 ML/M2 (ref 16–34)
ECHO LA VOLUME INDEX A-L A4C: 26 ML/M2 (ref 16–34)
ECHO LA VOLUME INDEX AREA LENGTH: 26 ML/M2 (ref 16–34)
ECHO LA VOLUME INDEX MOD A2C: 22 ML/M2 (ref 16–34)
ECHO LA VOLUME INDEX MOD A4C: 23 ML/M2 (ref 16–34)
ECHO LV EDV A2C: 75 ML
ECHO LV EDV A4C: 57 ML
ECHO LV EDV BP: 67 ML (ref 56–104)
ECHO LV EDV INDEX A4C: 30 ML/M2
ECHO LV EDV INDEX BP: 35 ML/M2
ECHO LV EDV NDEX A2C: 39 ML/M2
ECHO LV EJECTION FRACTION A2C: 46 %
ECHO LV EJECTION FRACTION A4C: 35 %
ECHO LV EJECTION FRACTION BIPLANE: 42 % (ref 55–100)
ECHO LV ESV A2C: 40 ML
ECHO LV ESV A4C: 37 ML
ECHO LV ESV BP: 39 ML (ref 19–49)
ECHO LV ESV INDEX A2C: 21 ML/M2
ECHO LV ESV INDEX A4C: 19 ML/M2
ECHO LV ESV INDEX BP: 20 ML/M2
ECHO LV FRACTIONAL SHORTENING: 22 % (ref 28–44)
ECHO LV INTERNAL DIMENSION DIASTOLE INDEX: 2.4 CM/M2
ECHO LV INTERNAL DIMENSION DIASTOLIC: 4.6 CM (ref 3.9–5.3)
ECHO LV INTERNAL DIMENSION SYSTOLIC INDEX: 1.88 CM/M2
ECHO LV INTERNAL DIMENSION SYSTOLIC: 3.6 CM
ECHO LV ISOVOLUMETRIC RELAXATION TIME (IVRT): 168.4 MS
ECHO LV IVSD: 1.2 CM (ref 0.6–0.9)
ECHO LV IVSS: 1.2 CM
ECHO LV MASS 2D: 217.4 G (ref 67–162)
ECHO LV MASS INDEX 2D: 113.2 G/M2 (ref 43–95)
ECHO LV POSTERIOR WALL DIASTOLIC: 1.3 CM (ref 0.6–0.9)
ECHO LV POSTERIOR WALL SYSTOLIC: 1.4 CM
ECHO LV RELATIVE WALL THICKNESS RATIO: 0.57
ECHO LVOT AREA: 3.1 CM2
ECHO LVOT AV VTI INDEX: 0.57
ECHO LVOT DIAM: 2 CM
ECHO LVOT MEAN GRADIENT: 2 MMHG
ECHO LVOT PEAK GRADIENT: 2 MMHG
ECHO LVOT PEAK VELOCITY: 0.8 M/S
ECHO LVOT STROKE VOLUME INDEX: 26.8 ML/M2
ECHO LVOT SV: 51.5 ML
ECHO LVOT VTI: 16.4 CM
ECHO MV "A" WAVE DURATION: 126.9 MSEC
ECHO MV A VELOCITY: 0.69 M/S
ECHO MV AREA PHT: 1.6 CM2
ECHO MV AREA VTI: 2 CM2
ECHO MV E DECELERATION TIME (DT): 427.3 MS
ECHO MV E VELOCITY: 0.45 M/S
ECHO MV E/A RATIO: 0.65
ECHO MV LVOT VTI INDEX: 1.55
ECHO MV MAX VELOCITY: 0.9 M/S
ECHO MV MEAN GRADIENT: 1 MMHG
ECHO MV MEAN VELOCITY: 0.5 M/S
ECHO MV PEAK GRADIENT: 3 MMHG
ECHO MV PRESSURE HALF TIME (PHT): 134.1 MS
ECHO MV VTI: 25.4 CM
ECHO PULMONARY ARTERY END DIASTOLIC PRESSURE: 0 MMHG
ECHO PV REGURGITANT MAX VELOCITY: 0.3 M/S
ECHO PVEIN A DURATION: 136.1 MS
ECHO PVEIN A VELOCITY: 0.2 M/S
ECHO PVEIN PEAK D VELOCITY: 0.3 M/S
ECHO PVEIN PEAK S VELOCITY: 0.6 M/S
ECHO PVEIN S/D RATIO: 2
ECHO RIGHT VENTRICULAR SYSTOLIC PRESSURE (RVSP): 23 MMHG
ECHO RV INTERNAL DIMENSION: 2.6 CM
ECHO RV TAPSE: 2.4 CM (ref 1.7–?)
ECHO TV REGURGITANT MAX VELOCITY: 2.22 M/S
ECHO TV REGURGITANT PEAK GRADIENT: 20 MMHG
EKG ATRIAL RATE: 141 BPM
EKG Q-T INTERVAL: 312 MS
EKG QRS DURATION: 136 MS
EKG QTC CALCULATION (BAZETT): 459 MS
EKG R AXIS: -59 DEGREES
EKG T AXIS: 97 DEGREES
EKG VENTRICULAR RATE: 130 BPM
ERYTHROCYTE [DISTWIDTH] IN BLOOD BY AUTOMATED COUNT: 13.1 % (ref 11.5–15)
GFR, ESTIMATED: 44 ML/MIN/1.73M2
GLUCOSE SERPL-MCNC: 91 MG/DL (ref 74–99)
HCT VFR BLD AUTO: 34.5 % (ref 34–48)
HGB BLD-MCNC: 11.3 G/DL (ref 11.5–15.5)
INR PPP: 1.2
MCH RBC QN AUTO: 30.5 PG (ref 26–35)
MCHC RBC AUTO-ENTMCNC: 32.8 G/DL (ref 32–34.5)
MCV RBC AUTO: 93 FL (ref 80–99.9)
PLATELET # BLD AUTO: 222 K/UL (ref 130–450)
PMV BLD AUTO: 8.7 FL (ref 7–12)
POTASSIUM SERPL-SCNC: 3.8 MMOL/L (ref 3.5–5)
PROTHROMBIN TIME: 13.1 SEC (ref 9.3–12.4)
RBC # BLD AUTO: 3.71 M/UL (ref 3.5–5.5)
SODIUM SERPL-SCNC: 135 MMOL/L (ref 132–146)
WBC OTHER # BLD: 3.4 K/UL (ref 4.5–11.5)

## 2025-01-25 PROCEDURE — 93306 TTE W/DOPPLER COMPLETE: CPT | Performed by: INTERNAL MEDICINE

## 2025-01-25 PROCEDURE — 80048 BASIC METABOLIC PNL TOTAL CA: CPT

## 2025-01-25 PROCEDURE — 93306 TTE W/DOPPLER COMPLETE: CPT

## 2025-01-25 PROCEDURE — 99233 SBSQ HOSP IP/OBS HIGH 50: CPT | Performed by: INTERNAL MEDICINE

## 2025-01-25 PROCEDURE — 6370000000 HC RX 637 (ALT 250 FOR IP): Performed by: INTERNAL MEDICINE

## 2025-01-25 PROCEDURE — 85610 PROTHROMBIN TIME: CPT

## 2025-01-25 PROCEDURE — 1200000000 HC SEMI PRIVATE

## 2025-01-25 PROCEDURE — 73560 X-RAY EXAM OF KNEE 1 OR 2: CPT

## 2025-01-25 PROCEDURE — 93005 ELECTROCARDIOGRAM TRACING: CPT | Performed by: STUDENT IN AN ORGANIZED HEALTH CARE EDUCATION/TRAINING PROGRAM

## 2025-01-25 PROCEDURE — 73610 X-RAY EXAM OF ANKLE: CPT

## 2025-01-25 PROCEDURE — 2500000003 HC RX 250 WO HCPCS: Performed by: STUDENT IN AN ORGANIZED HEALTH CARE EDUCATION/TRAINING PROGRAM

## 2025-01-25 PROCEDURE — 6370000000 HC RX 637 (ALT 250 FOR IP): Performed by: STUDENT IN AN ORGANIZED HEALTH CARE EDUCATION/TRAINING PROGRAM

## 2025-01-25 PROCEDURE — 85027 COMPLETE CBC AUTOMATED: CPT

## 2025-01-25 PROCEDURE — 2580000003 HC RX 258: Performed by: INTERNAL MEDICINE

## 2025-01-25 PROCEDURE — 73521 X-RAY EXAM HIPS BI 2 VIEWS: CPT

## 2025-01-25 PROCEDURE — 99232 SBSQ HOSP IP/OBS MODERATE 35: CPT | Performed by: INTERNAL MEDICINE

## 2025-01-25 PROCEDURE — 93010 ELECTROCARDIOGRAM REPORT: CPT | Performed by: INTERNAL MEDICINE

## 2025-01-25 RX ORDER — VITAMIN B COMPLEX
1000 TABLET ORAL NIGHTLY
Status: DISCONTINUED | OUTPATIENT
Start: 2025-01-25 | End: 2025-01-27 | Stop reason: HOSPADM

## 2025-01-25 RX ORDER — SODIUM CHLORIDE 9 MG/ML
INJECTION, SOLUTION INTRAVENOUS CONTINUOUS
Status: DISCONTINUED | OUTPATIENT
Start: 2025-01-25 | End: 2025-01-27 | Stop reason: HOSPADM

## 2025-01-25 RX ADMIN — SODIUM CHLORIDE, PRESERVATIVE FREE 10 ML: 5 INJECTION INTRAVENOUS at 21:24

## 2025-01-25 RX ADMIN — Medication 1 TABLET: at 08:47

## 2025-01-25 RX ADMIN — SODIUM CHLORIDE: 9 INJECTION, SOLUTION INTRAVENOUS at 13:50

## 2025-01-25 RX ADMIN — OSELTAMIVIR PHOSPHATE 30 MG: 30 CAPSULE ORAL at 21:24

## 2025-01-25 RX ADMIN — GABAPENTIN 300 MG: 300 CAPSULE ORAL at 08:47

## 2025-01-25 RX ADMIN — OSELTAMIVIR PHOSPHATE 30 MG: 30 CAPSULE ORAL at 08:46

## 2025-01-25 RX ADMIN — Medication 1000 UNITS: at 21:24

## 2025-01-25 RX ADMIN — METOPROLOL SUCCINATE 25 MG: 25 TABLET, FILM COATED, EXTENDED RELEASE ORAL at 08:47

## 2025-01-25 RX ADMIN — BENZONATATE 100 MG: 100 CAPSULE ORAL at 21:30

## 2025-01-25 RX ADMIN — SODIUM CHLORIDE, PRESERVATIVE FREE 10 ML: 5 INJECTION INTRAVENOUS at 08:50

## 2025-01-25 RX ADMIN — APIXABAN 5 MG: 5 TABLET, FILM COATED ORAL at 08:47

## 2025-01-25 RX ADMIN — RIVASTIGMINE TARTRATE 1.5 MG: 1.5 CAPSULE ORAL at 21:25

## 2025-01-25 RX ADMIN — GABAPENTIN 300 MG: 300 CAPSULE ORAL at 21:24

## 2025-01-25 RX ADMIN — APIXABAN 5 MG: 5 TABLET, FILM COATED ORAL at 21:24

## 2025-01-25 RX ADMIN — METOPROLOL SUCCINATE 25 MG: 25 TABLET, FILM COATED, EXTENDED RELEASE ORAL at 21:24

## 2025-01-25 RX ADMIN — RIVASTIGMINE TARTRATE 1.5 MG: 1.5 CAPSULE ORAL at 08:47

## 2025-01-25 ASSESSMENT — PAIN SCALES - GENERAL
PAINLEVEL_OUTOF10: 0
PAINLEVEL_OUTOF10: 0

## 2025-01-25 NOTE — PLAN OF CARE
Problem: ABCDS Injury Assessment  Goal: Absence of physical injury  1/25/2025 0325 by Dorothea Ferrell RN  Outcome: Progressing  1/24/2025 1719 by Anne Arvizu RN  Outcome: Progressing     Problem: Discharge Planning  Goal: Discharge to home or other facility with appropriate resources  1/25/2025 0325 by Dorothea Ferrell RN  Outcome: Progressing  1/24/2025 1719 by Anne Arvizu RN  Outcome: Progressing     Problem: Chronic Conditions and Co-morbidities  Goal: Patient's chronic conditions and co-morbidity symptoms are monitored and maintained or improved  1/25/2025 0325 by Dorothea Ferrell RN  Outcome: Progressing  1/24/2025 1719 by Anne Arvizu RN  Outcome: Progressing     Problem: Pain  Goal: Verbalizes/displays adequate comfort level or baseline comfort level  1/25/2025 0325 by Dorothea Ferrell RN  Outcome: Progressing  1/24/2025 1719 by Anne Arvizu RN  Outcome: Progressing     Problem: Safety - Adult  Goal: Free from fall injury  Outcome: Progressing

## 2025-01-25 NOTE — PROGRESS NOTES
[facility] Hospitalist   Progress Note    Admitting Date and Time: 1/24/2025  4:38 AM  Admit Dx: A-fib (HCC) [I48.91]  Influenza A [J10.1]  Atrial fibrillation with rapid ventricular response (HCC) [I48.91]  Fall, initial encounter [W19.XXXA]  Atrial fibrillation, unspecified type (HCC) [I48.91]  Acute bilateral low back pain without sciatica [M54.50]    Subjective:    Patient was admitted with A-fib (HCC) [I48.91]  Influenza A [J10.1]  Atrial fibrillation with rapid ventricular response (HCC) [I48.91]  Fall, initial encounter [W19.XXXA]  Atrial fibrillation, unspecified type (HCC) [I48.91]  Acute bilateral low back pain without sciatica [M54.50]. Patient denies fever, chills, cp, sob, n/v.     Vitamin D  1,000 Units Oral Nightly    sodium chloride flush  5-40 mL IntraVENous 2 times per day    [Held by provider] furosemide  20 mg Oral Weekly    rivastigmine  1.5 mg Oral BID    [Held by provider] sacubitril-valsartan  1 tablet Oral BID    [Held by provider] spironolactone  12.5 mg Oral Daily    therapeutic multivitamin-minerals  1 tablet Oral Daily    oseltamivir  30 mg Oral BID    gabapentin  300 mg Oral BID    metoprolol succinate  25 mg Oral BID    apixaban  5 mg Oral BID     sodium chloride flush, 5-40 mL, PRN  sodium chloride, , PRN  potassium chloride, 40 mEq, PRN   Or  potassium alternative oral replacement, 40 mEq, PRN   Or  potassium chloride, 10 mEq, PRN  magnesium sulfate, 2,000 mg, PRN  ondansetron, 4 mg, Q8H PRN   Or  ondansetron, 4 mg, Q6H PRN  polyethylene glycol, 17 g, Daily PRN  acetaminophen, 650 mg, Q6H PRN   Or  acetaminophen, 650 mg, Q6H PRN  benzonatate, 100 mg, TID PRN         Objective:    /64   Pulse 80   Temp 97.5 °F (36.4 °C) (Oral)   Resp 18   Ht 1.727 m (5' 8\")   Wt 78 kg (172 lb)   SpO2 98%   BMI 26.15 kg/m²   Skin: warm and dry, no rash or erythema  Pulmonary/Chest: clear to auscultation bilaterally- no wheezes, rales or rhonchi, normal air movement, no

## 2025-01-26 LAB
ANION GAP SERPL CALCULATED.3IONS-SCNC: 11 MMOL/L (ref 7–16)
BACTERIA URNS QL MICRO: ABNORMAL
BASOPHILS # BLD: 0.02 K/UL (ref 0–0.2)
BASOPHILS NFR BLD: 1 % (ref 0–2)
BILIRUB UR QL STRIP: NEGATIVE
BUN SERPL-MCNC: 20 MG/DL (ref 6–23)
CALCIUM SERPL-MCNC: 8.9 MG/DL (ref 8.6–10.2)
CHLORIDE SERPL-SCNC: 102 MMOL/L (ref 98–107)
CLARITY UR: CLEAR
CO2 SERPL-SCNC: 24 MMOL/L (ref 22–29)
COLOR UR: YELLOW
CREAT SERPL-MCNC: 1.2 MG/DL (ref 0.5–1)
EOSINOPHIL # BLD: 0.2 K/UL (ref 0.05–0.5)
EOSINOPHILS RELATIVE PERCENT: 6 % (ref 0–6)
ERYTHROCYTE [DISTWIDTH] IN BLOOD BY AUTOMATED COUNT: 12.9 % (ref 11.5–15)
GFR, ESTIMATED: 51 ML/MIN/1.73M2
GLUCOSE SERPL-MCNC: 90 MG/DL (ref 74–99)
GLUCOSE UR STRIP-MCNC: NEGATIVE MG/DL
HCT VFR BLD AUTO: 37.4 % (ref 34–48)
HGB BLD-MCNC: 12.1 G/DL (ref 11.5–15.5)
HGB UR QL STRIP.AUTO: NEGATIVE
IMM GRANULOCYTES # BLD AUTO: <0.03 K/UL (ref 0–0.58)
IMM GRANULOCYTES NFR BLD: 0 % (ref 0–5)
KETONES UR STRIP-MCNC: NEGATIVE MG/DL
LEUKOCYTE ESTERASE UR QL STRIP: NEGATIVE
LYMPHOCYTES NFR BLD: 0.95 K/UL (ref 1.5–4)
LYMPHOCYTES RELATIVE PERCENT: 29 % (ref 20–42)
MCH RBC QN AUTO: 30 PG (ref 26–35)
MCHC RBC AUTO-ENTMCNC: 32.4 G/DL (ref 32–34.5)
MCV RBC AUTO: 92.6 FL (ref 80–99.9)
MONOCYTES NFR BLD: 0.34 K/UL (ref 0.1–0.95)
MONOCYTES NFR BLD: 10 % (ref 2–12)
NEUTROPHILS NFR BLD: 54 % (ref 43–80)
NEUTS SEG NFR BLD: 1.76 K/UL (ref 1.8–7.3)
NITRITE UR QL STRIP: NEGATIVE
PH UR STRIP: 6 [PH] (ref 5–9)
PLATELET # BLD AUTO: 226 K/UL (ref 130–450)
PMV BLD AUTO: 8.4 FL (ref 7–12)
POTASSIUM SERPL-SCNC: 4 MMOL/L (ref 3.5–5)
PROT UR STRIP-MCNC: ABNORMAL MG/DL
RBC # BLD AUTO: 4.04 M/UL (ref 3.5–5.5)
RBC #/AREA URNS HPF: ABNORMAL /HPF
SODIUM SERPL-SCNC: 137 MMOL/L (ref 132–146)
SP GR UR STRIP: 1.02 (ref 1–1.03)
UROBILINOGEN UR STRIP-ACNC: 0.2 EU/DL (ref 0–1)
WBC #/AREA URNS HPF: ABNORMAL /HPF
WBC OTHER # BLD: 3.3 K/UL (ref 4.5–11.5)

## 2025-01-26 PROCEDURE — 99232 SBSQ HOSP IP/OBS MODERATE 35: CPT | Performed by: INTERNAL MEDICINE

## 2025-01-26 PROCEDURE — 2500000003 HC RX 250 WO HCPCS: Performed by: STUDENT IN AN ORGANIZED HEALTH CARE EDUCATION/TRAINING PROGRAM

## 2025-01-26 PROCEDURE — 6370000000 HC RX 637 (ALT 250 FOR IP): Performed by: INTERNAL MEDICINE

## 2025-01-26 PROCEDURE — 99233 SBSQ HOSP IP/OBS HIGH 50: CPT | Performed by: INTERNAL MEDICINE

## 2025-01-26 PROCEDURE — 6370000000 HC RX 637 (ALT 250 FOR IP): Performed by: STUDENT IN AN ORGANIZED HEALTH CARE EDUCATION/TRAINING PROGRAM

## 2025-01-26 PROCEDURE — 1200000000 HC SEMI PRIVATE

## 2025-01-26 RX ADMIN — GABAPENTIN 300 MG: 300 CAPSULE ORAL at 09:48

## 2025-01-26 RX ADMIN — SACUBITRIL AND VALSARTAN 1 TABLET: 24; 26 TABLET, FILM COATED ORAL at 20:19

## 2025-01-26 RX ADMIN — METOPROLOL SUCCINATE 25 MG: 25 TABLET, FILM COATED, EXTENDED RELEASE ORAL at 09:51

## 2025-01-26 RX ADMIN — SODIUM CHLORIDE, PRESERVATIVE FREE 10 ML: 5 INJECTION INTRAVENOUS at 20:20

## 2025-01-26 RX ADMIN — METOPROLOL SUCCINATE 25 MG: 25 TABLET, FILM COATED, EXTENDED RELEASE ORAL at 20:20

## 2025-01-26 RX ADMIN — GABAPENTIN 300 MG: 300 CAPSULE ORAL at 20:19

## 2025-01-26 RX ADMIN — RIVASTIGMINE TARTRATE 1.5 MG: 1.5 CAPSULE ORAL at 20:19

## 2025-01-26 RX ADMIN — APIXABAN 5 MG: 5 TABLET, FILM COATED ORAL at 09:48

## 2025-01-26 RX ADMIN — OSELTAMIVIR PHOSPHATE 30 MG: 30 CAPSULE ORAL at 09:50

## 2025-01-26 RX ADMIN — Medication 1000 UNITS: at 20:20

## 2025-01-26 RX ADMIN — OSELTAMIVIR PHOSPHATE 30 MG: 30 CAPSULE ORAL at 20:19

## 2025-01-26 RX ADMIN — Medication 1 TABLET: at 09:48

## 2025-01-26 RX ADMIN — RIVASTIGMINE TARTRATE 1.5 MG: 1.5 CAPSULE ORAL at 09:50

## 2025-01-26 RX ADMIN — APIXABAN 5 MG: 5 TABLET, FILM COATED ORAL at 20:19

## 2025-01-26 ASSESSMENT — PAIN SCALES - GENERAL
PAINLEVEL_OUTOF10: 0

## 2025-01-26 NOTE — PROGRESS NOTES
INPATIENT CARDIOLOGY FOLLOW-UP    Name: Karlie Vaca    Age: 69 y.o.    Date of Admission: 1/24/2025  4:38 AM    Date of Service: 1/25/2025    Chief Complaint: Follow-up for new onset AF    Interim History:  No new overnight cardiac complaints. Currently with no complaints of CP, SOB, palpitations, dizziness, or lightheadedness. SR on telemetry.    Review of Systems:   Cardiac: As per HPI  General: No fever, chills  Pulmonary: As per HPI  HEENT: No visual disturbances, difficult swallowing  GI: No nausea, vomiting  Endocrine: No thyroid disease or DM  Musculoskeletal: MENENDEZ x 4, no focal motor deficits  Skin: Intact, no rashes  Neuro/Psych: No headache or seizures    Problem List:  Patient Active Problem List   Diagnosis    Chest pain    Ganglion cyst of finger of left hand    Glenohumeral arthritis, left    Impingement syndrome of left shoulder    Gurgling breath sounds    NICM (nonischemic cardiomyopathy) (HCC)    Chronic HFrEF (heart failure with reduced ejection fraction) (HCC)    Acute myeloid leukemia in remission (HCC)    Stage 3a chronic kidney disease (HCC)    Nonrheumatic mitral valve regurgitation    Syncope and collapse    Altered mental status    Seizure-like activity ([facility])    Atrial fibrillation with rapid ventricular response (HCC)    Influenza A    Dehydration    Atrial fibrillation (HCC)    Chronic systolic (congestive) heart failure (HCC)       Allergies:  Allergies   Allergen Reactions    Pcn [Penicillins] Shortness Of Breath and Other (See Comments)     PER PT \"SHE PASSED OUT\"    Vancomycin Itching, Swelling and Rash       Current Medications:  Current Facility-Administered Medications   Medication Dose Route Frequency Provider Last Rate Last Admin    Vitamin D (CHOLECALCIFEROL) tablet 1,000 Units  1,000 Units Oral Nightly Hric, Enrico, DO        0.9 % sodium chloride infusion   IntraVENous Continuous Hric, Enrico, DO 50 mL/hr at 01/25/25 1350 New Bag at 01/25/25 1350    sodium chloride

## 2025-01-26 NOTE — PROGRESS NOTES
[facility] Hospitalist   Progress Note    Admitting Date and Time: 1/24/2025  4:38 AM  Admit Dx: A-fib (HCC) [I48.91]  Influenza A [J10.1]  Atrial fibrillation with rapid ventricular response (HCC) [I48.91]  Fall, initial encounter [W19.XXXA]  Atrial fibrillation, unspecified type (HCC) [I48.91]  Acute bilateral low back pain without sciatica [M54.50]    Subjective:    Patient was admitted with A-fib (HCC) [I48.91]  Influenza A [J10.1]  Atrial fibrillation with rapid ventricular response (HCC) [I48.91]  Fall, initial encounter [W19.XXXA]  Atrial fibrillation, unspecified type (HCC) [I48.91]  Acute bilateral low back pain without sciatica [M54.50]. Patient denies fever, chills, cp, sob, n/v.     Vitamin D  1,000 Units Oral Nightly    sodium chloride flush  5-40 mL IntraVENous 2 times per day    [Held by provider] furosemide  20 mg Oral Weekly    rivastigmine  1.5 mg Oral BID    [Held by provider] sacubitril-valsartan  1 tablet Oral BID    [Held by provider] spironolactone  12.5 mg Oral Daily    therapeutic multivitamin-minerals  1 tablet Oral Daily    oseltamivir  30 mg Oral BID    gabapentin  300 mg Oral BID    metoprolol succinate  25 mg Oral BID    apixaban  5 mg Oral BID     sodium chloride flush, 5-40 mL, PRN  sodium chloride, , PRN  potassium chloride, 40 mEq, PRN   Or  potassium alternative oral replacement, 40 mEq, PRN   Or  potassium chloride, 10 mEq, PRN  magnesium sulfate, 2,000 mg, PRN  ondansetron, 4 mg, Q8H PRN   Or  ondansetron, 4 mg, Q6H PRN  polyethylene glycol, 17 g, Daily PRN  acetaminophen, 650 mg, Q6H PRN   Or  acetaminophen, 650 mg, Q6H PRN  benzonatate, 100 mg, TID PRN         Objective:    /73   Pulse 66   Temp 98.8 °F (37.1 °C) (Oral)   Resp 16   Ht 1.727 m (5' 8\")   Wt 78 kg (172 lb)   SpO2 100%   BMI 26.15 kg/m²   Skin: warm and dry, no rash or erythema  Pulmonary/Chest: clear to auscultation bilaterally- no wheezes, rales or rhonchi, normal air movement, no

## 2025-01-27 VITALS
RESPIRATION RATE: 18 BRPM | HEIGHT: 68 IN | WEIGHT: 172 LBS | SYSTOLIC BLOOD PRESSURE: 130 MMHG | BODY MASS INDEX: 26.07 KG/M2 | TEMPERATURE: 98.4 F | HEART RATE: 66 BPM | DIASTOLIC BLOOD PRESSURE: 76 MMHG | OXYGEN SATURATION: 97 %

## 2025-01-27 LAB
ANION GAP SERPL CALCULATED.3IONS-SCNC: 10 MMOL/L (ref 7–16)
BUN SERPL-MCNC: 16 MG/DL (ref 6–23)
CALCIUM SERPL-MCNC: 9.2 MG/DL (ref 8.6–10.2)
CHLORIDE SERPL-SCNC: 106 MMOL/L (ref 98–107)
CO2 SERPL-SCNC: 25 MMOL/L (ref 22–29)
CREAT SERPL-MCNC: 1.1 MG/DL (ref 0.5–1)
EKG ATRIAL RATE: 63 BPM
EKG P AXIS: 35 DEGREES
EKG P-R INTERVAL: 164 MS
EKG Q-T INTERVAL: 442 MS
EKG QRS DURATION: 148 MS
EKG QTC CALCULATION (BAZETT): 452 MS
EKG R AXIS: -62 DEGREES
EKG T AXIS: 84 DEGREES
EKG VENTRICULAR RATE: 63 BPM
GFR, ESTIMATED: 57 ML/MIN/1.73M2
GLUCOSE SERPL-MCNC: 94 MG/DL (ref 74–99)
POTASSIUM SERPL-SCNC: 4 MMOL/L (ref 3.5–5)
SODIUM SERPL-SCNC: 141 MMOL/L (ref 132–146)

## 2025-01-27 PROCEDURE — 99239 HOSP IP/OBS DSCHRG MGMT >30: CPT | Performed by: INTERNAL MEDICINE

## 2025-01-27 PROCEDURE — 80048 BASIC METABOLIC PNL TOTAL CA: CPT

## 2025-01-27 PROCEDURE — 93010 ELECTROCARDIOGRAM REPORT: CPT | Performed by: INTERNAL MEDICINE

## 2025-01-27 PROCEDURE — 6370000000 HC RX 637 (ALT 250 FOR IP): Performed by: INTERNAL MEDICINE

## 2025-01-27 PROCEDURE — 6370000000 HC RX 637 (ALT 250 FOR IP): Performed by: STUDENT IN AN ORGANIZED HEALTH CARE EDUCATION/TRAINING PROGRAM

## 2025-01-27 PROCEDURE — 36415 COLL VENOUS BLD VENIPUNCTURE: CPT

## 2025-01-27 RX ORDER — OSELTAMIVIR PHOSPHATE 30 MG/1
30 CAPSULE ORAL 2 TIMES DAILY
Qty: 3 CAPSULE | Refills: 0 | Status: SHIPPED | OUTPATIENT
Start: 2025-01-27 | End: 2025-01-29

## 2025-01-27 RX ORDER — METOPROLOL SUCCINATE 25 MG/1
25 TABLET, EXTENDED RELEASE ORAL 2 TIMES DAILY
Qty: 30 TABLET | Refills: 3 | Status: SHIPPED | OUTPATIENT
Start: 2025-01-27

## 2025-01-27 RX ADMIN — RIVASTIGMINE TARTRATE 1.5 MG: 1.5 CAPSULE ORAL at 08:45

## 2025-01-27 RX ADMIN — APIXABAN 5 MG: 5 TABLET, FILM COATED ORAL at 08:45

## 2025-01-27 RX ADMIN — METOPROLOL SUCCINATE 25 MG: 25 TABLET, FILM COATED, EXTENDED RELEASE ORAL at 08:45

## 2025-01-27 RX ADMIN — OSELTAMIVIR PHOSPHATE 30 MG: 30 CAPSULE ORAL at 08:45

## 2025-01-27 RX ADMIN — GABAPENTIN 300 MG: 300 CAPSULE ORAL at 08:45

## 2025-01-27 RX ADMIN — Medication 1 TABLET: at 08:45

## 2025-01-27 RX ADMIN — SACUBITRIL AND VALSARTAN 1 TABLET: 24; 26 TABLET, FILM COATED ORAL at 08:45

## 2025-01-27 NOTE — DISCHARGE SUMMARY
[facility] Hospitalist       Hospitalist Physician Discharge Summary       Naya Michele DO  [address] [address]  [phone]    Go on 2/5/2025  for hospital follow up @ 1341      Activity level: as kourtney    Diet: ADULT DIET; Regular; Low Sodium (2 gm)    Dispo:home    Condition at discharge: fair          Patient ID:  Karlie Vaca  [id number]  69 y.o.  1955    Admit date: 1/24/2025    Discharge date and time:  1/27/2025  4:15 PM    Admission Diagnoses: Principal Problem:    Atrial fibrillation with rapid ventricular response (HCC)  Active Problems:    Influenza A    Dehydration    Atrial fibrillation (HCC)    Chronic systolic (congestive) heart failure (HCC)  Resolved Problems:    * No resolved hospital problems. *      Discharge Diagnoses: Principal Problem:    Atrial fibrillation with rapid ventricular response (HCC)  Active Problems:    Influenza A    Dehydration    Atrial fibrillation (HCC)    Chronic systolic (congestive) heart failure (HCC)  Resolved Problems:    * No resolved hospital problems. *    Sepsis  Influenza A infection  Atrial fib  Dehydration  Hip/pelvic pain  Chronic systolic chf      Consults:  IP CONSULT TO CARDIOLOGY  IP CONSULT TO CARDIOLOGY  IP CONSULT TO INTERNAL MEDICINE  IP CONSULT TO VASCULAR ACCESS TEAM  IP CONSULT TO HEART FAILURE NURSE/COORDINATOR    Procedures: echo    Left Ventricle: Moderately reduced left ventricular systolic function with a visually estimated EF of 35 - 40%. Left ventricle size is normal. Moderately increased ventricular mass. Findings consistent with moderate concentric hypertrophy. Normal wall motion. Grade I diastolic dysfunction with normal LAP.    Right Ventricle: Right ventricle size is normal. Normal systolic function. TAPSE is 2.4 cm.    Aortic Valve: No significant stenosis.    Tricuspid Valve: Normal RVSP. The estimated RVSP is 23 mmHg.    Interatrial Septum: Agitated saline study was positive without

## 2025-01-27 NOTE — CONSULTS
Quintin Isaac [facility]   Inpatient CHF Nurse Navigator Consult      Cardiologist: Dr Perdomo ([place])     Karlie Vaca is a 69 y.o. (1955) female with a history of HFrEF, most recent EF:  Lab Results   Component Value Date    LVEF 35 01/06/2022       Patient was awake and alert, laying in bed during the consultation and is agreeable to heart failure education. She was engaged and asked appropriate questions throughout the education session. She lives at home with her . They would like to get established with someone local as well as her doctor in [place]. She has a working scale at home.    Barriers identified during consult contributing to HF Hospitalization:  [] Limited medication adherence   [] Poor health literacy, education regarding HF medications provided   [] Pill box provided to patient  [] Difficulty affording medications  [] Difficulty obtaining/ managing medications  [] Prescription assistance information given     [x] Not weighing themselves daily  [x] Weight log provided for easy monitoring  [] Scale provided     [x] Not following low sodium diet  [] Food insecurity   [x] 2 gram sodium diet education provided   [] Low sodium recipes provided  [] Sodium free seasoning provided   [] Low sodium meal delivery options given to patient  [] Dietician consulted     [] Lack of transportation to appointments     [] Depression, given chronic illness  [] Primary team notified     [] Goals of care need addressed  [] Palliative care consulted     [] CHF CHW consulted, to assist with       Chart Reviewed:  Diet: ADULT DIET; Regular; Low Sodium (2 gm)   Daily Weights: Patient Vitals for the past 96 hrs (Last 3 readings):   Weight   01/25/25 0705 78 kg (172 lb)   01/25/25 0434 78.1 kg (172 lb 2.9 oz)   01/24/25 0434 80.7 kg (178 lb)     I/O: No intake or output data in the 24 hours ending 01/27/25 1238    [] Nursing staff/manager notified of inaccurate cotter weights or 
ADL's does not use any assistive devices.  He no longer drives a car due to memory loss.  Denies any other recent falls. Reports compliance with all prescribed medication.  Her family is very involved and concerned if she were to start a blood thinner due to her memory loss recent fall, and risk of falling.      Please note: past medical records were reviewed per electronic medical record (EMR) - see detailed reports under Past Medical/ Surgical History.   Past Medical History:    AML (acute myeloid leukemia) in remission (HCC) 2017 s/p idarubicin and stemcell transplant 11/30/17 at    Breast cancer in female (HCC) 2004 right, s/p chemotherapy and radiation therapy and lumpectomy with lymph nodes   CAD - mild luminal irregularities LAD (left heart cath 11/18/2020)  CHF-nonischemic cardiomyopathy, secondary to anthracycline toxicity  On GDMT with spironolactone 12.5 mg daily, Entresto 49/51 mg 1 tablet twice daily, and Lasix 20 mg daily as needed  LVEF 38%  Left ventricular thrombus (1/19/2021)-treated with Coumadin with resolution of LV thrombus, no longer on Coumadin therapy  CKD  stage 3, GFR 30-59 ml/min (hypertensive kidney disease) CR baseline 1.3  HTN  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   Non-smoker    Transesophageal echocardiogram 11/17/2020 [facility]:  CONCLUSIONS:   - Exam indication: AR / MR mechanism study   - The left ventricle is dilated. Left ventricular systolic function is severely   decreased. EF = 25 ± 5% (visual est.)   - The right ventricle is normal in size. Right ventricular systolic function is   mildly decreased.   - The left atrial cavity is dilated. Prominent PFO tunnel with 
systolic function is   moderately decreased. EF = 38 ± 5% (2D 4-ch.) Grade I left ventricular diastolic dysfunction.  - The right ventricle is normal in size. Right ventricular systolic function is normal.  - The left atrial cavity is mildly dilated. Probable left to right shunting across the interatrial septum better demonstrated on prior DAVID.  - Exam was compared with the prior  echocardiographic exam performed on 7/28/2021. Some improvement in LVEF noted     See accompanying documentation for full consult.    ASSESSMENT AND PLAN:  Patient Active Problem List   Diagnosis    Chest pain    Ganglion cyst of finger of left hand    Glenohumeral arthritis, left    Impingement syndrome of left shoulder    Gurgling breath sounds    NICM (nonischemic cardiomyopathy) ([facility])    Chronic HFrEF (heart failure with reduced ejection fraction) ([facility])    Acute myeloid leukemia in remission ([facility])    Stage 3a chronic kidney disease ([facility])    Nonrheumatic mitral valve regurgitation    Syncope and collapse    Altered mental status    Seizure-like activity ([facility])    A-fib ([facility])     1. New onset Afib:     Chart/labs/EKG/monitor reviewed.     Likely incited by influenza.    CHADS VASc score of at least 4 indicating need for anticoagulation. Start eliquis 5 mg BID.     Rate for now with BB and PRN IV dig.     If she does not revert to sinus spontaneously, then arrange DAVID-DCCV as outpatient here or with her F cardiologist after resolution of her acute issues.     2. NICMP/Chronic systolic CHF: Suggested to be secondary to prior anthracycline toxicity.    Echo [facility] 12/22/2023 with LVEF 38%, normal RV function, PFO noted.     Echo ordered.     Typically on entresto/BB/spirolactone/lasix. Entresto/spironolactone/lasix held due to acute on CKD.     3. CAD: Mild luminal irregularities LAD (left heart cath 11/18/2020).    Medically manage.     4. VHD:    Prior significant functional MR.     Echo ordered.     5. PFO with left-to-right shunt:

## 2025-01-27 NOTE — DISCHARGE INSTRUCTIONS
weight gain of 3 pounds or more in 1 day         OR 5 pounds or more in one week  More shortness of breath  More swelling of your stomach, legs, ankles or feet  Feeling more tired, No energy  Dry hacky cough  Dizziness  More chest pain / discomfort       (CALL [phone] IF ANY OF THE FOLLOWING OCCURS  Chest pain (not relieved with nitroglycerine, if you have been prescribed this medication)  Severe shortness of breath  Faint / Pass out  Confusion / cannot think clearly  If symptoms get worse           SMOKING - TOBACCO USE:  * IF YOU SMOKE - STOP!  Kick the habit.  [facility] Program is offered at [facility] and [facility]. Call [phone] extension [phone] for more information.             ACTIVITY:   (Ask your doctor when you will be able to return to work and before starting any exercise program.  Do not drive unless unless your doctor has given you permission to do so).  Start light exercise.  Even if you can only do a small amount, exercise will help you get stronger, have more energy, help manage your weight and decrease  stress. Walking is an easy way to get exercise. Start out slowly and  increase the amount you walk as tolerated  If you become short of breath, dizzy or have chest pain; stop, sit down, and rest.  If you feel \"wiped out\" the day after you exercise, walk at a slower pace or for a shorter distance. You can gradually increase the pace or amount of time.            (Do not exercise right after a meal or in extreme temperatures, such as above 85 degrees, if the air is really humid, or wind chill is less than 20 degrees)                                             ADDITIONAL INFORMATION:  Avoid getting sick from colds and the flu. Stay away from friends or family that you know may have a contagious illness  Get plenty of rest   Get a flu shot each year.  Get a pneumococcal vaccine shot. If you have had one before, ask your

## 2025-01-27 NOTE — PLAN OF CARE
Patient's chart updated to reflect:      .    - HF care plan, HF education points and HF discharge instructions.  -Orders: 2 gram sodium diet, daily weights, I/O.  -PCP or cardiology follow up appointments to be scheduled within 7 days of hospital discharge.  -CHF education session will be provided to the patient prior to hospital discharge.    Marisol Perez RN   Heart Failure Navigator

## 2025-01-27 NOTE — CARE COORDINATION
1/27/2025  Social Work Discharge Planning: Atrial fib. Cardiology is following.This worker discussed  role and transition of care/discharge planning.Pt is independent from home with her spouse and uses no DME. Room air. Possible discharge today. Spouse will transport Pt at discharge. Electronically signed by EDNA Renee on 1/27/2025 at 11:24 AM

## 2025-01-27 NOTE — ACP (ADVANCE CARE PLANNING)
1/27/2025  Advance Care Planning   Healthcare Decision Maker:    Primary Decision Maker: Darwin Vaca - Spouse - [phone]    Secondary Decision Maker: Jennifer Marsh - Ivette - [phone]    Click here to complete Healthcare Decision Makers including selection of the Healthcare Decision Maker Relationship (ie \"Primary\").

## 2025-01-27 NOTE — PROGRESS NOTES
INPATIENT CARDIOLOGY FOLLOW-UP    Name: Kalrie Vaca    Age: 69 y.o.    Date of Admission: 1/24/2025  4:38 AM    Date of Service: 1/26/2025    Chief Complaint: Follow-up for new onset AF    Interim History:  No new overnight cardiac complaints. Currently with no complaints of CP, SOB, palpitations, dizziness, or lightheadedness. SR on telemetry.    Review of Systems:   Cardiac: As per HPI  General: No fever, chills  Pulmonary: As per HPI  HEENT: No visual disturbances, difficult swallowing  GI: No nausea, vomiting  Endocrine: No thyroid disease or DM  Musculoskeletal: MENENDEZ x 4, no focal motor deficits  Skin: Intact, no rashes  Neuro/Psych: No headache or seizures    Problem List:  Patient Active Problem List   Diagnosis    Chest pain    Ganglion cyst of finger of left hand    Glenohumeral arthritis, left    Impingement syndrome of left shoulder    Gurgling breath sounds    NICM (nonischemic cardiomyopathy) (HCC)    Chronic HFrEF (heart failure with reduced ejection fraction) (HCC)    Acute myeloid leukemia in remission (HCC)    Stage 3a chronic kidney disease (HCC)    Nonrheumatic mitral valve regurgitation    Syncope and collapse    Altered mental status    Seizure-like activity (HCC)    Atrial fibrillation with rapid ventricular response (HCC)    Influenza A    Dehydration    Atrial fibrillation (HCC)    Chronic systolic (congestive) heart failure (HCC)       Allergies:  Allergies   Allergen Reactions    Pcn [Penicillins] Shortness Of Breath and Other (See Comments)     PER PT \"SHE PASSED OUT\"    Vancomycin Itching, Swelling and Rash       Current Medications:  Current Facility-Administered Medications   Medication Dose Route Frequency Provider Last Rate Last Admin    Vitamin D (CHOLECALCIFEROL) tablet 1,000 Units  1,000 Units Oral Nightly Hric, Enrico, DO   1,000 Units at 01/25/25 2124    0.9 % sodium chloride infusion   IntraVENous Continuous Hric, Enrico, DO 50 mL/hr at 01/25/25 1350 New Bag at 01/25/25

## 2025-02-24 PROBLEM — E86.0 DEHYDRATION: Status: RESOLVED | Noted: 2025-01-25 | Resolved: 2025-02-24

## 2025-02-24 PROBLEM — J10.1 INFLUENZA A: Status: RESOLVED | Noted: 2025-01-25 | Resolved: 2025-02-24

## 2025-02-28 ENCOUNTER — OFFICE VISIT (OUTPATIENT)
Age: 70
End: 2025-02-28
Payer: MEDICARE

## 2025-02-28 VITALS
HEART RATE: 53 BPM | SYSTOLIC BLOOD PRESSURE: 139 MMHG | DIASTOLIC BLOOD PRESSURE: 68 MMHG | BODY MASS INDEX: 26.97 KG/M2 | WEIGHT: 177.4 LBS | OXYGEN SATURATION: 96 %

## 2025-02-28 DIAGNOSIS — I42.8 NONISCHEMIC CARDIOMYOPATHY (HCC): ICD-10-CM

## 2025-02-28 DIAGNOSIS — I48.0 PAF (PAROXYSMAL ATRIAL FIBRILLATION) (HCC): ICD-10-CM

## 2025-02-28 DIAGNOSIS — I50.20 HFREF (HEART FAILURE WITH REDUCED EJECTION FRACTION) (HCC): Primary | ICD-10-CM

## 2025-02-28 PROCEDURE — 1159F MED LIST DOCD IN RCRD: CPT | Performed by: NURSE PRACTITIONER

## 2025-02-28 PROCEDURE — 99213 OFFICE O/P EST LOW 20 MIN: CPT | Performed by: NURSE PRACTITIONER

## 2025-02-28 PROCEDURE — 1123F ACP DISCUSS/DSCN MKR DOCD: CPT | Performed by: NURSE PRACTITIONER

## 2025-02-28 RX ORDER — SPIRONOLACTONE 25 MG/1
25 TABLET ORAL DAILY
COMMUNITY

## 2025-02-28 NOTE — PROGRESS NOTES
Congestive Heart Failure Clinic   [facility]       Reason for Visit: Heart Failure     Primary Cardiologist: Dr. Vargas ([facility])    History of Present Illness:     Ms. Vaca is a 69 year old female with a PMHx of chronic HFrEF, nonischemic cardiomyopathy, Hx of LV thrombus, moderate MR, PAF, PAF CKD, HTN, AML, history of breast cancer, and cognitive dysfunction.    She was hospitalized from 1/24/2025 - 1/27/2025 for atrial fibrillation with RVR in the setting of influenza.  She was admitted to the hospital given IV fluids and tamiflu.  She was rate controlled with metoprolol and anticoagulated for elevated stroke risk with Eliquis.  GDMT was adjusted as tolerated and she was discharged to home with subjective improvement.    She presents today in hospital follow-up accompanied by her .  Since discharge from the hospital she has been compliant with her current cardiac medications.  She denies any current cardiac complaints.  She denies any dizziness, lightness, near-syncope, syncope or strokelike symptoms.  She denies any chest pain, pressure, heaviness or palpitations.  She denies any shortness of breath, orthopnea, PND, abdominal bloating, early satiety or lower extremity edema.     Patient Active Problem List    Diagnosis Date Noted    Atrial fibrillation ([facility]) 01/25/2025    Chronic systolic (congestive) heart failure ([facility]) 01/25/2025    Atrial fibrillation with rapid ventricular response ([facility]) 01/24/2025    Altered mental status 03/05/2024    Seizure-like activity ([facility]) 03/05/2024    Syncope and collapse 03/03/2024    Chronic HFrEF (heart failure with reduced ejection fraction) ([facility]) 05/24/2023    Acute myeloid leukemia in remission ([facility]) 05/24/2023    Stage 3a chronic kidney disease ([facility]) 05/24/2023    Nonrheumatic mitral valve regurgitation 05/24/2023    NICM (nonischemic cardiomyopathy) ([facility])     Gurgling breath sounds 05/19/2020    Glenohumeral arthritis, left

## 2025-02-28 NOTE — PATIENT INSTRUCTIONS
Will obtain recent labs from Dr. Michele to review  Continue current cardiac medications  Consider starting SGLT2i (will review with Dr. Vargas in follow up appointment)  Discussed sleep study referral, deferring at this time   Follow up with Dr. Vargas as scheduled   Follow up with CHF clinic as needed

## 2025-04-09 ENCOUNTER — HOSPITAL ENCOUNTER (EMERGENCY)
Age: 70
Discharge: HOME | End: 2025-04-09
Payer: MEDICARE

## 2025-04-09 VITALS
DIASTOLIC BLOOD PRESSURE: 96 MMHG | BODY MASS INDEX: 27.32 KG/M2 | TEMPERATURE: 97.5 F | SYSTOLIC BLOOD PRESSURE: 115 MMHG | OXYGEN SATURATION: 100 % | RESPIRATION RATE: 16 BRPM | HEART RATE: 58 BPM | WEIGHT: 170 LBS | HEIGHT: 66 IN

## 2025-04-09 DIAGNOSIS — T65.91XA ACCIDENTAL INGESTION OF SUBSTANCE, INITIAL ENCOUNTER: Primary | ICD-10-CM

## 2025-04-09 PROCEDURE — 99282 EMERGENCY DEPT VISIT SF MDM: CPT

## 2025-04-10 NOTE — ED PROVIDER NOTES
Independent GUILLERMINA Visit      [facility]  Department of Emergency Medicine   ED  Encounter Note  Admit Date/RoomTime: 2025  8:16 PM  ED Room: 36/36    NAME: Karlie Vaca  : 1955  MRN: [id number]     Chief Complaint:  Medication Reaction (Has colonoscopy tomorrow, was supposed to take 4 dulcolax suppository, took it orally, PCP sent patient in for eval. )    History of Present Illness   History provided by the patient, patient's , and chart review.     Karlie Vaca is a 69 y.o. old female with a history of hypertension, nonischemic cardiomyopathy, CHF, CKD, acute leukemia, right breast cancer, and dementia who presents to the emergency department by private vehicle, for known unintentional ingestion of one dulcolax suppository which occurred 1 hour(s) prior to arrival.  The incident took place at home.  She was preparing for a colonoscopy.  She was to take 4 Dulcolax tablets.  Her  did not realize that he had bought suppositories and gave her 1 suppository, which she took orally.  I did clarify with him that only 1 suppository was given/ingested and not the 4 that were reported in triage. There has been no nausea, vomiting, diarrhea, abdominal pain, dizziness/lightheadedness, or chest pain since incident occurred.  She denies suicide intent.     Associated Signs and Symptoms:  none.  Severity of Symptoms:   None.  Amount Ingested:  known to be 10 mg.  Duration (of Ingestion):  once.  Stressors include: none.    ROS   Pertinent positives and negatives are stated within HPI, all other systems reviewed and are negative.    Past Medical History:  has a past medical history of Acute leukemia (HCC), Breast cancer (HCC), Cancer (HCC), History of blood transfusion, and Hypertension.    Surgical History:  has a past surgical history that includes Tubal ligation; Breast lumpectomy; Ankle surgery (Left); other surgical history (Left, 2016); Breast lumpectomy (Right); Dilation

## 2025-04-28 ENCOUNTER — HOSPITAL ENCOUNTER (OUTPATIENT)
Age: 70
Discharge: HOME | End: 2025-04-30

## 2025-04-28 PROCEDURE — 88305 TISSUE EXAM BY PATHOLOGIST: CPT

## 2025-05-02 LAB — SURGICAL PATHOLOGY REPORT: NORMAL

## (undated) DEVICE — CAMERA STRYKER 1488 HD GEN

## (undated) DEVICE — CONTROL SYRINGE LUER-LOCK TIP: Brand: MONOJECT

## (undated) DEVICE — KIT,ANTI FOG,W/SPONGE & FLUID,SOFT PACK: Brand: MEDLINE

## (undated) DEVICE — SURGICAL PROCEDURE PACK BASIC

## (undated) DEVICE — LAPAROSCOPIC SCISSORS: Brand: EPIX LAPAROSCOPIC SCISSORS

## (undated) DEVICE — NEEDLE INSUF L120MM DIA2MM DISP FOR PNEUMOPERI ENDOPATH

## (undated) DEVICE — TROCAR ENDOSCP L100MM DIA5MM BLDELSS STBL SL OBT RADLUC

## (undated) DEVICE — GOWN,SIRUS,FABRNF,XL,20/CS: Brand: MEDLINE

## (undated) DEVICE — APPLIER CLP M L L11.4IN DIA10MM ENDOSCP ROT MULT FOR LIG

## (undated) DEVICE — GLOVE SURG SZ 75 STD WHT LTX SYN POLYMER BEAD REINF ANTI RL

## (undated) DEVICE — GARMENT COMPR STD FOR 17IN CALF UNIF THER FLOTRN

## (undated) DEVICE — TIBURON GENERAL ENDOSCOPY DRAPE: Brand: CONVERTORS

## (undated) DEVICE — INTENDED FOR TISSUE SEPARATION, AND OTHER PROCEDURES THAT REQUIRE A SHARP SURGICAL BLADE TO PUNCTURE OR CUT.: Brand: BARD-PARKER ® STAINLESS STEEL BLADES

## (undated) DEVICE — SYRINGE MED 20ML STD CLR PLAS LUERLOCK TIP N CTRL DISP

## (undated) DEVICE — SET ENDO INSTR LAPAROSCOPIC STGENLAP

## (undated) DEVICE — DRAPE C ARM UNIV W41XL74IN CLR PLAS XR VELC CLSR POLY STRP

## (undated) DEVICE — PUMP SUC IRR TBNG L10FT W/ HNDPC ASSEMB STRYKEFLOW 2

## (undated) DEVICE — MEDIA CONTRAST RX ISOVUE-300 61% 30ML VIALS

## (undated) DEVICE — PATIENT RETURN ELECTRODE, SINGLE-USE, CONTACT QUALITY MONITORING, ADULT, WITH 9FT CORD, FOR PATIENTS WEIGING OVER 33LBS. (15KG): Brand: MEGADYNE

## (undated) DEVICE — TROCAR ENDOSCP SHFT L100MM DIA5MM DIL TIP ENDOPATH XCEL

## (undated) DEVICE — SET INSTRUMENT LAP I

## (undated) DEVICE — 1.5L THIN WALL CAN: Brand: CRD

## (undated) DEVICE — TROCAR ENDOSCP L100MM DIA12MM BLDELSS OBT RADLUC STBL SL

## (undated) DEVICE — STANDARD HYPODERMIC NEEDLE,ALUMINUM HUB: Brand: MONOJECT

## (undated) DEVICE — CATH CHOLANGIO 19GA PRE-VIEW KUMAR

## (undated) DEVICE — SYRINGE MED 10ML LUERLOCK TIP W/O SFTY DISP

## (undated) DEVICE — INSUFFLATION TUBING SET WITH FILTER, FUNNEL CONNECTOR AND LUER LOCK: Brand: JOSNOE MEDICAL INC

## (undated) DEVICE — CHLORAPREP 26ML ORANGE